# Patient Record
Sex: FEMALE | Race: WHITE | NOT HISPANIC OR LATINO | Employment: FULL TIME | URBAN - METROPOLITAN AREA
[De-identification: names, ages, dates, MRNs, and addresses within clinical notes are randomized per-mention and may not be internally consistent; named-entity substitution may affect disease eponyms.]

---

## 2017-02-10 RX ORDER — CITALOPRAM 20 MG/1
TABLET, FILM COATED ORAL
Qty: 30 TABLET | Refills: 10 | Status: SHIPPED | OUTPATIENT
Start: 2017-02-10 | End: 2018-03-06 | Stop reason: SDUPTHER

## 2018-02-14 ENCOUNTER — TELEPHONE (OUTPATIENT)
Dept: SMOKING CESSATION | Facility: CLINIC | Age: 38
End: 2018-02-14

## 2018-02-15 ENCOUNTER — TELEPHONE (OUTPATIENT)
Dept: SMOKING CESSATION | Facility: CLINIC | Age: 38
End: 2018-02-15

## 2018-02-16 ENCOUNTER — TELEPHONE (OUTPATIENT)
Dept: SMOKING CESSATION | Facility: CLINIC | Age: 38
End: 2018-02-16

## 2018-03-08 RX ORDER — CITALOPRAM 20 MG/1
TABLET, FILM COATED ORAL
Qty: 30 TABLET | Refills: 9 | Status: SHIPPED | OUTPATIENT
Start: 2018-03-08 | End: 2019-01-11 | Stop reason: SDUPTHER

## 2018-06-08 ENCOUNTER — OFFICE VISIT (OUTPATIENT)
Dept: OBSTETRICS AND GYNECOLOGY | Facility: CLINIC | Age: 38
End: 2018-06-08
Payer: COMMERCIAL

## 2018-06-08 ENCOUNTER — PROCEDURE VISIT (OUTPATIENT)
Dept: OBSTETRICS AND GYNECOLOGY | Facility: CLINIC | Age: 38
End: 2018-06-08
Payer: COMMERCIAL

## 2018-06-08 VITALS
HEIGHT: 65 IN | DIASTOLIC BLOOD PRESSURE: 84 MMHG | SYSTOLIC BLOOD PRESSURE: 140 MMHG | BODY MASS INDEX: 33.37 KG/M2 | WEIGHT: 200.31 LBS

## 2018-06-08 DIAGNOSIS — Z01.419 ENCOUNTER FOR GYNECOLOGICAL EXAMINATION: Primary | ICD-10-CM

## 2018-06-08 DIAGNOSIS — N91.2 ABSENT MENSES: ICD-10-CM

## 2018-06-08 DIAGNOSIS — Z11.51 SCREENING FOR HUMAN PAPILLOMAVIRUS: ICD-10-CM

## 2018-06-08 DIAGNOSIS — Z36.89 ENCOUNTER TO ESTABLISH GESTATIONAL AGE USING ULTRASOUND: ICD-10-CM

## 2018-06-08 DIAGNOSIS — Z12.4 ROUTINE CERVICAL SMEAR: ICD-10-CM

## 2018-06-08 DIAGNOSIS — Z34.92 SECOND TRIMESTER PREGNANCY: ICD-10-CM

## 2018-06-08 DIAGNOSIS — Z11.3 SCREENING EXAMINATION FOR VENEREAL DISEASE: ICD-10-CM

## 2018-06-08 PROCEDURE — 99999 PR PBB SHADOW E&M-EST. PATIENT-LVL III: CPT | Mod: PBBFAC,,, | Performed by: OBSTETRICS & GYNECOLOGY

## 2018-06-08 PROCEDURE — 87510 GARDNER VAG DNA DIR PROBE: CPT

## 2018-06-08 PROCEDURE — 87480 CANDIDA DNA DIR PROBE: CPT

## 2018-06-08 PROCEDURE — 99395 PREV VISIT EST AGE 18-39: CPT | Mod: S$GLB,,, | Performed by: OBSTETRICS & GYNECOLOGY

## 2018-06-08 PROCEDURE — 87491 CHLMYD TRACH DNA AMP PROBE: CPT

## 2018-06-08 PROCEDURE — 88175 CYTOPATH C/V AUTO FLUID REDO: CPT

## 2018-06-08 PROCEDURE — 87624 HPV HI-RISK TYP POOLED RSLT: CPT

## 2018-06-08 PROCEDURE — 76805 OB US >/= 14 WKS SNGL FETUS: CPT | Mod: S$GLB,,, | Performed by: OBSTETRICS & GYNECOLOGY

## 2018-06-08 NOTE — PROGRESS NOTES
"CC: Well woman exam    Missy Fernandez is a 37 y.o. female  presents for a well woman exam.  She is established but I haven't seen her since 2016.  Last pap was ASCUS neg HPV In 2016 and never followed up for repeat pap.  H/o irregular cycles.  Unsure LMP.  No c/o.  [place].     Past Medical History:   Diagnosis Date    Anxiety     Depression        Past Surgical History:   Procedure Laterality Date    abdominal laparoscopy  age 15 and 25       OB History    Para Term  AB Living   2 2 2     2   SAB TAB Ectopic Multiple Live Births           2      # Outcome Date GA Lbr Henry/2nd Weight Sex Delivery Anes PTL Lv   2 Term  40w0d  3.629 kg (8 lb) M Vag-Spont   AMILCAR      Birth Comments: pp preEclampsia admit for Magnesium sulfate   1 Term  40w0d  3.09 kg (6 lb 13 oz) F Vag-Spont   AMILCAR          Family History   Problem Relation Age of Onset    No Known Problems Mother     Heart disease Father     Hypertension Father     No Known Problems Sister     Cancer Maternal Grandmother     Kidney disease Maternal Grandfather     Depression Sister     Breast cancer Paternal Grandmother     Colon cancer Neg Hx     Ovarian cancer Neg Hx        Social History   Substance Use Topics    Smoking status: Current Every Day Smoker     Packs/day: 0.50     Types: Cigarettes    Smokeless tobacco: Never Used    Alcohol use Yes      Comment: rare       BP (!) 140/84   Ht 5' 5" (1.651 m)   Wt 90.9 kg (200 lb 4.6 oz)   BMI 33.33 kg/m²     ROS:  GENERAL: Denies weight gain or weight loss. Feeling well overall.   SKIN: Denies rash or lesions.   HEAD: Denies head injury or headache.   NODES: Denies enlarged lymph nodes.   CHEST: Denies chest pain or shortness of breath.   CARDIOVASCULAR: Denies palpitations or left sided chest pain.   ABDOMEN: No abdominal pain, constipation, diarrhea, nausea, vomiting or rectal bleeding.   URINARY: No frequency, dysuria, hematuria, or burning on urination.  REPRODUCTIVE: " See HPI.   BREASTS: The patient performs breast self-examination and denies pain, lumps, or nipple discharge.   HEMATOLOGIC: No easy bruisability or excessive bleeding.  MUSCULOSKELETAL: Denies joint pain or swelling.   NEUROLOGIC: Denies syncope or weakness.   PSYCHIATRIC: Denies depression, anxiety or mood swings.    Physical Exam:    APPEARANCE: Well nourished, well developed, in no acute distress.  AFFECT: WNL, alert and oriented x 3  SKIN: No acne or hirsutism  NECK: Neck symmetric without masses or thyromegaly  NODES: No inguinal, cervical, axillary, or femoral lymph node enlargement  CHEST: Good respiratory effect  ABDOMEN: Soft.  No tenderness or masses.  No hepatosplenomegaly.  No hernias.  BREASTS: Symmetrical, no skin changes or visible lesions.  No palpable masses, nipple discharge bilaterally.  PELVIC: Normal external genitalia without lesions.  Normal hair distribution.  Adequate perineal body, normal urethral meatus.  Vagina moist and well rugated without lesions or discharge.  Cervix pink, without lesions, discharge or tenderness.  No significant cystocele or rectocele.  Bimanual exam shows uterus to be 15 wk size, regular, mobile and nontender.  Adnexa without masses or tenderness.    EXTREMITIES: No edema.    ASSESSMENT AND PLAN  1. Encounter for gynecological examination     2. Absent menses  US OB/GYN Procedure ([facility]) - Extended List - Future   3. Routine cervical smear  Liquid-based pap smear, screening   4. Screening for human papillomavirus  HPV High Risk Genotypes, PCR   5. Screening examination for venereal disease  Vaginosis Screen by DNA Probe    C. trachomatis/N. gonorrhoeae by AMP DNA Cervicovaginal   6. Second trimester pregnancy  US MFM Procedure ([facility])-Future    HIV-1 and HIV-2 antibodies    RPR    Hepatitis B surface antigen    Type & Screen    Rubella antibody, IgG    CBC auto differential    POCT urine pregnancy    Glucose, random    TSH       Patient was counseled today  on A.C.S. Pap guidelines and recommendations for yearly pelvic exams, mammograms and monthly self breast exams; to see her PCP for other health maintenance.     +UPT and felt 15 weeks on exam.  U/s today confirms 16 wk IUP and EDC from today's scan.  Ob counseling.  Stop smoking ok to stay on celexa as she feel she cannot wean.  Wants materna 21.      Follow-up in about 4 weeks (around 7/6/2018).

## 2018-06-08 NOTE — PROCEDURES
Procedures     Obstetrical ultrasound completed today.  See report in imaging section of [facility].

## 2018-06-09 LAB
CANDIDA RRNA VAG QL PROBE: NEGATIVE
G VAGINALIS RRNA GENITAL QL PROBE: NEGATIVE
T VAGINALIS RRNA GENITAL QL PROBE: NEGATIVE

## 2018-06-10 LAB
C TRACH DNA SPEC QL NAA+PROBE: NOT DETECTED
N GONORRHOEA DNA SPEC QL NAA+PROBE: NOT DETECTED

## 2018-06-13 LAB
HPV HR 12 DNA CVX QL NAA+PROBE: NEGATIVE
HPV16 AG SPEC QL: NEGATIVE
HPV18 DNA SPEC QL NAA+PROBE: NEGATIVE

## 2018-06-15 ENCOUNTER — LAB VISIT (OUTPATIENT)
Dept: LAB | Facility: HOSPITAL | Age: 38
End: 2018-06-15
Attending: OBSTETRICS & GYNECOLOGY
Payer: COMMERCIAL

## 2018-06-15 DIAGNOSIS — Z34.92 SECOND TRIMESTER PREGNANCY: ICD-10-CM

## 2018-06-15 LAB
ABO + RH BLD: NORMAL
BASOPHILS # BLD AUTO: 0.01 K/UL
BASOPHILS NFR BLD: 0.1 %
BLD GP AB SCN CELLS X3 SERPL QL: NORMAL
DIFFERENTIAL METHOD: ABNORMAL
EOSINOPHIL # BLD AUTO: 0.1 K/UL
EOSINOPHIL NFR BLD: 0.9 %
ERYTHROCYTE [DISTWIDTH] IN BLOOD BY AUTOMATED COUNT: 13.5 %
GLUCOSE SERPL-MCNC: 91 MG/DL
HCT VFR BLD AUTO: 36.7 %
HGB BLD-MCNC: 12.4 G/DL
IMM GRANULOCYTES # BLD AUTO: 0.08 K/UL
IMM GRANULOCYTES NFR BLD AUTO: 0.9 %
LYMPHOCYTES # BLD AUTO: 2.2 K/UL
LYMPHOCYTES NFR BLD: 25.3 %
MCH RBC QN AUTO: 31.2 PG
MCHC RBC AUTO-ENTMCNC: 33.8 G/DL
MCV RBC AUTO: 92 FL
MONOCYTES # BLD AUTO: 0.7 K/UL
MONOCYTES NFR BLD: 7.7 %
NEUTROPHILS # BLD AUTO: 5.6 K/UL
NEUTROPHILS NFR BLD: 65.1 %
NRBC BLD-RTO: 0 /100 WBC
PLATELET # BLD AUTO: 153 K/UL
PMV BLD AUTO: 13.7 FL
RBC # BLD AUTO: 3.98 M/UL
RH BLD: NORMAL
TSH SERPL DL<=0.005 MIU/L-ACNC: 1.2 UIU/ML
WBC # BLD AUTO: 8.58 K/UL

## 2018-06-15 PROCEDURE — 86592 SYPHILIS TEST NON-TREP QUAL: CPT

## 2018-06-15 PROCEDURE — 86703 HIV-1/HIV-2 1 RESULT ANTBDY: CPT

## 2018-06-15 PROCEDURE — 84443 ASSAY THYROID STIM HORMONE: CPT

## 2018-06-15 PROCEDURE — 86762 RUBELLA ANTIBODY: CPT

## 2018-06-15 PROCEDURE — 86901 BLOOD TYPING SEROLOGIC RH(D): CPT

## 2018-06-15 PROCEDURE — 85025 COMPLETE CBC W/AUTO DIFF WBC: CPT

## 2018-06-15 PROCEDURE — 86850 RBC ANTIBODY SCREEN: CPT

## 2018-06-15 PROCEDURE — 87340 HEPATITIS B SURFACE AG IA: CPT

## 2018-06-15 PROCEDURE — 82947 ASSAY GLUCOSE BLOOD QUANT: CPT

## 2018-06-18 LAB
HBV SURFACE AG SERPL QL IA: NEGATIVE
HIV 1+2 AB+HIV1 P24 AG SERPL QL IA: NEGATIVE
RPR SER QL: NORMAL
RUBV IGG SER-ACNC: 34.9 IU/ML
RUBV IGG SER-IMP: REACTIVE

## 2018-06-21 PROBLEM — O26.899 RH NEGATIVE, ANTEPARTUM: Status: ACTIVE | Noted: 2018-06-21

## 2018-06-21 PROBLEM — Z67.91 RH NEGATIVE, ANTEPARTUM: Status: ACTIVE | Noted: 2018-06-21

## 2018-07-06 ENCOUNTER — INITIAL PRENATAL (OUTPATIENT)
Dept: OBSTETRICS AND GYNECOLOGY | Facility: CLINIC | Age: 38
End: 2018-07-06
Payer: COMMERCIAL

## 2018-07-06 ENCOUNTER — OFFICE VISIT (OUTPATIENT)
Dept: MATERNAL FETAL MEDICINE | Facility: CLINIC | Age: 38
End: 2018-07-06
Attending: OBSTETRICS & GYNECOLOGY
Payer: COMMERCIAL

## 2018-07-06 VITALS
WEIGHT: 200.19 LBS | BODY MASS INDEX: 33.31 KG/M2 | DIASTOLIC BLOOD PRESSURE: 70 MMHG | SYSTOLIC BLOOD PRESSURE: 124 MMHG

## 2018-07-06 DIAGNOSIS — Z34.80 SUPERVISION OF OTHER NORMAL PREGNANCY, ANTEPARTUM: Primary | ICD-10-CM

## 2018-07-06 DIAGNOSIS — Z36.89 ENCOUNTER FOR FETAL ANATOMIC SURVEY: ICD-10-CM

## 2018-07-06 DIAGNOSIS — Z34.92 SECOND TRIMESTER PREGNANCY: ICD-10-CM

## 2018-07-06 DIAGNOSIS — Z30.2 REQUEST FOR STERILIZATION: ICD-10-CM

## 2018-07-06 DIAGNOSIS — N80.9 ENDOMETRIOSIS: ICD-10-CM

## 2018-07-06 PROCEDURE — 0502F SUBSEQUENT PRENATAL CARE: CPT | Mod: S$GLB,,, | Performed by: OBSTETRICS & GYNECOLOGY

## 2018-07-06 PROCEDURE — 76811 OB US DETAILED SNGL FETUS: CPT | Mod: S$GLB,,, | Performed by: OBSTETRICS & GYNECOLOGY

## 2018-07-06 PROCEDURE — 99999 PR PBB SHADOW E&M-EST. PATIENT-LVL II: CPT | Mod: PBBFAC,,, | Performed by: OBSTETRICS & GYNECOLOGY

## 2018-07-06 PROCEDURE — 99499 UNLISTED E&M SERVICE: CPT | Mod: S$GLB,,, | Performed by: OBSTETRICS & GYNECOLOGY

## 2018-07-06 RX ORDER — ACETAMINOPHEN 325 MG/1
650 TABLET ORAL
Status: ON HOLD | COMMUNITY
End: 2018-11-02 | Stop reason: HOSPADM

## 2018-07-06 RX ORDER — NAPROXEN SODIUM 220 MG/1
81 TABLET, FILM COATED ORAL DAILY
Status: ON HOLD | COMMUNITY
End: 2018-11-02 | Stop reason: HOSPADM

## 2018-07-06 NOTE — PROGRESS NOTES
Still smoking 5 cig daily. Discussed risks of continued smoking and pregnancy. Had anatomy today.  Wants BTL, discussed due to high volume at [facility] will most likely cannot do it.  Has h/o endo and recommend hormonal mgmt.  Was on depoprovera.  Does not want IUD, recommmend pp depo.  If has section wants BTL

## 2018-07-06 NOTE — LETTER
July 6, 2018      Keith Ruby MD  [address]  [address]  [facility] [address]           [facility]  [address]  [facility] [address]  Phone: [phone]          Patient: Missy Fernandez   MR Number: [id number]   YOB: 1980   Date of Visit: 7/6/2018       Dear Dr. Keith Ruby:    Thank you for referring Missy Fernandez to me for evaluation. Attached you will find relevant portions of my assessment and plan of care.    If you have questions, please do not hesitate to call me. I look forward to following Missy Fernandez along with you.    Sincerely,    Rome Rodriguez MD    Enclosure  CC:  No Recipients    If you would like to receive this communication electronically, please contact [email or web address] or [phone] to request more information on [employer] access.    For providers and/or their staff who would like to refer a patient to [facility], please contact us through our one-stop-shop provider referral line, [facility], at [phone].    If you feel you have received this communication in error or would no longer like to receive these types of communications, please e-mail [email or web address]

## 2018-08-03 ENCOUNTER — ROUTINE PRENATAL (OUTPATIENT)
Dept: OBSTETRICS AND GYNECOLOGY | Facility: CLINIC | Age: 38
End: 2018-08-03
Attending: STUDENT IN AN ORGANIZED HEALTH CARE EDUCATION/TRAINING PROGRAM
Payer: COMMERCIAL

## 2018-08-03 VITALS
BODY MASS INDEX: 33.75 KG/M2 | SYSTOLIC BLOOD PRESSURE: 124 MMHG | DIASTOLIC BLOOD PRESSURE: 80 MMHG | WEIGHT: 202.81 LBS

## 2018-08-03 DIAGNOSIS — N89.8 VAGINAL DISCHARGE DURING PREGNANCY IN SECOND TRIMESTER: Primary | ICD-10-CM

## 2018-08-03 DIAGNOSIS — O26.892 VAGINAL DISCHARGE DURING PREGNANCY IN SECOND TRIMESTER: Primary | ICD-10-CM

## 2018-08-03 DIAGNOSIS — Z3A.24 24 WEEKS GESTATION OF PREGNANCY: ICD-10-CM

## 2018-08-03 PROCEDURE — 99999 PR PBB SHADOW E&M-EST. PATIENT-LVL III: CPT | Mod: PBBFAC,,, | Performed by: STUDENT IN AN ORGANIZED HEALTH CARE EDUCATION/TRAINING PROGRAM

## 2018-08-03 PROCEDURE — 87510 GARDNER VAG DNA DIR PROBE: CPT

## 2018-08-03 PROCEDURE — 0502F SUBSEQUENT PRENATAL CARE: CPT | Mod: S$GLB,,, | Performed by: STUDENT IN AN ORGANIZED HEALTH CARE EDUCATION/TRAINING PROGRAM

## 2018-08-03 PROCEDURE — 87480 CANDIDA DNA DIR PROBE: CPT

## 2018-08-03 NOTE — PROGRESS NOTES
Doing well.  Reports some left sided cramping that is intermittent.  Reports fetal movement.  Denies contractions, leakage of fluid, vaginal bleeding.  Vaginal swab and urine culture collected.  SVE C/T/H.  Needs glucose and CBC.  All questions answered.  RTC in 4 weeks or sooner if needed.

## 2018-08-08 ENCOUNTER — OFFICE VISIT (OUTPATIENT)
Dept: MATERNAL FETAL MEDICINE | Facility: CLINIC | Age: 38
End: 2018-08-08
Attending: OBSTETRICS & GYNECOLOGY
Payer: COMMERCIAL

## 2018-08-08 PROCEDURE — 76816 OB US FOLLOW-UP PER FETUS: CPT | Mod: S$GLB,,, | Performed by: OBSTETRICS & GYNECOLOGY

## 2018-08-08 PROCEDURE — 99499 UNLISTED E&M SERVICE: CPT | Mod: S$GLB,,, | Performed by: OBSTETRICS & GYNECOLOGY

## 2018-08-08 NOTE — LETTER
August 8, 2018      Keith Ruby MD  [address]  [address]  [facility] [address]           [facility]  [address]  [facility] [address]  Phone: [phone]          Patient: Missy Fernandez   MR Number: [id number]   YOB: 1980   Date of Visit: 8/8/2018       Dear Dr. Keith Ruby:    Thank you for referring Missy Fernandez to me for evaluation. Attached you will find relevant portions of my assessment and plan of care.    If you have questions, please do not hesitate to call me. I look forward to following Missy Fernandez along with you.    Sincerely,    Gela Banks MD    Enclosure  CC:  No Recipients    If you would like to receive this communication electronically, please contact [email or web address] or [phone] to request more information on [employer] Link access.    For providers and/or their staff who would like to refer a patient to [facility], please contact us through our one-stop-shop provider referral line, [facility], at [phone].    If you feel you have received this communication in error or would no longer like to receive these types of communications, please e-mail [email or web address]

## 2018-08-31 ENCOUNTER — ROUTINE PRENATAL (OUTPATIENT)
Dept: OBSTETRICS AND GYNECOLOGY | Facility: CLINIC | Age: 38
End: 2018-08-31
Payer: COMMERCIAL

## 2018-08-31 ENCOUNTER — LAB VISIT (OUTPATIENT)
Dept: LAB | Facility: HOSPITAL | Age: 38
End: 2018-08-31
Attending: OBSTETRICS & GYNECOLOGY
Payer: COMMERCIAL

## 2018-08-31 ENCOUNTER — CLINICAL SUPPORT (OUTPATIENT)
Dept: OBSTETRICS AND GYNECOLOGY | Facility: CLINIC | Age: 38
End: 2018-08-31
Payer: COMMERCIAL

## 2018-08-31 VITALS
WEIGHT: 204.81 LBS | BODY MASS INDEX: 34.08 KG/M2 | SYSTOLIC BLOOD PRESSURE: 128 MMHG | DIASTOLIC BLOOD PRESSURE: 84 MMHG

## 2018-08-31 DIAGNOSIS — Z34.80 SUPERVISION OF OTHER NORMAL PREGNANCY, ANTEPARTUM: Primary | ICD-10-CM

## 2018-08-31 DIAGNOSIS — Z3A.24 24 WEEKS GESTATION OF PREGNANCY: ICD-10-CM

## 2018-08-31 DIAGNOSIS — Z29.13 NEED FOR RHOGAM DUE TO RH NEGATIVE MOTHER: Primary | ICD-10-CM

## 2018-08-31 DIAGNOSIS — Z67.91 RH NEGATIVE STATE IN ANTEPARTUM PERIOD: ICD-10-CM

## 2018-08-31 DIAGNOSIS — O26.899 RH NEGATIVE STATE IN ANTEPARTUM PERIOD: ICD-10-CM

## 2018-08-31 LAB
ABO GROUP BLD: NORMAL
BASOPHILS # BLD AUTO: 0.03 K/UL
BASOPHILS NFR BLD: 0.3 %
BLD GP AB SCN CELLS X3 SERPL QL: NORMAL
DIFFERENTIAL METHOD: ABNORMAL
EOSINOPHIL # BLD AUTO: 0.1 K/UL
EOSINOPHIL NFR BLD: 0.6 %
ERYTHROCYTE [DISTWIDTH] IN BLOOD BY AUTOMATED COUNT: 13.5 %
GLUCOSE SERPL-MCNC: 134 MG/DL
HCT VFR BLD AUTO: 35.4 %
HGB BLD-MCNC: 11.6 G/DL
IMM GRANULOCYTES # BLD AUTO: 0.09 K/UL
IMM GRANULOCYTES NFR BLD AUTO: 0.9 %
LYMPHOCYTES # BLD AUTO: 2.1 K/UL
LYMPHOCYTES NFR BLD: 22.2 %
MCH RBC QN AUTO: 31.4 PG
MCHC RBC AUTO-ENTMCNC: 32.8 G/DL
MCV RBC AUTO: 96 FL
MONOCYTES # BLD AUTO: 0.6 K/UL
MONOCYTES NFR BLD: 6.2 %
NEUTROPHILS # BLD AUTO: 6.7 K/UL
NEUTROPHILS NFR BLD: 69.8 %
NRBC BLD-RTO: 0 /100 WBC
PLATELET # BLD AUTO: 187 K/UL
PMV BLD AUTO: 12.2 FL
RBC # BLD AUTO: 3.69 M/UL
RH BLD: NORMAL
WBC # BLD AUTO: 9.64 K/UL

## 2018-08-31 PROCEDURE — 85025 COMPLETE CBC W/AUTO DIFF WBC: CPT

## 2018-08-31 PROCEDURE — 0502F SUBSEQUENT PRENATAL CARE: CPT | Mod: S$GLB,,, | Performed by: OBSTETRICS & GYNECOLOGY

## 2018-08-31 PROCEDURE — 99999 PR PBB SHADOW E&M-EST. PATIENT-LVL II: CPT | Mod: PBBFAC,,, | Performed by: OBSTETRICS & GYNECOLOGY

## 2018-08-31 PROCEDURE — 86850 RBC ANTIBODY SCREEN: CPT

## 2018-08-31 PROCEDURE — 82950 GLUCOSE TEST: CPT

## 2018-08-31 PROCEDURE — 86900 BLOOD TYPING SEROLOGIC ABO: CPT

## 2018-08-31 PROCEDURE — 86901 BLOOD TYPING SEROLOGIC RH(D): CPT

## 2018-08-31 PROCEDURE — 99999 PR PBB SHADOW E&M-EST. PATIENT-LVL I: CPT | Mod: PBBFAC,,,

## 2018-08-31 NOTE — PROGRESS NOTES
Need for Rhogam reviewed and explained to pt. Pt verbalized understanding. Need for Rhogam due to Rh negative blood type. Blood type on record reviewed x2. Pt received scheduled Rhogam as ordered by Dr Ruby to Right ventrogluteal, pt toelrated well. Pt denies pain. Pt instructed and observed for 15 min post injection. No reaction noted.

## 2018-08-31 NOTE — PROGRESS NOTES
Dm screen today and rhogam injection after.  Still smoking, decreased to half PPD or sometimes 5-6 per day.  Recommend growth scan at 32 weeks for AMA and tobacco abuse.  Risks to smoking, placenta abruption, hemorhage, maternal and fetal death.  Wants sterilization if needs section

## 2018-09-25 ENCOUNTER — ROUTINE PRENATAL (OUTPATIENT)
Dept: OBSTETRICS AND GYNECOLOGY | Facility: CLINIC | Age: 38
End: 2018-09-25
Payer: COMMERCIAL

## 2018-09-25 ENCOUNTER — CLINICAL SUPPORT (OUTPATIENT)
Dept: OBSTETRICS AND GYNECOLOGY | Facility: CLINIC | Age: 38
End: 2018-09-25
Payer: COMMERCIAL

## 2018-09-25 VITALS — DIASTOLIC BLOOD PRESSURE: 80 MMHG | SYSTOLIC BLOOD PRESSURE: 126 MMHG | BODY MASS INDEX: 34.8 KG/M2 | WEIGHT: 209.13 LBS

## 2018-09-25 DIAGNOSIS — Z23 INFLUENZA VACCINE ADMINISTERED: ICD-10-CM

## 2018-09-25 DIAGNOSIS — Z34.80 SUPERVISION OF OTHER NORMAL PREGNANCY, ANTEPARTUM: Primary | ICD-10-CM

## 2018-09-25 DIAGNOSIS — Z23 NEED FOR TDAP VACCINATION: ICD-10-CM

## 2018-09-25 DIAGNOSIS — O09.523 ELDERLY MULTIGRAVIDA IN THIRD TRIMESTER: ICD-10-CM

## 2018-09-25 DIAGNOSIS — Z23 NEED FOR TDAP VACCINATION: Primary | ICD-10-CM

## 2018-09-25 PROCEDURE — 90715 TDAP VACCINE 7 YRS/> IM: CPT | Mod: S$GLB,,, | Performed by: OBSTETRICS & GYNECOLOGY

## 2018-09-25 PROCEDURE — 90472 IMMUNIZATION ADMIN EACH ADD: CPT | Mod: S$GLB,,, | Performed by: OBSTETRICS & GYNECOLOGY

## 2018-09-25 PROCEDURE — 0502F SUBSEQUENT PRENATAL CARE: CPT | Mod: S$GLB,,, | Performed by: OBSTETRICS & GYNECOLOGY

## 2018-09-25 PROCEDURE — 90471 IMMUNIZATION ADMIN: CPT | Mod: S$GLB,,, | Performed by: OBSTETRICS & GYNECOLOGY

## 2018-09-25 PROCEDURE — 99999 PR PBB SHADOW E&M-EST. PATIENT-LVL II: CPT | Mod: PBBFAC,,, | Performed by: OBSTETRICS & GYNECOLOGY

## 2018-09-25 PROCEDURE — 90686 IIV4 VACC NO PRSV 0.5 ML IM: CPT | Mod: S$GLB,,, | Performed by: OBSTETRICS & GYNECOLOGY

## 2018-09-25 NOTE — PROGRESS NOTES
Ordering Provider: Dr. Ruby    During visit today patient received an injection of Tdap to left deltoid.  Tolerated well.  Instructed pt to remain 15 minutes after injection to monitor for reactions.      Pre pain scale: none    Post pain scale: none

## 2018-09-25 NOTE — PROGRESS NOTES
Flu and tdap done.  Needs growth due to AMA.  5 cigarettes a day goal is to quit this week.  Good FM.

## 2018-10-12 ENCOUNTER — LAB VISIT (OUTPATIENT)
Dept: LAB | Facility: HOSPITAL | Age: 38
End: 2018-10-12
Attending: OBSTETRICS & GYNECOLOGY
Payer: COMMERCIAL

## 2018-10-12 ENCOUNTER — PROCEDURE VISIT (OUTPATIENT)
Dept: OBSTETRICS AND GYNECOLOGY | Facility: CLINIC | Age: 38
End: 2018-10-12
Payer: COMMERCIAL

## 2018-10-12 ENCOUNTER — ROUTINE PRENATAL (OUTPATIENT)
Dept: OBSTETRICS AND GYNECOLOGY | Facility: CLINIC | Age: 38
End: 2018-10-12
Payer: COMMERCIAL

## 2018-10-12 VITALS — DIASTOLIC BLOOD PRESSURE: 84 MMHG | BODY MASS INDEX: 35.37 KG/M2 | WEIGHT: 212.5 LBS | SYSTOLIC BLOOD PRESSURE: 144 MMHG

## 2018-10-12 DIAGNOSIS — Z34.80 SUPERVISION OF OTHER NORMAL PREGNANCY, ANTEPARTUM: Primary | ICD-10-CM

## 2018-10-12 DIAGNOSIS — O16.3 ELEVATED BLOOD PRESSURE AFFECTING PREGNANCY IN THIRD TRIMESTER, ANTEPARTUM: ICD-10-CM

## 2018-10-12 DIAGNOSIS — O09.523 ELDERLY MULTIGRAVIDA IN THIRD TRIMESTER: ICD-10-CM

## 2018-10-12 LAB
ALBUMIN SERPL BCP-MCNC: 2.7 G/DL
ALP SERPL-CCNC: 118 U/L
ALT SERPL W/O P-5'-P-CCNC: <5 U/L
ANION GAP SERPL CALC-SCNC: 6 MMOL/L
AST SERPL-CCNC: 9 U/L
BASOPHILS # BLD AUTO: 0.02 K/UL
BASOPHILS NFR BLD: 0.2 %
BILIRUB SERPL-MCNC: 0.2 MG/DL
BUN SERPL-MCNC: 4 MG/DL
CALCIUM SERPL-MCNC: 8.4 MG/DL
CHLORIDE SERPL-SCNC: 107 MMOL/L
CO2 SERPL-SCNC: 22 MMOL/L
CREAT SERPL-MCNC: 0.6 MG/DL
CREAT UR-MCNC: 110 MG/DL
DIFFERENTIAL METHOD: ABNORMAL
EOSINOPHIL # BLD AUTO: 0 K/UL
EOSINOPHIL NFR BLD: 0.4 %
ERYTHROCYTE [DISTWIDTH] IN BLOOD BY AUTOMATED COUNT: 13.6 %
EST. GFR  (AFRICAN AMERICAN): >60 ML/MIN/1.73 M^2
EST. GFR  (NON AFRICAN AMERICAN): >60 ML/MIN/1.73 M^2
GLUCOSE SERPL-MCNC: 94 MG/DL
HCT VFR BLD AUTO: 31.1 %
HGB BLD-MCNC: 10 G/DL
IMM GRANULOCYTES # BLD AUTO: 0.05 K/UL
IMM GRANULOCYTES NFR BLD AUTO: 0.5 %
LYMPHOCYTES # BLD AUTO: 2.5 K/UL
LYMPHOCYTES NFR BLD: 26.5 %
MCH RBC QN AUTO: 30.3 PG
MCHC RBC AUTO-ENTMCNC: 32.2 G/DL
MCV RBC AUTO: 94 FL
MONOCYTES # BLD AUTO: 0.8 K/UL
MONOCYTES NFR BLD: 9.1 %
NEUTROPHILS # BLD AUTO: 5.9 K/UL
NEUTROPHILS NFR BLD: 63.3 %
NRBC BLD-RTO: 0 /100 WBC
PLATELET # BLD AUTO: 200 K/UL
PMV BLD AUTO: 12.1 FL
POTASSIUM SERPL-SCNC: 3.7 MMOL/L
PROT SERPL-MCNC: 6 G/DL
PROT UR-MCNC: 24 MG/DL
PROT/CREAT UR: 0.22 MG/G{CREAT}
RBC # BLD AUTO: 3.3 M/UL
SODIUM SERPL-SCNC: 135 MMOL/L
WBC # BLD AUTO: 9.25 K/UL

## 2018-10-12 PROCEDURE — 0502F SUBSEQUENT PRENATAL CARE: CPT | Mod: S$GLB,,, | Performed by: OBSTETRICS & GYNECOLOGY

## 2018-10-12 PROCEDURE — 76816 OB US FOLLOW-UP PER FETUS: CPT | Mod: S$GLB,,, | Performed by: PEDIATRICS

## 2018-10-12 PROCEDURE — 82570 ASSAY OF URINE CREATININE: CPT

## 2018-10-12 PROCEDURE — 99999 PR PBB SHADOW E&M-EST. PATIENT-LVL II: CPT | Mod: PBBFAC,,, | Performed by: OBSTETRICS & GYNECOLOGY

## 2018-10-12 PROCEDURE — 80053 COMPREHEN METABOLIC PANEL: CPT

## 2018-10-12 PROCEDURE — 85025 COMPLETE CBC W/AUTO DIFF WBC: CPT

## 2018-10-12 NOTE — PROCEDURES
Indication  ========    Evaluation of fetal growth.    History  ======    Risk Factors  History risk factors: AMA    Method  ======    Transabdominal ultrasound examination, 2D Color Doppler, Susan iU22. View: Good view.    Pregnancy  =========    Moreno pregnancy. Number of fetuses: 1.    Dating  ======    Cycle: regular cycle  Ultrasound examination on: 10/12/2018  GA by U/S based upon: AC, BPD, Femur, HC  GA by U/S 35 w + 4 d  ALEXSANDRA by U/S: 11/12/2018  Assigned: Dating performed on 06/8/2018, based on ultrasound (AC, BPD, Femur, HC)  Assigned GA 34 w + 0 d  Assigned ALEXSANDRA: 11/23/2018    General Evaluation  ==============    Cardiac activity: present.  bpm.  Fetal movements: visualized.  Presentation: cephalic.  Placenta: Fundal.  Amniotic fluid: Amount of AF: normal amount. MVP 5.9 cm.    Fetal Biometry  ============    Fetal Biometry  BPD 87.5 mm 35w 2d Hadlock  .0 mm 38w 0d Hadlock  .0 mm 35w 2d Hadlock  Femur 65.1 mm 33w 4d Hadlock  EFW 2,589 g 44% Hao  Calculated by: Hadlock (BPD-HC-AC-FL)  EFW (lb) 5 lb  EFW (oz) 11 oz  HC / AC 1.06  FL / BPD 0.74  FL / AC 0.21  MVP 5.9 cm   bpm    Fetal Anatomy  ============    4-chamber view: normal  Stomach: normal  Kidneys: normal  Bladder: normal  Gender: male  Wants to know gender: yes  Other: A full anatomic survey has been previously performed.            Impression  =========    Fetal size is AGA with the EFW at the 44th percentile.    Normal repeat limited fetal anatomic survey.  AFV is normal.    Follow-up ultrasound as clinically indicated.          Recommendation  ==============    Repeat ultrasound study as clinically indicated.

## 2018-10-12 NOTE — PROGRESS NOTES
Feel and hit left abdomen in shower two nights ago at 10pm, never came in.  Feels bruised. No LOF no VB.  Still smoking.  Has headache took tylenol and improved.  No vision changes no RUQ pain.  Repeat bp 133/76. Has h/o pp preEclampsia.  EFW 44% and bpp today 8/8. Pre Eclampsia precautions

## 2018-10-18 ENCOUNTER — ROUTINE PRENATAL (OUTPATIENT)
Dept: OBSTETRICS AND GYNECOLOGY | Facility: CLINIC | Age: 38
End: 2018-10-18
Payer: COMMERCIAL

## 2018-10-18 VITALS
SYSTOLIC BLOOD PRESSURE: 130 MMHG | WEIGHT: 210.19 LBS | BODY MASS INDEX: 34.98 KG/M2 | DIASTOLIC BLOOD PRESSURE: 80 MMHG

## 2018-10-18 DIAGNOSIS — N89.8 VAGINAL LESION: ICD-10-CM

## 2018-10-18 DIAGNOSIS — Z3A.35 35 WEEKS GESTATION OF PREGNANCY: ICD-10-CM

## 2018-10-18 DIAGNOSIS — N89.8 VAGINAL DISCHARGE: Primary | ICD-10-CM

## 2018-10-18 DIAGNOSIS — N76.0 VAGINITIS AND VULVOVAGINITIS: ICD-10-CM

## 2018-10-18 LAB
CANDIDA RRNA VAG QL PROBE: NEGATIVE
G VAGINALIS RRNA GENITAL QL PROBE: POSITIVE
T VAGINALIS RRNA GENITAL QL PROBE: NEGATIVE

## 2018-10-18 PROCEDURE — 87529 HSV DNA AMP PROBE: CPT

## 2018-10-18 PROCEDURE — 87660 TRICHOMONAS VAGIN DIR PROBE: CPT

## 2018-10-18 PROCEDURE — 99999 PR PBB SHADOW E&M-EST. PATIENT-LVL III: CPT | Mod: PBBFAC,,, | Performed by: NURSE PRACTITIONER

## 2018-10-18 PROCEDURE — 0502F SUBSEQUENT PRENATAL CARE: CPT | Mod: S$GLB,,, | Performed by: NURSE PRACTITIONER

## 2018-10-18 RX ORDER — NYSTATIN AND TRIAMCINOLONE ACETONIDE 100000; 1 [USP'U]/G; MG/G
OINTMENT TOPICAL 2 TIMES DAILY
Qty: 30 G | Refills: 0 | Status: SHIPPED | OUTPATIENT
Start: 2018-10-18 | End: 2018-10-30

## 2018-10-21 ENCOUNTER — PATIENT MESSAGE (OUTPATIENT)
Dept: OBSTETRICS AND GYNECOLOGY | Facility: CLINIC | Age: 38
End: 2018-10-21

## 2018-10-21 LAB
HSV1 DNA SPEC QL NAA+PROBE: NEGATIVE
HSV2 DNA SPEC QL NAA+PROBE: NEGATIVE
SPECIMEN SOURCE: NORMAL

## 2018-10-21 RX ORDER — METRONIDAZOLE 7.5 MG/G
GEL VAGINAL
Qty: 70 G | Refills: 0 | Status: SHIPPED | OUTPATIENT
Start: 2018-10-21 | End: 2018-10-30

## 2018-10-21 NOTE — TELEPHONE ENCOUNTER
Please remind patient to go into her email and activate her [facility] account.  In case she can't find it, here it is:  [email or web address]  She can log on to the [email or web address] website and enter that.    Please make sure to tell her the HSV swab was negative, but the Affirm was (+) for BV, and that I sent in a prescription for Metrogel to her pharmacy on file.

## 2018-10-21 NOTE — PROGRESS NOTES
Please call pt and make sure she got my email that her HSV culture was NEGATIVE!!  However, the vaginal swab came back positive for Bacterial Vaginosis.    I've sent in a prescription for Metrogel to use for 5 nights at bedtime.

## 2018-10-22 ENCOUNTER — TELEPHONE (OUTPATIENT)
Dept: OBSTETRICS AND GYNECOLOGY | Facility: CLINIC | Age: 38
End: 2018-10-22

## 2018-10-22 NOTE — TELEPHONE ENCOUNTER
I called pt and gave her results per Amna hsv is neg but affirm is positive for bv  meds were called in to the pharmacy

## 2018-10-22 NOTE — TELEPHONE ENCOUNTER
----- Message from Amna Virgen NP sent at 10/21/2018  5:36 PM CDT -----  Please call pt and make sure she got my email that her HSV culture was NEGATIVE!!  However, the vaginal swab came back positive for Bacterial Vaginosis.    I've sent in a prescription for Metrogel to use for 5 nights at bedtime.

## 2018-10-22 NOTE — PROGRESS NOTES
Here today with c/o vulvovaginal irritation x 1 week.  Has had an occasional headache, but denies any in past several days.  Denies dizziness, blurred vision, or R upper epigastric pain.  Reports good FM; denies regular ctx's.  Exam:   Upon exam of external genitalia, there is an area on pt's upper left labia majora, that appears to be c/w small cluster of 3-4 ulcers (excoriation vs HSV lesion?).  Surrounding tissue to labia is erythematous and mildly excoriated.  Denies h/o genital or oral HSV.  Collected HSV culture to rule out HSV lesion, although I do think ulceration is more likely related to moderate excoriation.  Vagina mildly erythematous with moderate amount white homogenous d/c noted - affirm collected.  Cervix visualized:  closed, soft, high.    Labor/bleeding/decreased FM prec given to pt with good understanding.  Will call with Affirm results when finalized.

## 2018-10-26 ENCOUNTER — ROUTINE PRENATAL (OUTPATIENT)
Dept: OBSTETRICS AND GYNECOLOGY | Facility: CLINIC | Age: 38
End: 2018-10-26
Payer: COMMERCIAL

## 2018-10-26 ENCOUNTER — HOSPITAL ENCOUNTER (EMERGENCY)
Facility: OTHER | Age: 38
Discharge: HOME | End: 2018-10-26
Attending: OBSTETRICS & GYNECOLOGY
Payer: COMMERCIAL

## 2018-10-26 VITALS
DIASTOLIC BLOOD PRESSURE: 96 MMHG | BODY MASS INDEX: 34.49 KG/M2 | SYSTOLIC BLOOD PRESSURE: 160 MMHG | WEIGHT: 207.25 LBS

## 2018-10-26 VITALS
SYSTOLIC BLOOD PRESSURE: 139 MMHG | RESPIRATION RATE: 18 BRPM | OXYGEN SATURATION: 98 % | DIASTOLIC BLOOD PRESSURE: 79 MMHG | TEMPERATURE: 98 F | HEART RATE: 97 BPM

## 2018-10-26 DIAGNOSIS — Z3A.36 36 WEEKS GESTATION OF PREGNANCY: Primary | ICD-10-CM

## 2018-10-26 DIAGNOSIS — O13.3 GESTATIONAL HYPERTENSION W/O SIGNIFICANT PROTEINURIA IN 3RD TRIMESTER: Primary | ICD-10-CM

## 2018-10-26 DIAGNOSIS — Z3A.36 36 WEEKS GESTATION OF PREGNANCY: ICD-10-CM

## 2018-10-26 LAB
ALBUMIN SERPL BCP-MCNC: 2.9 G/DL
ALP SERPL-CCNC: 142 U/L
ALT SERPL W/O P-5'-P-CCNC: <5 U/L
ANION GAP SERPL CALC-SCNC: 13 MMOL/L
AST SERPL-CCNC: 12 U/L
BASOPHILS # BLD AUTO: 0.01 K/UL
BASOPHILS NFR BLD: 0.1 %
BILIRUB SERPL-MCNC: 0.3 MG/DL
BUN SERPL-MCNC: 3 MG/DL
CALCIUM SERPL-MCNC: 8.8 MG/DL
CHLORIDE SERPL-SCNC: 107 MMOL/L
CO2 SERPL-SCNC: 17 MMOL/L
CREAT SERPL-MCNC: 0.6 MG/DL
CREAT UR-MCNC: 146.3 MG/DL
DIFFERENTIAL METHOD: ABNORMAL
EOSINOPHIL # BLD AUTO: 0 K/UL
EOSINOPHIL NFR BLD: 0.4 %
ERYTHROCYTE [DISTWIDTH] IN BLOOD BY AUTOMATED COUNT: 13.6 %
EST. GFR  (AFRICAN AMERICAN): >60 ML/MIN/1.73 M^2
EST. GFR  (NON AFRICAN AMERICAN): >60 ML/MIN/1.73 M^2
GLUCOSE SERPL-MCNC: 89 MG/DL
HCT VFR BLD AUTO: 32.7 %
HGB BLD-MCNC: 10.7 G/DL
LYMPHOCYTES # BLD AUTO: 2.6 K/UL
LYMPHOCYTES NFR BLD: 28.7 %
MCH RBC QN AUTO: 30 PG
MCHC RBC AUTO-ENTMCNC: 32.7 G/DL
MCV RBC AUTO: 92 FL
MONOCYTES # BLD AUTO: 0.8 K/UL
MONOCYTES NFR BLD: 8.5 %
NEUTROPHILS # BLD AUTO: 5.7 K/UL
NEUTROPHILS NFR BLD: 61.8 %
PLATELET # BLD AUTO: 244 K/UL
PMV BLD AUTO: 11.8 FL
POTASSIUM SERPL-SCNC: 3.7 MMOL/L
PROT SERPL-MCNC: 6.7 G/DL
PROT UR-MCNC: 39 MG/DL
PROT/CREAT UR: 0.27 MG/G{CREAT}
RBC # BLD AUTO: 3.57 M/UL
SODIUM SERPL-SCNC: 137 MMOL/L
WBC # BLD AUTO: 9.2 K/UL

## 2018-10-26 PROCEDURE — 99284 EMERGENCY DEPT VISIT MOD MDM: CPT | Mod: 25

## 2018-10-26 PROCEDURE — 99284 EMERGENCY DEPT VISIT MOD MDM: CPT | Mod: 25,,, | Performed by: OBSTETRICS & GYNECOLOGY

## 2018-10-26 PROCEDURE — 85025 COMPLETE CBC W/AUTO DIFF WBC: CPT

## 2018-10-26 PROCEDURE — 96372 THER/PROPH/DIAG INJ SC/IM: CPT | Mod: 59

## 2018-10-26 PROCEDURE — 99999 PR PBB SHADOW E&M-EST. PATIENT-LVL III: CPT | Mod: PBBFAC,,, | Performed by: OBSTETRICS & GYNECOLOGY

## 2018-10-26 PROCEDURE — 87081 CULTURE SCREEN ONLY: CPT

## 2018-10-26 PROCEDURE — 63600175 PHARM REV CODE 636 W HCPCS: Performed by: OBSTETRICS & GYNECOLOGY

## 2018-10-26 PROCEDURE — 0502F SUBSEQUENT PRENATAL CARE: CPT | Mod: S$GLB,,, | Performed by: OBSTETRICS & GYNECOLOGY

## 2018-10-26 PROCEDURE — 82570 ASSAY OF URINE CREATININE: CPT

## 2018-10-26 PROCEDURE — 87147 CULTURE TYPE IMMUNOLOGIC: CPT

## 2018-10-26 PROCEDURE — 59025 FETAL NON-STRESS TEST: CPT

## 2018-10-26 PROCEDURE — 87184 SC STD DISK METHOD PER PLATE: CPT

## 2018-10-26 PROCEDURE — 59025 FETAL NON-STRESS TEST: CPT | Mod: 26,,, | Performed by: OBSTETRICS & GYNECOLOGY

## 2018-10-26 PROCEDURE — 80053 COMPREHEN METABOLIC PANEL: CPT

## 2018-10-26 RX ORDER — BETAMETHASONE SODIUM PHOSPHATE AND BETAMETHASONE ACETATE 3; 3 MG/ML; MG/ML
12 INJECTION, SUSPENSION INTRA-ARTICULAR; INTRALESIONAL; INTRAMUSCULAR; SOFT TISSUE ONCE
Status: COMPLETED | OUTPATIENT
Start: 2018-10-26 | End: 2018-10-26

## 2018-10-26 RX ADMIN — BETAMETHASONE SODIUM PHOSPHATE AND BETAMETHASONE ACETATE 12 MG: 3; 3 INJECTION, SUSPENSION INTRA-ARTICULAR; INTRALESIONAL; INTRAMUSCULAR at 04:10

## 2018-10-26 NOTE — PROGRESS NOTES
Taking home bp's this past week were 150/90s and yesterday had 160s/90s.  Did not notify us.  Has a headache and reports decreased FM yesterday.  GBBS done. To L&D for eval and possible delivery.

## 2018-10-26 NOTE — ED PROVIDER NOTES
Encounter Date: 10/26/2018       History     Chief Complaint   Patient presents with    Headache    Blurred Vision    Hypertension     Missy is a 39yo  at 36.0wga here for elevated BP readings yesterday at home.   She states that most of the day, her SBP was elevated in the 160's, but DBP never reached 90's.  It finally started going down last night after a warm bath, when SBP was in the 150's.   Today, she has had a HA, now 4/10.  She states that in the past, tylenol has not worked for her, so she took excedrin. She states this has helped her HA, but not resolved it.  She declines further medication at this time.   She has had no RUQ pain, no vision changes, no SOB.  Small amount of swelling in her extremities.   Normal FM, no gush of fluid or vaginal bleeding.  No contractions.             Review of patient's allergies indicates:  No Known Allergies  Past Medical History:   Diagnosis Date    Anxiety     Depression     Tobacco use      Past Surgical History:   Procedure Laterality Date    PELVIC LAPAROSCOPY      Age 15 and Age 25     Family History   Problem Relation Age of Onset    No Known Problems Mother     Heart disease Father     Hypertension Father     No Known Problems Sister     Cancer Maternal Grandmother     Breast cancer Maternal Grandmother     Kidney disease Maternal Grandfather     Depression Sister     Appendicitis Paternal Grandmother     Ovarian cancer Neg Hx     Colon cancer Neg Hx      Social History     Tobacco Use    Smoking status: Current Every Day Smoker     Packs/day: 0.50     Types: Cigarettes    Smokeless tobacco: Never Used   Substance Use Topics    Alcohol use: Yes     Comment: rare    Drug use: No     Review of Systems   Constitutional: Negative for activity change, appetite change, chills and fever.   Eyes: Negative for pain, redness and visual disturbance.   Respiratory: Negative for cough, shortness of breath and wheezing.    Cardiovascular: Positive for  leg swelling. Negative for palpitations.   Gastrointestinal: Negative for abdominal pain, constipation, diarrhea, nausea and vomiting.   Genitourinary: Negative for vaginal bleeding, vaginal discharge and vaginal pain.   Neurological: Positive for dizziness. Negative for seizures, speech difficulty and light-headedness.       Physical Exam     Initial Vitals [10/26/18 1600]   BP Pulse Resp Temp SpO2   134/84 101 18 98.4 °F (36.9 °C) 98 %      MAP       --         Physical Exam    Vitals reviewed.  Constitutional: She appears well-developed and well-nourished. She is not diaphoretic. No distress.   HENT:   Head: Normocephalic and atraumatic.   Eyes: Conjunctivae are normal. Right eye exhibits no discharge. Left eye exhibits no discharge.   Neck: Neck supple.   Cardiovascular: Normal rate and regular rhythm.   No murmur heard.  Pulmonary/Chest: Breath sounds normal. No respiratory distress. She has no wheezes.   Abdominal: Soft. There is no tenderness. There is no rebound and no guarding.   Gravid, fundus NT   Musculoskeletal: She exhibits edema (1+ bilateral).   Neurological: She is alert and oriented to person, place, and time.   Skin: Skin is warm and dry. No rash noted. No erythema.   Psychiatric: She has a normal mood and affect. Her behavior is normal. Thought content normal.         ED Course   Obtain Fetal nonstress test (NST)  Date/Time: 10/26/2018 4:54 PM  Performed by: Oumou Ledezma DO  Authorized by: Oumou Ledezma DO     Nonstress Test:     Variability:  6-25 BPM    Decelerations:  None    Accelerations:  15 bpm    Baseline:  135    Uterine Irritability: No      Contractions:  Regular  Biophysical Profile:     Nonstress Test Interpretation: reactive      Overall Impression:  Reassuring      Labs Reviewed   COMPREHENSIVE METABOLIC PANEL - Abnormal; Notable for the following components:       Result Value    CO2 17 (*)     BUN, Bld 3 (*)     Albumin 2.9 (*)     Alkaline Phosphatase 142 (*)     ALT <5  (*)     All other components within normal limits   CBC W/ AUTO DIFFERENTIAL - Abnormal; Notable for the following components:    RBC 3.57 (*)     Hemoglobin 10.7 (*)     Hematocrit 32.7 (*)     All other components within normal limits   PROTEIN / CREATININE RATIO, URINE - Abnormal; Notable for the following components:    Protein, Urine Random 39 (*)     Prot/Creat Ratio, Ur 0.27 (*)     All other components within normal limits          Imaging Results    None          Medical Decision Making:   ED Management:  BP's thus far normal to mild range  Pending pre-e labs  Will give 1st dose of BMZ                   ED Course as of Oct 26 1742   Fri Oct 26, 2018   1732 Serum labs negative, PC ratio 0.27.  Will get 24 hour urine for her to turn in Monday  She will come in tomorrow for BMZ and BP check. I gave her strict precautions for return.  Will d/c home in stable condition  [HU]      ED Course User Index  [HU] Oumou Ledezma DO     Clinical Impression:     1. Gestational hypertension w/o significant proteinuria in 3rd trimester    2. 36 weeks gestation of pregnancy           DO Oumou Varela DO  10/26/18 1742

## 2018-10-26 NOTE — DISCHARGE INSTRUCTIONS
Understanding Preeclampsia  Preeclampsia is pregnancy-related hypertension that develops after 20 weeks' gestation. It can lead to health risks for you and your baby. No one knows what causes preeclampsia. But it is known that the only cure is delivery.     Your blood pressure will be monitored regularly throughout your pregnancy to help check for preeclampsia.   Signs and symptoms  A common sign of preeclampsia is high blood pressure. Other signs and symptoms may include:  · Rapid weight gain  · Protein in your urine  · Headache  · Abdominal pain on your right side  · Vision problems (flashes or spots)  · Edema (swelling) in your face or hands (this also commonly happens near the end of normal pregnancies, even without preeclampsia)  Tests you may have  Your healthcare provider will want to check your blood pressure throughout your pregnancy. If your blood pressure is high, you may have the following tests:  · Urine tests to look for protein  · Blood tests to confirm preeclampsia  · Fetal monitoring to ensure that your baby is healthy  Treating preeclampsia  A daily low dose of aspirin may be prescribed to those at risk for preeclampsia. Preeclampsia almost always ends soon after you give birth. Until then, your healthcare provider can help manage your condition. If your symptoms are mild, you may need bed rest at home. If your symptoms are severe, you will be hospitalized. Hospital treatment includes:  · Complete bed rest to help control blood pressure  · Magnesium IV (intravenous) drip during labor to prevent seizures  · Induced labor or surgical delivery by  section  When to call your healthcare provider  Call your healthcare provider if swelling, weight gain, or other symptoms come on quickly or are severe. Some cases of preeclampsia are more severe than others. Your signs and symptoms also may change or worsen as you get closer to your due date.  Whos at risk?  Preeclampsia can happen in any  pregnant woman. Factors that increase the risk include:  · Previous pregnancies. Preeclampsia, intrauterine growth retardation (IUGR),  birth, placental abruption, or fetal death  · Medical history of mother. Diabetes, high blood pressure, obesity, kidney disease, autoimmune disease (for example lupus), or family history of preeclampsia  · Current pregnancy. First pregnancy, multiple fetuses, over the age of 40 years, or in vitro fertilization  Dangers of preeclampsia  If not treated, preeclampsia can cause problems for you and your baby. The placenta (organ that nourishes your baby) may tear away from the uterine wall. This can lead to fetal distress (the baby is at risk for health problems) and premature delivery. Preeclampsia can also cause these health problems:  · Kidney failure or other organ damage  · Seizures  · Stroke  Once you give birth  In most cases, preeclampsia goes away on its own soon after you give birth. Within days of delivery, your blood pressure, swelling, and other signs should decrease.  Date Last Reviewed: 2016  © 3952-5129 [employer]. [address] [place] [address], [place] [address]. All rights reserved. This information is not intended as a substitute for professional medical care. Always follow your healthcare professional's instructions.

## 2018-10-27 ENCOUNTER — NURSE TRIAGE (OUTPATIENT)
Dept: ADMINISTRATIVE | Facility: CLINIC | Age: 38
End: 2018-10-27

## 2018-10-27 NOTE — TELEPHONE ENCOUNTER
Reason for Disposition   Nursing judgment, per information in Reference    Protocols used: ST NO GUIDELINE AVAILABLE - ADVICE PER [id number]    Patient is transferred to [facility] on call doctor at [facility] [place].  Patient states she was awaken out of her sleep with numbness in both and warmth in thighs

## 2018-10-29 ENCOUNTER — TELEPHONE (OUTPATIENT)
Dept: OBSTETRICS AND GYNECOLOGY | Facility: CLINIC | Age: 38
End: 2018-10-29

## 2018-10-29 DIAGNOSIS — O12.13 GESTATIONAL PROTEINURIA IN THIRD TRIMESTER: Primary | ICD-10-CM

## 2018-10-29 NOTE — TELEPHONE ENCOUNTER
36 3/7 week OB went to the [facility] for high blood pressure.  She was discharged and instructed to do a 24 hour urine, return to [facility] if BP is 160/100 or higher, and to be seen prior to Friday.  She dropped off 24 hour urine this AM, /86, and scheduled with Dr. Ruby tomorrow (Tuesday).

## 2018-10-29 NOTE — TELEPHONE ENCOUNTER
Flori OB, 36 weeks and went to hospital over the weekend for high blood pressure and had to do 24 hour urine over the weekend due to protein in urine. Pt says she was told to call today to let us know what happened and to get in before Friday for an appt. BP is 148/86 this morning.

## 2018-10-30 ENCOUNTER — ANESTHESIA EVENT (OUTPATIENT)
Dept: OBSTETRICS AND GYNECOLOGY | Facility: OTHER | Age: 38
End: 2018-10-30
Payer: COMMERCIAL

## 2018-10-30 ENCOUNTER — ANESTHESIA (OUTPATIENT)
Dept: OBSTETRICS AND GYNECOLOGY | Facility: OTHER | Age: 38
End: 2018-10-30
Payer: COMMERCIAL

## 2018-10-30 ENCOUNTER — ROUTINE PRENATAL (OUTPATIENT)
Dept: OBSTETRICS AND GYNECOLOGY | Facility: CLINIC | Age: 38
End: 2018-10-30
Payer: COMMERCIAL

## 2018-10-30 ENCOUNTER — HOSPITAL ENCOUNTER (INPATIENT)
Facility: OTHER | Age: 38
LOS: 3 days | Discharge: HOME | End: 2018-11-02
Attending: OBSTETRICS & GYNECOLOGY | Admitting: OBSTETRICS & GYNECOLOGY
Payer: COMMERCIAL

## 2018-10-30 VITALS — WEIGHT: 205 LBS | SYSTOLIC BLOOD PRESSURE: 170 MMHG | DIASTOLIC BLOOD PRESSURE: 80 MMHG | BODY MASS INDEX: 34.12 KG/M2

## 2018-10-30 DIAGNOSIS — O14.10 SEVERE PRE-ECLAMPSIA: ICD-10-CM

## 2018-10-30 DIAGNOSIS — O14.13 SEVERE PRE-ECLAMPSIA IN THIRD TRIMESTER: ICD-10-CM

## 2018-10-30 PROBLEM — Z3A.36 36 WEEKS GESTATION OF PREGNANCY: Status: ACTIVE | Noted: 2018-10-30

## 2018-10-30 PROBLEM — O99.820 GBS (GROUP B STREPTOCOCCUS CARRIER), +RV CULTURE, CURRENTLY PREGNANT: Status: ACTIVE | Noted: 2018-10-30

## 2018-10-30 PROBLEM — E87.6 HYPOKALEMIA DUE TO LOSS OF POTASSIUM: Status: ACTIVE | Noted: 2018-10-30

## 2018-10-30 LAB
ABO + RH BLD: NORMAL
ALBUMIN SERPL BCP-MCNC: 2.8 G/DL
ALP SERPL-CCNC: 139 U/L
ALT SERPL W/O P-5'-P-CCNC: 6 U/L
ANION GAP SERPL CALC-SCNC: 13 MMOL/L
AST SERPL-CCNC: 13 U/L
BACTERIA SPEC AEROBE CULT: NORMAL
BACTERIA SPEC AEROBE CULT: NORMAL
BASOPHILS # BLD AUTO: 0.01 K/UL
BASOPHILS NFR BLD: 0.1 %
BILIRUB SERPL-MCNC: 0.3 MG/DL
BLD GP AB SCN CELLS X3 SERPL QL: NORMAL
BUN SERPL-MCNC: 5 MG/DL
CALCIUM SERPL-MCNC: 8.9 MG/DL
CHLORIDE SERPL-SCNC: 106 MMOL/L
CO2 SERPL-SCNC: 19 MMOL/L
CREAT SERPL-MCNC: 0.7 MG/DL
CREAT UR-MCNC: 204.3 MG/DL
DIFFERENTIAL METHOD: ABNORMAL
EOSINOPHIL # BLD AUTO: 0 K/UL
EOSINOPHIL NFR BLD: 0.3 %
ERYTHROCYTE [DISTWIDTH] IN BLOOD BY AUTOMATED COUNT: 13.5 %
EST. GFR  (AFRICAN AMERICAN): >60 ML/MIN/1.73 M^2
EST. GFR  (NON AFRICAN AMERICAN): >60 ML/MIN/1.73 M^2
GLUCOSE SERPL-MCNC: 100 MG/DL
HCT VFR BLD AUTO: 30.7 %
HGB BLD-MCNC: 10 G/DL
HIV1+2 IGG SERPL QL IA.RAPID: NEGATIVE
LYMPHOCYTES # BLD AUTO: 3.3 K/UL
LYMPHOCYTES NFR BLD: 32.1 %
MCH RBC QN AUTO: 29.8 PG
MCHC RBC AUTO-ENTMCNC: 32.6 G/DL
MCV RBC AUTO: 91 FL
MONOCYTES # BLD AUTO: 1.1 K/UL
MONOCYTES NFR BLD: 10.5 %
NEUTROPHILS # BLD AUTO: 5.8 K/UL
NEUTROPHILS NFR BLD: 56.2 %
PLATELET # BLD AUTO: 251 K/UL
PMV BLD AUTO: 11.5 FL
POTASSIUM SERPL-SCNC: 3.3 MMOL/L
PROT SERPL-MCNC: 6.4 G/DL
PROT UR-MCNC: 30 MG/DL
PROT/CREAT UR: 0.15 MG/G{CREAT}
RBC # BLD AUTO: 3.36 M/UL
SODIUM SERPL-SCNC: 138 MMOL/L
WBC # BLD AUTO: 10.35 K/UL

## 2018-10-30 PROCEDURE — 85025 COMPLETE CBC W/AUTO DIFF WBC: CPT

## 2018-10-30 PROCEDURE — 82570 ASSAY OF URINE CREATININE: CPT

## 2018-10-30 PROCEDURE — 36415 COLL VENOUS BLD VENIPUNCTURE: CPT

## 2018-10-30 PROCEDURE — 25000003 PHARM REV CODE 250: Performed by: OBSTETRICS & GYNECOLOGY

## 2018-10-30 PROCEDURE — 3E0P7VZ INTRODUCTION OF HORMONE INTO FEMALE REPRODUCTIVE, VIA NATURAL OR ARTIFICIAL OPENING: ICD-10-PCS | Performed by: OBSTETRICS & GYNECOLOGY

## 2018-10-30 PROCEDURE — 63600175 PHARM REV CODE 636 W HCPCS: Performed by: OBSTETRICS & GYNECOLOGY

## 2018-10-30 PROCEDURE — 99999 PR PBB SHADOW E&M-EST. PATIENT-LVL II: CPT | Mod: PBBFAC,,, | Performed by: OBSTETRICS & GYNECOLOGY

## 2018-10-30 PROCEDURE — 11000001 HC ACUTE MED/SURG PRIVATE ROOM

## 2018-10-30 PROCEDURE — 86592 SYPHILIS TEST NON-TREP QUAL: CPT

## 2018-10-30 PROCEDURE — 86703 HIV-1/HIV-2 1 RESULT ANTBDY: CPT

## 2018-10-30 PROCEDURE — 86901 BLOOD TYPING SEROLOGIC RH(D): CPT

## 2018-10-30 PROCEDURE — 80053 COMPREHEN METABOLIC PANEL: CPT

## 2018-10-30 PROCEDURE — 0502F SUBSEQUENT PRENATAL CARE: CPT | Mod: S$GLB,,, | Performed by: OBSTETRICS & GYNECOLOGY

## 2018-10-30 PROCEDURE — 86703 HIV-1/HIV-2 1 RESULT ANTBDY: CPT | Mod: 91

## 2018-10-30 RX ORDER — MISOPROSTOL 100 MCG
25 TABLET ORAL EVERY 4 HOURS
Status: DISCONTINUED | OUTPATIENT
Start: 2018-10-30 | End: 2018-10-30

## 2018-10-30 RX ORDER — OXYTOCIN/RINGER'S LACTATE 20/1000 ML
41.7 PLASTIC BAG, INJECTION (ML) INTRAVENOUS CONTINUOUS
Status: ACTIVE | OUTPATIENT
Start: 2018-10-30 | End: 2018-10-30

## 2018-10-30 RX ORDER — POTASSIUM CHLORIDE 20 MEQ/1
20 TABLET, EXTENDED RELEASE ORAL ONCE
Status: COMPLETED | OUTPATIENT
Start: 2018-10-30 | End: 2018-10-30

## 2018-10-30 RX ORDER — BUTALBITAL, ACETAMINOPHEN AND CAFFEINE 50; 325; 40 MG/1; MG/1; MG/1
2 TABLET ORAL ONCE
Status: COMPLETED | OUTPATIENT
Start: 2018-10-30 | End: 2018-10-30

## 2018-10-30 RX ORDER — SODIUM CHLORIDE, SODIUM LACTATE, POTASSIUM CHLORIDE, CALCIUM CHLORIDE 600; 310; 30; 20 MG/100ML; MG/100ML; MG/100ML; MG/100ML
INJECTION, SOLUTION INTRAVENOUS CONTINUOUS
Status: DISCONTINUED | OUTPATIENT
Start: 2018-10-30 | End: 2018-10-31

## 2018-10-30 RX ORDER — CALCIUM GLUCONATE 98 MG/ML
1 INJECTION, SOLUTION INTRAVENOUS
Status: DISCONTINUED | OUTPATIENT
Start: 2018-10-30 | End: 2018-10-31

## 2018-10-30 RX ORDER — SODIUM CHLORIDE 9 MG/ML
INJECTION, SOLUTION INTRAVENOUS
Status: DISCONTINUED | OUTPATIENT
Start: 2018-10-30 | End: 2018-10-31

## 2018-10-30 RX ORDER — TERBUTALINE SULFATE 1 MG/ML
0.25 INJECTION SUBCUTANEOUS
Status: DISCONTINUED | OUTPATIENT
Start: 2018-10-30 | End: 2018-10-31

## 2018-10-30 RX ORDER — MISOPROSTOL 100 MCG
25 TABLET ORAL ONCE
Status: COMPLETED | OUTPATIENT
Start: 2018-10-30 | End: 2018-10-30

## 2018-10-30 RX ORDER — MAGNESIUM SULFATE HEPTAHYDRATE 40 MG/ML
4 INJECTION, SOLUTION INTRAVENOUS ONCE
Status: COMPLETED | OUTPATIENT
Start: 2018-10-30 | End: 2018-10-30

## 2018-10-30 RX ORDER — SODIUM CHLORIDE, SODIUM LACTATE, POTASSIUM CHLORIDE, CALCIUM CHLORIDE 600; 310; 30; 20 MG/100ML; MG/100ML; MG/100ML; MG/100ML
1000 INJECTION, SOLUTION INTRAVENOUS CONTINUOUS
Status: DISCONTINUED | OUTPATIENT
Start: 2018-10-30 | End: 2018-10-31

## 2018-10-30 RX ORDER — CARBOPROST TROMETHAMINE 250 UG/ML
250 INJECTION, SOLUTION INTRAMUSCULAR ONCE
Status: DISCONTINUED | OUTPATIENT
Start: 2018-10-30 | End: 2018-10-31

## 2018-10-30 RX ORDER — CITALOPRAM 20 MG/1
20 TABLET, FILM COATED ORAL DAILY
Status: DISCONTINUED | OUTPATIENT
Start: 2018-10-30 | End: 2018-10-31

## 2018-10-30 RX ORDER — DIPHENHYDRAMINE HYDROCHLORIDE 50 MG/ML
12.5 INJECTION INTRAMUSCULAR; INTRAVENOUS ONCE
Status: COMPLETED | OUTPATIENT
Start: 2018-10-30 | End: 2018-10-30

## 2018-10-30 RX ORDER — MAGNESIUM SULFATE HEPTAHYDRATE 40 MG/ML
2 INJECTION, SOLUTION INTRAVENOUS CONTINUOUS
Status: DISPENSED | OUTPATIENT
Start: 2018-10-30 | End: 2018-11-01

## 2018-10-30 RX ORDER — ONDANSETRON 8 MG/1
8 TABLET, ORALLY DISINTEGRATING ORAL EVERY 8 HOURS PRN
Status: DISCONTINUED | OUTPATIENT
Start: 2018-10-30 | End: 2018-10-31

## 2018-10-30 RX ORDER — OXYTOCIN/RINGER'S LACTATE 20/1000 ML
333 PLASTIC BAG, INJECTION (ML) INTRAVENOUS CONTINUOUS
Status: ACTIVE | OUTPATIENT
Start: 2018-10-30 | End: 2018-10-30

## 2018-10-30 RX ORDER — METOCLOPRAMIDE HYDROCHLORIDE 5 MG/ML
10 INJECTION INTRAMUSCULAR; INTRAVENOUS ONCE
Status: COMPLETED | OUTPATIENT
Start: 2018-10-30 | End: 2018-10-30

## 2018-10-30 RX ORDER — MISOPROSTOL 200 UG/1
600 TABLET ORAL
Status: DISCONTINUED | OUTPATIENT
Start: 2018-10-30 | End: 2018-10-31

## 2018-10-30 RX ADMIN — DEXTROSE 5 MILLION UNITS: 50 INJECTION, SOLUTION INTRAVENOUS at 04:10

## 2018-10-30 RX ADMIN — MAGNESIUM SULFATE HEPTAHYDRATE 4 G: 40 INJECTION, SOLUTION INTRAVENOUS at 04:10

## 2018-10-30 RX ADMIN — Medication 25 MCG: at 04:10

## 2018-10-30 RX ADMIN — MAGNESIUM SULFATE HEPTAHYDRATE 2 G/HR: 40 INJECTION, SOLUTION INTRAVENOUS at 05:10

## 2018-10-30 RX ADMIN — METOCLOPRAMIDE 10 MG: 5 INJECTION, SOLUTION INTRAMUSCULAR; INTRAVENOUS at 09:10

## 2018-10-30 RX ADMIN — DEXTROSE 2.5 MILLION UNITS: 50 INJECTION, SOLUTION INTRAVENOUS at 09:10

## 2018-10-30 RX ADMIN — BUTALBITAL, ACETAMINOPHEN AND CAFFEINE 2 TABLET: 50; 325; 40 TABLET ORAL at 04:10

## 2018-10-30 RX ADMIN — CITALOPRAM HYDROBROMIDE 20 MG: 20 TABLET ORAL at 09:10

## 2018-10-30 RX ADMIN — SODIUM CHLORIDE, SODIUM LACTATE, POTASSIUM CHLORIDE, AND CALCIUM CHLORIDE: .6; .31; .03; .02 INJECTION, SOLUTION INTRAVENOUS at 04:10

## 2018-10-30 RX ADMIN — DIPHENHYDRAMINE HYDROCHLORIDE 12.5 MG: 50 INJECTION, SOLUTION INTRAMUSCULAR; INTRAVENOUS at 09:10

## 2018-10-30 RX ADMIN — Medication 25 MCG: at 09:10

## 2018-10-30 RX ADMIN — POTASSIUM CHLORIDE 20 MEQ: 1500 TABLET, EXTENDED RELEASE ORAL at 09:10

## 2018-10-30 NOTE — ASSESSMENT & PLAN NOTE
Will start induction of labor with cytotec  Magnesium for seizure ppx  fioricet for HA  Tubal ligation if CS  Consents for delivery and blood transfusion signed and questions answered

## 2018-10-30 NOTE — ANESTHESIA PREPROCEDURE EVALUATION
[facility]  Anesthesia Pre-Operative Evaluation       Patient Name: Missy Fernandez  YOB: 1980  MRN: [id number]    SUBJECTIVE:     Missy Fernandez is a 38 y.o. female  36w4d presenting for IOL 2/2  PreE with severe features.    Current pregnancy complicated by PreE with severe features, Hx of PreE, HTN.    Denies hx of seizures, strokes, HTN, heart failure, smoking, asthma, COPD, acid reflux, liver disease, kidney disease, bleeding disorders, taking blood thinners, back surgery.  Lab Results   Component Value Date     10/26/2018         For this pregnancy, patient would like epidural.    Denies previous issues with neuraxial anesthesia.  Denies previous issues with general anesthesia.  Denies family history of issues with anesthesia.  Past Surgical History:   Procedure Laterality Date    PELVIC LAPAROSCOPY      Age 15 and Age 25        OB History    Para Term  AB Living   3 2 2     2   SAB TAB Ectopic Multiple Live Births           2      # Outcome Date GA Lbr Henry/2nd Weight Sex Delivery Anes PTL Lv   3 Current            2 Term  40w0d  3.629 kg (8 lb) M Vag-Spont   AMILCAR      Birth Comments: pp preEclampsia admit for Magnesium sulfate   1 Term  40w0d  3.09 kg (6 lb 13 oz) F Vag-Spont   AMILCAR          Patient Active Problem List   Diagnosis    Generalized anxiety disorder    Tobacco use    Rh negative, antepartum    Request for sterilization    Endometriosis    Severe pre-eclampsia in third trimester    Severe pre-eclampsia       Review of patient's allergies indicates:  No Known Allergies    Social History     Socioeconomic History    Marital status:      Spouse name: Not on file    Number of children: Not on file    Years of education: Not on file    Highest education level: Not on file   Social Needs    Financial resource strain: Not on file    Food insecurity - worry: Not on file    Food insecurity - inability: Not on file     Transportation needs - medical: Not on file    Transportation needs - non-medical: Not on file   Occupational History    Not on file   Tobacco Use    Smoking status: Current Every Day Smoker     Packs/day: 0.50     Types: Cigarettes    Smokeless tobacco: Never Used   Substance and Sexual Activity    Alcohol use: Yes     Comment: rare    Drug use: No    Sexual activity: Yes     Partners: Male     Birth control/protection: None     Comment: :  Sonny   Other Topics Concern    Not on file   Social History Narrative    Not on file       OBJECTIVE:     Outpatient Medications:  No current facility-administered medications on file prior to encounter.      Current Outpatient Medications on File Prior to Encounter   Medication Sig Dispense Refill    acetaminophen (TYLENOL) 325 MG tablet Take 650 mg by mouth twice a week.      aspirin (ST CHACORTA ASPIRIN) 81 MG Chew Take 81 mg by mouth once daily.      citalopram (CELEXA) 20 MG tablet TAKE 1 TABLET BY MOUTH ONCE DAILY 30 tablet 9    prenatal vits62/FA/om3/dha/epa (PRENATAL GUMMY ORAL) Take by mouth.          Current Inpatient Medications:   butalbital-acetaminophen-caffeine -40 mg  2 tablet Oral Once    carboprost  250 mcg Intramuscular Once    magnesium sulfate in water  4 g Intravenous Once    miSOPROStol  25 mcg Vaginal Q4H    pencillin G potassium IVPB  2.5 Million Units Intravenous Q4H    pencillin G potassium IVPB  5 Million Units Intravenous Once       Wt Readings from Last 1 Encounters:   10/30/18 1033 93 kg (205 lb 0.4 oz)       BP Readings from Last 3 Encounters:   10/30/18 (!) 170/80   10/27/18 135/76   10/26/18 139/79         Chemistry        Component Value Date/Time     10/26/2018 1607    K 3.7 10/26/2018 1607     10/26/2018 1607    CO2 17 (L) 10/26/2018 1607    BUN 3 (L) 10/26/2018 1607    CREATININE 0.6 10/26/2018 1607    GLU 89 10/26/2018 1607        Component Value Date/Time    CALCIUM 8.8 10/26/2018 1607    ALKPHOS  142 (H) 10/26/2018 1607    AST 12 10/26/2018 1607    ALT <5 (L) 10/26/2018 1607    BILITOT 0.3 10/26/2018 1607    ESTGFRAFRICA >60 10/26/2018 1607    EGFRNONAA >60 10/26/2018 1607            Lab Results   Component Value Date    WBC 9.20 10/26/2018    HGB 10.7 (L) 10/26/2018    HCT 32.7 (L) 10/26/2018    MCV 92 10/26/2018     10/26/2018       No results for input(s): PT, INR, PROTIME, APTT in the last 72 hours.      Anesthesia Evaluation    I have reviewed the Patient Summary Reports.    I have reviewed the Nursing Notes.      Review of Systems  Anesthesia Hx:  History of prior surgery of interest to airway management or planning: Previous anesthesia: Spinal Denies Family Hx of Anesthesia complications.   Denies Personal Hx of Anesthesia complications.   Hematology/Oncology:  Hematology Normal   Oncology Normal     EENT/Dental:EENT/Dental Normal   Cardiovascular:   Hypertension    Pulmonary:  Pulmonary Normal    Renal/:  Renal/ Normal     Hepatic/GI:  Hepatic/GI Normal    Musculoskeletal:  Musculoskeletal Normal    Neurological:  Neurology Normal    Endocrine:  Endocrine Normal    Dermatological:  Skin Normal    Psych:  Psychiatric Normal           Physical Exam  General:  Well nourished, Obesity    Airway/Jaw/Neck:  Airway Findings: Mouth Opening: Normal Tongue: Normal  General Airway Assessment: Adult  Mallampati: II  TM Distance: Normal, at least 6 cm  Jaw/Neck Findings:  Neck ROM: Normal ROM  Neck Findings: Normal    Eyes/Ears/Nose:  EYES/EARS/NOSE FINDINGS: Normal   Dental:  Dental Findings: In tact   Chest/Lungs:  Chest/Lungs Findings: Normal Respiratory Rate     Heart/Vascular:  Heart Findings: Rate: Normal  Rhythm: Regular Rhythm        Mental Status:  Mental Status Findings: Normal        Anesthesia Plan  Type of Anesthesia, risks & benefits discussed:  Anesthesia Type:  general, CSE, epidural, spinal  Patient's Preference:   Intra-op Monitoring Plan: standard ASA monitors  Intra-op Monitoring  Plan Comments:   Post Op Pain Control Plan: multimodal analgesia, IV/PO Opioids PRN and per primary service following discharge from PACU  Post Op Pain Control Plan Comments:   Induction:   IV  Beta Blocker:  Patient is not currently on a Beta-Blocker (No further documentation required).       Informed Consent: Patient understands risks and agrees with Anesthesia plan.  Questions answered. Anesthesia consent signed with patient.  ASA Score: 2     Day of Surgery Review of History & Physical:    H&P update referred to the provider.         Ready For Surgery From Anesthesia Perspective.

## 2018-10-30 NOTE — H&P
[facility]  Obstetrics  History & Physical    Patient Name: Missy Fernandez  MRN: [id number]  Admission Date: 10/30/2018  Primary Care Provider: Primary Doctor No    Subjective:     Principal Problem : Pre-eclampsia with severe features    History of Present Illness:  Missy is a 39yo  at 36.4wga dx with pre-eclampsia with severe features due to HA not relieved with exedrine x 2 this AM and severe range BP in office.  She has also had dizziness, but no SOB/CP/RUQ ttp.   She has had normal FM, no contractions, gush of fluid or vaginal bleeding.   Prenatal care has been with Dr. Ruby and complicated by AMA.         Obstetric History       T2      L2     SAB0   TAB0   Ectopic0   Multiple0   Live Births2       # Outcome Date GA Lbr Henry/2nd Weight Sex Delivery Anes PTL Lv   3 Current            2 Term  40w0d  3.629 kg (8 lb) M Vag-Spont   AMILCAR      Name: Jason Mauro Term  40w0d  3.09 kg (6 lb 13 oz) F Vag-Spont   AMILCAR      Name: Elizabeth        Past Medical History:   Diagnosis Date    Anxiety     Depression     Severe pre-eclampsia in third trimester 10/30/2018    Tobacco use      Past Surgical History:   Procedure Laterality Date    PELVIC LAPAROSCOPY      Age 15 and Age 25       PTA Medications   Medication Sig    acetaminophen (TYLENOL) 325 MG tablet Take 650 mg by mouth twice a week.    aspirin (ST CHACORTA ASPIRIN) 81 MG Chew Take 81 mg by mouth once daily.    citalopram (CELEXA) 20 MG tablet TAKE 1 TABLET BY MOUTH ONCE DAILY    prenatal vits62/FA/om3/dha/epa (PRENATAL GUMMY ORAL) Take by mouth.       Review of patient's allergies indicates:  No Known Allergies     Family History     Problem Relation (Age of Onset)    Appendicitis Paternal Grandmother    Breast cancer Maternal Grandmother    Cancer Maternal Grandmother    Depression Sister    Heart disease Father    Hypertension Father    Kidney disease Maternal Grandfather    No Known Problems Mother, Sister        Tobacco  Use    Smoking status: Current Every Day Smoker     Packs/day: 0.50     Types: Cigarettes    Smokeless tobacco: Never Used   Substance and Sexual Activity    Alcohol use: Yes     Comment: rare    Drug use: No    Sexual activity: Yes     Partners: Male     Birth control/protection: None     Comment: :  Sonny     Review of Systems   Constitutional: Negative for activity change, appetite change, chills and fever.   Eyes: Negative for visual disturbance.   Respiratory: Negative for cough, shortness of breath and wheezing.    Cardiovascular: Negative for leg swelling.   Gastrointestinal: Negative for abdominal pain, constipation, diarrhea, nausea and vomiting.   Genitourinary: Negative for vaginal bleeding, vaginal discharge, vaginal pain and vaginal odor.   Neurological: Positive for headaches. Negative for seizures and numbness.   All other systems reviewed and are negative.     Objective:     Vital Signs (Most Recent):    Vital Signs (24h Range):  BP: (170)/(80) 170/80        There is no height or weight on file to calculate BMI.    FHT: 135 Cat 1 (reassuring)  TOCO:  none    Physical Exam:   Constitutional: She is oriented to person, place, and time. She appears well-developed and well-nourished. No distress.    HENT:   Head: Normocephalic and atraumatic.    Eyes: Conjunctivae are normal. Right eye exhibits no discharge. Left eye exhibits no discharge.    Neck: Normal range of motion.    Cardiovascular: Normal rate, regular rhythm and normal heart sounds.     Pulmonary/Chest: Effort normal and breath sounds normal. No respiratory distress.        Abdominal: Soft. There is no tenderness. There is no rebound and no guarding.   Gravid, fundus NT             Musculoskeletal: She exhibits no edema.       Neurological: She is alert and oriented to person, place, and time. She has normal reflexes.    Skin: Skin is warm and dry. No rash noted. She is not diaphoretic. No erythema.    Psychiatric: She has a normal  mood and affect. Her behavior is normal. Thought content normal.       Cervix:  Dilation:  1  Effacement:  50%  Station: -3  Presentation: Vertex     Significant Labs:  Lab Results   Component Value Date    GROUPTRH O NEG 06/15/2018    HEPBSAG Negative 06/15/2018    STREPBCULT  10/26/2018     Results called to and read back by:Nu Bruce  10/29/2018  09:56    STREPBCULT  10/26/2018     STREPTOCOCCUS AGALACTIAE (GROUP B)  Beta-hemolytic streptococci are routinely susceptible to   penicillins,cephalosporins and carbapenems.              Assessment/Plan:     38 y.o. female  at 36w4d for:    GBS (group B Streptococcus carrier), +RV culture, currently pregnant    Will start pcn     Severe pre-eclampsia    Will start induction of labor with cytotec  Magnesium for seizure ppx  fioricet for HA  Tubal ligation if CS  Consents for delivery and blood transfusion signed and questions answered     Rh negative state in antepartum period    Rhogam post-partum as needed         Oumou Ledezma, DO  Obstetrics  [facility]

## 2018-10-30 NOTE — PROGRESS NOTES
C/o headache this morning, took Excedrin with minimal relief.  Overall reports not feeling well and increased dizziness today.  Repeat bp 150/84.   PreEclampsia with severe features recommend delivery, to L&D for induction.

## 2018-10-30 NOTE — SUBJECTIVE & OBJECTIVE
Obstetric History       T2      L2     SAB0   TAB0   Ectopic0   Multiple0   Live Births2       # Outcome Date GA Lbr Henry/2nd Weight Sex Delivery Anes PTL Lv   3 Current            2 Term  40w0d  3.629 kg (8 lb) M Vag-Spont   AMILCAR      Name: Jason   1 Term  40w0d  3.09 kg (6 lb 13 oz) F Vag-Spont   AMILCAR      Name: Elizabeth        Past Medical History:   Diagnosis Date    Anxiety     Depression     Severe pre-eclampsia in third trimester 10/30/2018    Tobacco use      Past Surgical History:   Procedure Laterality Date    PELVIC LAPAROSCOPY      Age 15 and Age 25       PTA Medications   Medication Sig    acetaminophen (TYLENOL) 325 MG tablet Take 650 mg by mouth twice a week.    aspirin (ST CHACORTA ASPIRIN) 81 MG Chew Take 81 mg by mouth once daily.    citalopram (CELEXA) 20 MG tablet TAKE 1 TABLET BY MOUTH ONCE DAILY    prenatal vits62/FA/om3/dha/epa (PRENATAL GUMMY ORAL) Take by mouth.       Review of patient's allergies indicates:  No Known Allergies     Family History     Problem Relation (Age of Onset)    Appendicitis Paternal Grandmother    Breast cancer Maternal Grandmother    Cancer Maternal Grandmother    Depression Sister    Heart disease Father    Hypertension Father    Kidney disease Maternal Grandfather    No Known Problems Mother, Sister        Tobacco Use    Smoking status: Current Every Day Smoker     Packs/day: 0.50     Types: Cigarettes    Smokeless tobacco: Never Used   Substance and Sexual Activity    Alcohol use: Yes     Comment: rare    Drug use: No    Sexual activity: Yes     Partners: Male     Birth control/protection: None     Comment: :  Sonny     Review of Systems   Constitutional: Negative for activity change, appetite change, chills and fever.   Eyes: Negative for visual disturbance.   Respiratory: Negative for cough, shortness of breath and wheezing.    Cardiovascular: Negative for leg swelling.   Gastrointestinal: Negative for abdominal pain,  constipation, diarrhea, nausea and vomiting.   Genitourinary: Negative for vaginal bleeding, vaginal discharge, vaginal pain and vaginal odor.   Neurological: Positive for headaches. Negative for seizures and numbness.   All other systems reviewed and are negative.     Objective:     Vital Signs (Most Recent):    Vital Signs (24h Range):  BP: (170)/(80) 170/80        There is no height or weight on file to calculate BMI.    FHT: 135 Cat 1 (reassuring)  TOCO:  none    Physical Exam:   Constitutional: She is oriented to person, place, and time. She appears well-developed and well-nourished. No distress.    HENT:   Head: Normocephalic and atraumatic.    Eyes: Conjunctivae are normal. Right eye exhibits no discharge. Left eye exhibits no discharge.    Neck: Normal range of motion.    Cardiovascular: Normal rate, regular rhythm and normal heart sounds.     Pulmonary/Chest: Effort normal and breath sounds normal. No respiratory distress.        Abdominal: Soft. There is no tenderness. There is no rebound and no guarding.   Gravid, fundus NT             Musculoskeletal: She exhibits no edema.       Neurological: She is alert and oriented to person, place, and time. She has normal reflexes.    Skin: Skin is warm and dry. No rash noted. She is not diaphoretic. No erythema.    Psychiatric: She has a normal mood and affect. Her behavior is normal. Thought content normal.       Cervix:  Dilation:  1  Effacement:  50%  Station: -3  Presentation: Vertex     Significant Labs:  Lab Results   Component Value Date    GROUPTRH O NEG 06/15/2018    HEPBSAG Negative 06/15/2018    STREPBCULT  10/26/2018     Results called to and read back by:Nu Bruce  10/29/2018  09:56    STREPBCULT  10/26/2018     STREPTOCOCCUS AGALACTIAE (GROUP B)  Beta-hemolytic streptococci are routinely susceptible to   penicillins,cephalosporins and carbapenems.

## 2018-10-30 NOTE — HPI
Missy is a 39yo  at 36.4wga dx with pre-eclampsia with severe features due to HA not relieved with exedrine x 2 this AM and severe range BP in office.  She has also had dizziness, but no SOB/CP/RUQ ttp.   She has had normal FM, no contractions, gush of fluid or vaginal bleeding.   Prenatal care has been with Dr. Ruby and complicated by AMA.

## 2018-10-31 PROBLEM — O99.820 GBS (GROUP B STREPTOCOCCUS CARRIER), +RV CULTURE, CURRENTLY PREGNANT: Status: RESOLVED | Noted: 2018-10-30 | Resolved: 2018-10-31

## 2018-10-31 PROBLEM — Z3A.36 36 WEEKS GESTATION OF PREGNANCY: Status: RESOLVED | Noted: 2018-10-30 | Resolved: 2018-10-31

## 2018-10-31 LAB
HIV 1+2 AB+HIV1 P24 AG SERPL QL IA: NEGATIVE
RPR SER QL: NORMAL

## 2018-10-31 PROCEDURE — 59400 OBSTETRICAL CARE: CPT | Mod: ,,, | Performed by: OBSTETRICS & GYNECOLOGY

## 2018-10-31 PROCEDURE — 10907ZC DRAINAGE OF AMNIOTIC FLUID, THERAPEUTIC FROM PRODUCTS OF CONCEPTION, VIA NATURAL OR ARTIFICIAL OPENING: ICD-10-PCS | Performed by: OBSTETRICS & GYNECOLOGY

## 2018-10-31 PROCEDURE — 59400 OBSTETRICAL CARE: CPT | Mod: QY,,, | Performed by: ANESTHESIOLOGY

## 2018-10-31 PROCEDURE — 63600175 PHARM REV CODE 636 W HCPCS: Performed by: OBSTETRICS & GYNECOLOGY

## 2018-10-31 PROCEDURE — 25000003 PHARM REV CODE 250: Performed by: OBSTETRICS & GYNECOLOGY

## 2018-10-31 PROCEDURE — 27200710 HC EPIDURAL INFUSION PUMP SET: Performed by: ANESTHESIOLOGY

## 2018-10-31 PROCEDURE — 25000003 PHARM REV CODE 250: Performed by: ANESTHESIOLOGY

## 2018-10-31 PROCEDURE — 72100002 HC LABOR CARE, 1ST 8 HOURS

## 2018-10-31 PROCEDURE — 27800517 HC TRAY,EPIDURAL-CONTINUOUS: Performed by: ANESTHESIOLOGY

## 2018-10-31 PROCEDURE — 0502F SUBSEQUENT PRENATAL CARE: CPT | Mod: ICN,,, | Performed by: OBSTETRICS & GYNECOLOGY

## 2018-10-31 PROCEDURE — 72100003 HC LABOR CARE, EA. ADDL. 8 HRS

## 2018-10-31 PROCEDURE — 11000001 HC ACUTE MED/SURG PRIVATE ROOM

## 2018-10-31 PROCEDURE — 62326 NJX INTERLAMINAR LMBR/SAC: CPT | Performed by: ANESTHESIOLOGY

## 2018-10-31 PROCEDURE — 51702 INSERT TEMP BLADDER CATH: CPT

## 2018-10-31 PROCEDURE — 0HQ9XZZ REPAIR PERINEUM SKIN, EXTERNAL APPROACH: ICD-10-PCS | Performed by: OBSTETRICS & GYNECOLOGY

## 2018-10-31 RX ORDER — BUPIVACAINE HYDROCHLORIDE 2.5 MG/ML
INJECTION, SOLUTION EPIDURAL; INFILTRATION; INTRACAUDAL
Status: DISPENSED
Start: 2018-10-31 | End: 2018-10-31

## 2018-10-31 RX ORDER — CITALOPRAM 10 MG/1
20 TABLET ORAL DAILY
Status: DISCONTINUED | OUTPATIENT
Start: 2018-11-01 | End: 2018-10-31

## 2018-10-31 RX ORDER — OXYTOCIN/RINGER'S LACTATE 20/1000 ML
41.7 PLASTIC BAG, INJECTION (ML) INTRAVENOUS CONTINUOUS
Status: DISCONTINUED | OUTPATIENT
Start: 2018-10-31 | End: 2018-10-31

## 2018-10-31 RX ORDER — LIDOCAINE HYDROCHLORIDE AND EPINEPHRINE 15; 5 MG/ML; UG/ML
INJECTION, SOLUTION EPIDURAL
Status: DISCONTINUED | OUTPATIENT
Start: 2018-10-31 | End: 2018-10-31

## 2018-10-31 RX ORDER — OXYCODONE AND ACETAMINOPHEN 10; 325 MG/1; MG/1
1 TABLET ORAL EVERY 4 HOURS PRN
Status: DISCONTINUED | OUTPATIENT
Start: 2018-10-31 | End: 2018-11-02 | Stop reason: HOSPADM

## 2018-10-31 RX ORDER — HYDROCORTISONE 25 MG/G
CREAM TOPICAL 3 TIMES DAILY PRN
Status: DISCONTINUED | OUTPATIENT
Start: 2018-10-31 | End: 2018-11-02 | Stop reason: HOSPADM

## 2018-10-31 RX ORDER — LIDOCAINE HYDROCHLORIDE AND EPINEPHRINE 15; 5 MG/ML; UG/ML
INJECTION, SOLUTION EPIDURAL
Status: COMPLETED | OUTPATIENT
Start: 2018-10-31 | End: 2018-10-31

## 2018-10-31 RX ORDER — OXYTOCIN/RINGER'S LACTATE 20/1000 ML
10 PLASTIC BAG, INJECTION (ML) INTRAVENOUS ONCE
Status: DISCONTINUED | OUTPATIENT
Start: 2018-10-31 | End: 2018-10-31

## 2018-10-31 RX ORDER — ACETAMINOPHEN 325 MG/1
650 TABLET ORAL EVERY 6 HOURS PRN
Status: DISCONTINUED | OUTPATIENT
Start: 2018-10-31 | End: 2018-11-02 | Stop reason: HOSPADM

## 2018-10-31 RX ORDER — CITALOPRAM 10 MG/1
20 TABLET ORAL DAILY
Status: DISCONTINUED | OUTPATIENT
Start: 2018-10-31 | End: 2018-11-02 | Stop reason: HOSPADM

## 2018-10-31 RX ORDER — FENTANYL/BUPIVACAINE/NS/PF 2MCG/ML-.1
PLASTIC BAG, INJECTION (ML) INJECTION CONTINUOUS PRN
Status: DISCONTINUED | OUTPATIENT
Start: 2018-10-31 | End: 2018-10-31

## 2018-10-31 RX ORDER — BUTALBITAL, ACETAMINOPHEN AND CAFFEINE 50; 325; 40 MG/1; MG/1; MG/1
2 TABLET ORAL EVERY 6 HOURS PRN
Status: DISCONTINUED | OUTPATIENT
Start: 2018-10-31 | End: 2018-10-31

## 2018-10-31 RX ORDER — SODIUM CHLORIDE, SODIUM LACTATE, POTASSIUM CHLORIDE, CALCIUM CHLORIDE 600; 310; 30; 20 MG/100ML; MG/100ML; MG/100ML; MG/100ML
INJECTION, SOLUTION INTRAVENOUS CONTINUOUS
Status: DISCONTINUED | OUTPATIENT
Start: 2018-10-31 | End: 2018-10-31

## 2018-10-31 RX ORDER — DOCUSATE SODIUM 100 MG/1
200 CAPSULE, LIQUID FILLED ORAL 2 TIMES DAILY PRN
Status: DISCONTINUED | OUTPATIENT
Start: 2018-10-31 | End: 2018-11-02 | Stop reason: HOSPADM

## 2018-10-31 RX ORDER — SODIUM CITRATE AND CITRIC ACID MONOHYDRATE 334; 500 MG/5ML; MG/5ML
30 SOLUTION ORAL ONCE
Status: DISCONTINUED | OUTPATIENT
Start: 2018-10-31 | End: 2018-10-31

## 2018-10-31 RX ORDER — ONDANSETRON 8 MG/1
8 TABLET, ORALLY DISINTEGRATING ORAL EVERY 8 HOURS PRN
Status: DISCONTINUED | OUTPATIENT
Start: 2018-10-31 | End: 2018-11-02 | Stop reason: HOSPADM

## 2018-10-31 RX ORDER — OXYTOCIN/RINGER'S LACTATE 20/1000 ML
41.65 PLASTIC BAG, INJECTION (ML) INTRAVENOUS CONTINUOUS
Status: ACTIVE | OUTPATIENT
Start: 2018-10-31 | End: 2018-10-31

## 2018-10-31 RX ORDER — OXYCODONE AND ACETAMINOPHEN 5; 325 MG/1; MG/1
1 TABLET ORAL EVERY 4 HOURS PRN
Status: DISCONTINUED | OUTPATIENT
Start: 2018-10-31 | End: 2018-11-02 | Stop reason: HOSPADM

## 2018-10-31 RX ORDER — DIPHENHYDRAMINE HYDROCHLORIDE 50 MG/ML
25 INJECTION INTRAMUSCULAR; INTRAVENOUS EVERY 4 HOURS PRN
Status: DISCONTINUED | OUTPATIENT
Start: 2018-10-31 | End: 2018-11-02 | Stop reason: HOSPADM

## 2018-10-31 RX ORDER — OXYTOCIN/RINGER'S LACTATE 20/1000 ML
2 PLASTIC BAG, INJECTION (ML) INTRAVENOUS CONTINUOUS
Status: DISCONTINUED | OUTPATIENT
Start: 2018-10-31 | End: 2018-10-31

## 2018-10-31 RX ORDER — DIPHENHYDRAMINE HCL 25 MG
25 CAPSULE ORAL EVERY 4 HOURS PRN
Status: DISCONTINUED | OUTPATIENT
Start: 2018-10-31 | End: 2018-11-02 | Stop reason: HOSPADM

## 2018-10-31 RX ORDER — OXYTOCIN/RINGER'S LACTATE 20/1000 ML
333 PLASTIC BAG, INJECTION (ML) INTRAVENOUS CONTINUOUS
Status: DISCONTINUED | OUTPATIENT
Start: 2018-10-31 | End: 2018-10-31

## 2018-10-31 RX ORDER — FENTANYL/BUPIVACAINE/NS/PF 2MCG/ML-.1
PLASTIC BAG, INJECTION (ML) INJECTION CONTINUOUS
Status: DISCONTINUED | OUTPATIENT
Start: 2018-10-31 | End: 2018-10-31

## 2018-10-31 RX ORDER — IBUPROFEN 600 MG/1
600 TABLET ORAL EVERY 6 HOURS PRN
Status: DISCONTINUED | OUTPATIENT
Start: 2018-10-31 | End: 2018-11-02 | Stop reason: HOSPADM

## 2018-10-31 RX ORDER — FAMOTIDINE 10 MG/ML
20 INJECTION INTRAVENOUS ONCE
Status: DISCONTINUED | OUTPATIENT
Start: 2018-10-31 | End: 2018-10-31

## 2018-10-31 RX ORDER — BUTALBITAL, ACETAMINOPHEN AND CAFFEINE 50; 325; 40 MG/1; MG/1; MG/1
2 TABLET ORAL ONCE
Status: COMPLETED | OUTPATIENT
Start: 2018-10-31 | End: 2018-10-31

## 2018-10-31 RX ORDER — FENTANYL/BUPIVACAINE/NS/PF 2MCG/ML-.1
PLASTIC BAG, INJECTION (ML) INJECTION
Status: DISPENSED
Start: 2018-10-31 | End: 2018-10-31

## 2018-10-31 RX ADMIN — DOCUSATE SODIUM 200 MG: 100 CAPSULE, LIQUID FILLED ORAL at 08:10

## 2018-10-31 RX ADMIN — IBUPROFEN 600 MG: 600 TABLET ORAL at 08:10

## 2018-10-31 RX ADMIN — DEXTROSE 2.5 MILLION UNITS: 50 INJECTION, SOLUTION INTRAVENOUS at 09:10

## 2018-10-31 RX ADMIN — DEXTROSE 2.5 MILLION UNITS: 50 INJECTION, SOLUTION INTRAVENOUS at 02:10

## 2018-10-31 RX ADMIN — LIDOCAINE HYDROCHLORIDE,EPINEPHRINE BITARTRATE 3 ML: 15; .005 INJECTION, SOLUTION EPIDURAL; INFILTRATION; INTRACAUDAL; PERINEURAL at 02:10

## 2018-10-31 RX ADMIN — Medication 41.65 MILLI-UNITS/MIN: at 03:10

## 2018-10-31 RX ADMIN — BUTALBITAL, ACETAMINOPHEN AND CAFFEINE 2 TABLET: 50; 325; 40 TABLET ORAL at 02:10

## 2018-10-31 RX ADMIN — DEXTROSE 2.5 MILLION UNITS: 50 INJECTION, SOLUTION INTRAVENOUS at 01:10

## 2018-10-31 RX ADMIN — Medication 10 ML/HR: at 02:10

## 2018-10-31 RX ADMIN — BUTALBITAL, ACETAMINOPHEN AND CAFFEINE 2 TABLET: 50; 325; 40 TABLET ORAL at 11:10

## 2018-10-31 RX ADMIN — SODIUM CHLORIDE, SODIUM LACTATE, POTASSIUM CHLORIDE, AND CALCIUM CHLORIDE 1000 ML: .6; .31; .03; .02 INJECTION, SOLUTION INTRAVENOUS at 02:10

## 2018-10-31 RX ADMIN — OXYCODONE HYDROCHLORIDE AND ACETAMINOPHEN 1 TABLET: 10; 325 TABLET ORAL at 05:10

## 2018-10-31 RX ADMIN — CITALOPRAM HYDROBROMIDE 20 MG: 20 TABLET ORAL at 11:10

## 2018-10-31 RX ADMIN — DEXTROSE 2.5 MILLION UNITS: 50 INJECTION, SOLUTION INTRAVENOUS at 05:10

## 2018-10-31 RX ADMIN — MAGNESIUM SULFATE HEPTAHYDRATE 2 G/HR: 40 INJECTION, SOLUTION INTRAVENOUS at 02:10

## 2018-10-31 RX ADMIN — SODIUM CHLORIDE, SODIUM LACTATE, POTASSIUM CHLORIDE, AND CALCIUM CHLORIDE: .6; .31; .03; .02 INJECTION, SOLUTION INTRAVENOUS at 11:10

## 2018-10-31 RX ADMIN — Medication 2 MILLI-UNITS/MIN: at 02:10

## 2018-10-31 RX ADMIN — OXYCODONE HYDROCHLORIDE AND ACETAMINOPHEN 1 TABLET: 10; 325 TABLET ORAL at 09:10

## 2018-10-31 NOTE — PROGRESS NOTES
Patient delivered at 1417. Pitocin 500 mL bolus given post placenta delivery. Pitocin 125 mL/hr for 3.5 hours given. Fundus firm without massage. Lochia amount: light without odor. Patient urinated 100 mL of urine post vaginal delivery. Vital signs stable. Patient remained afebrile.Patient transported to Mother Baby unit. Continuous magnesium infusing. Pt has no complaints of: blurry vision, headache, dizziness, nausea or vomiting. Pt has adequate urine output, deep tendon reflexes are normal, blood pressures are within normal limits. TEDS are on bilateral lower extremities.No apparent distress noted.   Report given to Mother Baby nurse.

## 2018-10-31 NOTE — PROGRESS NOTES
LABOR PROGRESS NOTE    S:  MD to bedside for labor check. Complaints: Yes - feeling contractions.  Has mild headache returning    O: Temp:  [96.6 °F (35.9 °C)-98 °F (36.7 °C)] 96.6 °F (35.9 °C)  Pulse:  [] 78  Resp:  [16-18] 18  SpO2:  [92 %-98 %] 95 %  BP: (108-170)/(56-85) 129/60      FHT: 135 BPM/+moderate beat to beat variability/+accels/ no decels Cat 1 (reassuring)  CTX: q 2-4 minutes,s/p cytotec  SVE: 2.5/60/-3, soft  UOP: good, clear  MAU hose on        ASSESSMENT:   38 y.o.  at 36w5d, for induction due to severe preeclampsia    FHT reassuring    Active Hospital Problems    Diagnosis  POA    *Severe pre-eclampsia [O14.10]  Yes    GBS (group B Streptococcus carrier), +RV culture, currently pregnant [O99.820]  Not Applicable    36 weeks gestation of pregnancy [Z3A.36]  Not Applicable    Severe pre-eclampsia in third trimester [O14.13]  Yes    Hypokalemia due to loss of potassium [E87.6]  Yes    Request for sterilization [Z30.2]  Not Applicable    Rh negative state in antepartum period [O09.899, Z67.91]  Not Applicable      Resolved Hospital Problems   No resolved problems to display.         PLAN:    Labor management  Continue Close Maternal/Fetal Monitoring.   Pitocin Augmentation per protocol,   Recheck 2-4 hours or PRN      Karen Cuevas MD

## 2018-10-31 NOTE — L&D DELIVERY NOTE
[facility]  Vaginal Delivery   Operative Note    SUMMARY     Normal spontaneous vaginal delivery of live infant, was placed on mothers abdomen for skin to skin and bulb suctioning performed.  Infant delivered position ANTHONY over intact perineum.  Nuchal cord: No.    Spontaneous delivery of placenta and IV pitocin given noting good uterine tone.  1st degree laceration noted and repaired with 3-0 vicryl.  Patient tolerated delivery well. Sponge needle and lap counted correctly x2.    Indications: Severe pre-eclampsia  Pregnancy complicated by:   Patient Active Problem List   Diagnosis    Generalized anxiety disorder    Tobacco use    Rh negative state in antepartum period    Request for sterilization    Endometriosis    Severe pre-eclampsia in third trimester    Severe pre-eclampsia    GBS (group B Streptococcus carrier), +RV culture, currently pregnant    36 weeks gestation of pregnancy    Hypokalemia due to loss of potassium     Admitting GA: 36w5d    This patient has no babies on file.

## 2018-10-31 NOTE — PROGRESS NOTES
"Pt states that SCD's are causing her legs to "cramp." She requests to have them taken off. MAU hose remain in place.   "

## 2018-10-31 NOTE — PROGRESS NOTES
Labor Progress Note        Subjective:      Patient currently complaining of contraction pain and pelvic pressure.     Objective:      Temp:  [96.4 °F (35.8 °C)-98 °F (36.7 °C)] 96.4 °F (35.8 °C)  Pulse:  [] 75  Resp:  [16-18] 17  SpO2:  [92 %-100 %] 95 %  BP: (108-158)/(56-88) 132/68  Body mass index is 34.11 kg/m².     General: no acute distress  Electronic Fetal Monitoring:  FHT: 120 bpm, moderate variability, accelerations present, decelerations absent   Category: 1                 TOCO: Contractions: regular, every 2-3 minutes   Cervix:6/100/-2   Assessment:     1. IUP at  here for induction of labor     Plan:     1. Continue active management of labor. Pitocin at 20 mU.  2. Reassuring FHT  3. Epidural yes.   4. Membranes ruptured yes.   5. Recheck in 2 hours or prn.

## 2018-10-31 NOTE — PROGRESS NOTES
LABOR PROGRESS NOTE    S:  MD to bedside for labor/mag check. Complaints: Yes - still has headache -frontal and to left.  Fioricet x 2 did help; but now worse.    O: Temp:  [97.5 °F (36.4 °C)-98 °F (36.7 °C)] 97.5 °F (36.4 °C)  Pulse:  [] 88  Resp:  [16-18] 18  SpO2:  [93 %-98 %] 95 %  BP: (127-170)/(58-80) 127/58      FHT: Cat 1  CTX: occasional  SVE: /-3 soft, posterior  2+ DTR  Respiratory: unlabored        ASSESSMENT:   38 y.o.  at 36w4d, for induction of labor due to severe preeclampsia    FHT reassuring    Active Hospital Problems    Diagnosis  POA    *Severe pre-eclampsia [O14.10]  Yes    GBS (group B Streptococcus carrier), +RV culture, currently pregnant [O99.820]  Not Applicable    36 weeks gestation of pregnancy [Z3A.36]  Not Applicable    Severe pre-eclampsia in third trimester [O14.13]  Yes    Hypokalemia due to loss of potassium [E87.6]  Yes    Request for sterilization [Z30.2]  Not Applicable    Rh negative state in antepartum period [O09.899, Z67.91]  Not Applicable      Resolved Hospital Problems   No resolved problems to display.         PLAN:    Labor management  Continue Close Maternal/Fetal Monitoring.   Cytotec #2 to be given orally  Recheck 4 hours or PRN  Discussed Headache with patient, will give Reglan & Benadryl  Oral K+ replacement, due to renal losses in preEclampsia    Karen Cuevas MD

## 2018-10-31 NOTE — PROGRESS NOTES
Labor Progress Note        Subjective:      Patient currently doing well without complaints with epidural.     Objective:      Temp:  [96.6 °F (35.9 °C)-98 °F (36.7 °C)] 97 °F (36.1 °C)  Pulse:  [] 76  Resp:  [16-18] 16  SpO2:  [92 %-100 %] 98 %  BP: (108-170)/(56-88) 139/83  Body mass index is 34.11 kg/m².     General: no acute distress  Electronic Fetal Monitoring:  FHT: 125 bpm, moderate variability, accelerations present, decelerations absent   Category: 1                 TOCO: Contractions: regular, every 3 minutes   Cervix:4/80/-2 AROM clear   Assessment:     1. IUP at  here for induction of labor     Plan:     1. Continue active management of labor. Pitocin at 13 mU.  2. Reassuring FHT  3. Epidural yes.   4. Membranes ruptured yes.   5. Recheck in 4 hours or prn.

## 2018-10-31 NOTE — PROGRESS NOTES
LABOR PROGRESS NOTE    S:  MD to bedside for labor check. Complaints: No.  No headache; feeling tired.    O: Temp:  [96.6 °F (35.9 °C)-98 °F (36.7 °C)] 97.2 °F (36.2 °C)  Pulse:  [] 79  Resp:  [16-18] 16  SpO2:  [92 %-100 %] 95 %  BP: (108-170)/(56-87) 125/67      FHT: 130 BPM/+ moderate beat to beat variability/+accels/no decels Cat 1 (reassuring)  CTX: q 2-4 minutes, pitocin 12  SVE: 4.5/60/-3 cephalic; mid  Mcgee in place - good UOP  2+ DTR  Patient repositioned with peanut ball      ASSESSMENT:   38 y.o.  at 36w5d, for induction due to severe preE    FHT reassuring    Active Hospital Problems    Diagnosis  POA    *Severe pre-eclampsia [O14.10]  Yes    GBS (group B Streptococcus carrier), +RV culture, currently pregnant [O99.820]  Not Applicable    36 weeks gestation of pregnancy [Z3A.36]  Not Applicable    Severe pre-eclampsia in third trimester [O14.13]  Yes    Hypokalemia due to loss of potassium [E87.6]  Yes    Request for sterilization [Z30.2]  Not Applicable    Rh negative state in antepartum period [O09.899, Z67.91]  Not Applicable      Resolved Hospital Problems   No resolved problems to display.         PLAN:    Labor management  Continue Close Maternal/Fetal Monitoring.   Pitocin Augmentation per protocol,   Recheck 2 hours or PRN  Monitor BPs - normal to mild range currently    Karen Cuevas MD

## 2018-10-31 NOTE — PROGRESS NOTES
Labor Progress Note        Subjective:      Patient currently complaining of headache.     Objective:      Temp:  [96.6 °F (35.9 °C)-98 °F (36.7 °C)] 97 °F (36.1 °C)  Pulse:  [] 81  Resp:  [16-18] 16  SpO2:  [92 %-100 %] 96 %  BP: (108-158)/(56-88) 145/77  Body mass index is 34.11 kg/m².     General: no acute distress  Electronic Fetal Monitoring:  FHT: 130 bpm, moderate variability, accelerations present, decelerations absent   Category: 1                 TOCO: Contractions: regular, every 3-4 minutes   Cervix:5/80/-2   Assessment:     1. IUP at  here for induction of labor     Plan:     1. Continue active management of labor. Pitocin at 18 mU.  2. Reassuring FHT  3. Epidural yes.   4. Membranes ruptured yes.   5. Recheck in 4 hours or prn.

## 2018-10-31 NOTE — ANESTHESIA PROCEDURE NOTES
Epidural    Patient location during procedure: OB   Reason for block: primary anesthetic   Diagnosis: iup   Start time: 10/31/2018 2:15 AM  Timeout: 10/31/2018 2:12 AM  End time: 10/31/2018 2:23 AM  Staffing  Anesthesiologist: Florina Hamilton MD  Resident/CRNA: Yolanda Velez MD  Performed: resident/CRNA   Preanesthetic Checklist  Completed: patient identified, site marked, surgical consent, pre-op evaluation, timeout performed, IV checked, risks and benefits discussed, monitors and equipment checked, anesthesia consent given, hand hygiene performed and patient being monitored  Preparation  Patient position: sitting  Prep: ChloraPrep  Patient monitoring: Pulse Ox and Blood Pressure  Epidural  Skin Anesthetic: lidocaine 1%  Skin Wheal: 3 mL  Administration type: continuous  Approach: midline  Interspace: L4-5  Injection technique: CHACE air  Needle and Epidural Catheter  Needle type: Tuohy   Needle gauge: 17  Needle length: 3.5 inches  Needle insertion depth: 5.5 cm  Catheter type: springwound and multi-orifice  Catheter size: 18 G  Catheter at skin depth: 10.5 cm  Test dose: 3 mL of lidocaine 1.5% with Epi 1-to-200,000  Additional Documentation: incremental injection, no paresthesia on injection, no signs/symptoms of IV or SA injection, no significant complaints from patient and no significant pain on injection  Needle localization: anatomical landmarks  Medications:  Volume per aspiration: 5 mL  Time between aspirations: 3 minutes  Assessment  Ease of block: easy  Patient's tolerance of the procedure: comfortable throughout block and no complaints

## 2018-11-01 PROBLEM — O14.13 SEVERE PRE-ECLAMPSIA IN THIRD TRIMESTER: Status: RESOLVED | Noted: 2018-10-30 | Resolved: 2018-11-01

## 2018-11-01 PROBLEM — Z30.2 REQUEST FOR STERILIZATION: Status: RESOLVED | Noted: 2018-07-06 | Resolved: 2018-11-01

## 2018-11-01 LAB
ABO + RH BLD: NORMAL
BASOPHILS # BLD AUTO: 0.01 K/UL
BASOPHILS NFR BLD: 0.1 %
BLD GP AB SCN CELLS X3 SERPL QL: NORMAL
DIFFERENTIAL METHOD: ABNORMAL
EOSINOPHIL # BLD AUTO: 0.1 K/UL
EOSINOPHIL NFR BLD: 0.7 %
ERYTHROCYTE [DISTWIDTH] IN BLOOD BY AUTOMATED COUNT: 13.7 %
FETAL CELL SCN BLD QL ROSETTE: NORMAL
HCT VFR BLD AUTO: 30.1 %
HGB BLD-MCNC: 9.7 G/DL
LYMPHOCYTES # BLD AUTO: 3 K/UL
LYMPHOCYTES NFR BLD: 23.2 %
MCH RBC QN AUTO: 29.3 PG
MCHC RBC AUTO-ENTMCNC: 32.2 G/DL
MCV RBC AUTO: 91 FL
MONOCYTES # BLD AUTO: 1.3 K/UL
MONOCYTES NFR BLD: 10 %
NEUTROPHILS # BLD AUTO: 8.5 K/UL
NEUTROPHILS NFR BLD: 65.7 %
PLATELET # BLD AUTO: 223 K/UL
PMV BLD AUTO: 11.3 FL
RBC # BLD AUTO: 3.31 M/UL
WBC # BLD AUTO: 12.96 K/UL

## 2018-11-01 PROCEDURE — 36415 COLL VENOUS BLD VENIPUNCTURE: CPT

## 2018-11-01 PROCEDURE — 99024 POSTOP FOLLOW-UP VISIT: CPT | Mod: ,,, | Performed by: OBSTETRICS & GYNECOLOGY

## 2018-11-01 PROCEDURE — 85461 HEMOGLOBIN FETAL: CPT

## 2018-11-01 PROCEDURE — 11000001 HC ACUTE MED/SURG PRIVATE ROOM

## 2018-11-01 PROCEDURE — 72100003 HC LABOR CARE, EA. ADDL. 8 HRS

## 2018-11-01 PROCEDURE — 25000003 PHARM REV CODE 250: Performed by: OBSTETRICS & GYNECOLOGY

## 2018-11-01 PROCEDURE — 72100002 HC LABOR CARE, 1ST 8 HOURS

## 2018-11-01 PROCEDURE — 85461 HEMOGLOBIN FETAL: CPT | Mod: 91

## 2018-11-01 PROCEDURE — 86901 BLOOD TYPING SEROLOGIC RH(D): CPT

## 2018-11-01 PROCEDURE — 85025 COMPLETE CBC W/AUTO DIFF WBC: CPT

## 2018-11-01 PROCEDURE — 72200005 HC VAGINAL DELIVERY LEVEL II

## 2018-11-01 RX ORDER — SIMETHICONE 80 MG
1 TABLET,CHEWABLE ORAL 3 TIMES DAILY PRN
Status: DISCONTINUED | OUTPATIENT
Start: 2018-11-01 | End: 2018-11-01

## 2018-11-01 RX ORDER — SIMETHICONE 80 MG
1 TABLET,CHEWABLE ORAL 3 TIMES DAILY PRN
Status: DISCONTINUED | OUTPATIENT
Start: 2018-11-01 | End: 2018-11-02 | Stop reason: HOSPADM

## 2018-11-01 RX ADMIN — CITALOPRAM HYDROBROMIDE 20 MG: 20 TABLET ORAL at 10:11

## 2018-11-01 RX ADMIN — IBUPROFEN 600 MG: 600 TABLET ORAL at 02:11

## 2018-11-01 RX ADMIN — OXYCODONE HYDROCHLORIDE AND ACETAMINOPHEN 1 TABLET: 10; 325 TABLET ORAL at 08:11

## 2018-11-01 RX ADMIN — OXYCODONE HYDROCHLORIDE AND ACETAMINOPHEN 1 TABLET: 10; 325 TABLET ORAL at 02:11

## 2018-11-01 RX ADMIN — OXYCODONE HYDROCHLORIDE AND ACETAMINOPHEN 1 TABLET: 10; 325 TABLET ORAL at 06:11

## 2018-11-01 RX ADMIN — IBUPROFEN 600 MG: 600 TABLET ORAL at 08:11

## 2018-11-01 RX ADMIN — IBUPROFEN 600 MG: 600 TABLET ORAL at 06:11

## 2018-11-01 RX ADMIN — OXYCODONE HYDROCHLORIDE AND ACETAMINOPHEN 1 TABLET: 5; 325 TABLET ORAL at 10:11

## 2018-11-01 RX ADMIN — OXYCODONE HYDROCHLORIDE AND ACETAMINOPHEN 1 TABLET: 10; 325 TABLET ORAL at 03:11

## 2018-11-01 NOTE — HOSPITAL COURSE
Missy had  without complications, and was continued on magnesium for seizure prophylaxis  PPD1: Missy is doing well without major complaints.  No s/sx of pre-e.  She does desire circumcision, but baby is being watched for blood sugars and temperature at this time  PPD2: Patient is without complaint, no s/s pre-eclampsia. Baby was deferred for circumcision due to torsion. Mood is stable on Celexa, will continue postpartum.

## 2018-11-01 NOTE — PROGRESS NOTES
Missy is doing well without major complaints.      Vitals:    11/01/18 0700 11/01/18 0800 11/01/18 0900 11/01/18 1000   BP: 117/77 (!) 147/89 137/73 138/77   Pulse: 85 72 92 83   Resp:       Temp:  98.3 °F (36.8 °C)     TempSrc:       SpO2: 95% 97% 98% 97%   Weight:       Height:         NAD  Respirations non-labored    Will d/c mag at 2pm  Continue routine post-op care    Oumou Ledezma DO

## 2018-11-01 NOTE — DISCHARGE INSTRUCTIONS
Breastfeeding Discharge Instructions       Feed the baby at the earliest sign of hunger or comfort  o Hands to mouth, sucking motions  o Rooting or searching for something to suck on  o Dont wait for crying - it is a sign of distress     The feedings may be 8-12 times per 24hrs and will not follow a schedule   Avoid pacifiers and bottles for the first 4 weeks   Alternate the breast you start the feeding with, or start with the breast that feels the fullest   Switch breasts when the baby takes himself off the breast or falls asleep   Keep offering breasts until the baby looks full, no longer gives hunger signs, and stays asleep when placed on his back in the crib   If the baby is sleepy and wont wake for a feeding, put the baby skin-to-skin dressed in a diaper against the mothers bare chest   Sleep near your baby   The baby should be positioned and latched on to the breast correctly  o Chest-to-chest, chin in the breast  o Babys lips are flipped outward  o Babys mouth is stretched open wide like a shout  o Babys sucking should feel like tugging to the mother  - The baby should be drinking at the breast:  o You should hear swallowing or gulping throughout the feeding  o You should see milk on the babys lips when he comes off the breast  o Your breasts should be softer when the baby is finished feeding  o The baby should look relaxed at the end of feedings  o After the 4th day and your milk is in:  o The babys poop should turn bright yellow and be loose, watery, and seedy  o The baby should have at least 3-4 poops the size of the palm of your hand per day  o The baby should have at least 5-6 wet diapers per day  o The urine should be light yellow in color  You should drink when you are thirsty and eat a healthy diet when you are    hungry.     Take naps to get the rest you need.   Take medications and/or drink alcohol only with permission of your obstetrician    or the babys pediatrician.  You can  also call the [facility],   ([phone]), Monday-Friday, 8am-5pm Central time, to get the most   up-to-date evidence-based information on the use of medications during   pregnancy and breastfeeding.      The baby should be examined by a pediatrician at 3-5 days of age.  Once your   milk comes in, the baby should be gaining at least ½ - 1oz each day and should be back to birthweight no later than 10-14 days of age.          Community Resources    [facility] Breastfeeding Warmline: [phone]   Local [facility] clinics: provide incentives and breastpumps to eligible mothers  [employer] ([employer]LI):  mother-to-mother support group website        www.[employer]  Local La Leche League mother-to-mother support groups:        www.Vigno        La Leche League [facility]   Dr. Raghu Flanagan website for latch videos and general information:        www.breastfeedinginc.ca  [facility] is a call center that provides information about the safety of taking medications while breastfeeding.  Call 1-894.350.9005, M-F, 8am-5pm, [place].  International Lactation Consultant Association provides resources for assistance:        www.ilca.org  [place] Breastfeeding Coalition provides informationand resources for parents  and the community    www.LaBreastfeedingSupport.org     Arina Herrera is a mom-to-mom support group:                             www.[employer].com//breastfeedng-support/  [employer] for Healthy Babies:  4-465-018-BABY(8450)  [employer]: a breastfeeding support group for women of color, [phone]

## 2018-11-01 NOTE — ANESTHESIA POSTPROCEDURE EVALUATION
"Anesthesia Post Evaluation    Patient: Missy Fernandez    Procedure(s) Performed: * No procedures listed *    Final Anesthesia Type: epidural  Patient location during evaluation: labor & delivery  Patient participation: Yes- Able to Participate  Level of consciousness: awake and alert  Post-procedure vital signs: reviewed and stable  Pain management: adequate  Airway patency: patent  PONV status at discharge: No PONV  Anesthetic complications: no      Cardiovascular status: blood pressure returned to baseline, hemodynamically stable and stable  Respiratory status: unassisted  Hydration status: euvolemic  Follow-up not needed.        Visit Vitals  /79   Pulse 77   Temp 37.1 °C (98.8 °F)   Resp 18   Ht 5' 5" (1.651 m)   Wt 93 kg (205 lb)   SpO2 96%   Breastfeeding? Yes   BMI 34.11 kg/m²       Pain/Concetta Score: Pain Rating Prior to Med Admin: 6 (11/1/2018  2:40 PM)  Pain Rating Post Med Admin: 5 (10/31/2018 10:07 PM)        "

## 2018-11-01 NOTE — SUBJECTIVE & OBJECTIVE
Hospital course: Missy had  without complications, and was continued on magnesium for seizure prophylaxis  PPD1: Missy is doing well without major complaints.  No s/sx of pre-e.  She does desire circumcision, but baby is being watched for blood sugars and temperature at this time    She is doing well this morning. She is tolerating a regular diet without nausea or vomiting. She is voiding spontaneously. She is ambulating. She has passed flatus, and has a BM. Vaginal bleeding is mild. She denies fever or chills. Abdominal pain is mild and controlled with oral medications. She is breastfeeding. She desires circumcision for her male baby: yes.    Objective:     Vital Signs (Most Recent):  Temp: 98.3 °F (36.8 °C) (18 0304)  Pulse: 72 (18 0800)  Resp: 17 (10/31/18 1705)  BP: (!) 147/89 (18 0800)  SpO2: 97 % (18 0800) Vital Signs (24h Range):  Temp:  [96.4 °F (35.8 °C)-98.9 °F (37.2 °C)] 98.3 °F (36.8 °C)  Pulse:  [] 72  Resp:  [16-17] 17  SpO2:  [93 %-98 %] 97 %  BP: (110-158)/(56-89) 147/89     Weight: 93 kg (205 lb)  Body mass index is 34.11 kg/m².      Intake/Output Summary (Last 24 hours) at 2018 0911  Last data filed at 2018 0700  Gross per 24 hour   Intake 3637.53 ml   Output 2323.2 ml   Net 1314.33 ml       Significant Labs:  Lab Results   Component Value Date    GROUPTRH O NEG 2018    HEPBSAG Negative 06/15/2018    STREPBCULT  10/26/2018     Results called to and read back by:Nu Bruce  10/29/2018  09:56    STREPBCULT  10/26/2018     STREPTOCOCCUS AGALACTIAE (GROUP B)  Beta-hemolytic streptococci are routinely susceptible to   penicillins,cephalosporins and carbapenems.       Recent Labs   Lab 18  0429   HGB 9.7*   HCT 30.1*       I have personallly reviewed all pertinent lab results from the last 24 hours.  Recent Lab Results       18  0429        Baso # 0.01     Basophil% 0.1     Differential Method Automated     Eos # 0.1     Eosinophil% 0.7      Gran # (ANC) 8.5     Gran% 65.7     Group & Rh O NEG     Hematocrit 30.1     Hemoglobin 9.7     INDIRECT SETH NEG     Lymph # 3.0     Lymph% 23.2     MCH 29.3     MCHC 32.2     MCV 91     Mono # 1.3     Mono% 10.0     MPV 11.3     Platelets 223     RBC 3.31     RDW 13.7     WBC 12.96           Physical Exam:   Constitutional: She is oriented to person, place, and time. She appears well-developed and well-nourished. No distress.    HENT:   Head: Normocephalic and atraumatic.       Pulmonary/Chest: Effort normal. No respiratory distress.        Abdominal: Soft. She exhibits no distension. There is no tenderness. There is no rebound and no guarding.   Fundus firm, NT, below umbilicus                   Neurological: She is alert and oriented to person, place, and time.    Skin: Skin is warm and dry. She is not diaphoretic.    Psychiatric: She has a normal mood and affect.

## 2018-11-01 NOTE — PROGRESS NOTES
[facility]  Obstetrics  Postpartum Progress Note    Patient Name: Missy Fernandez  MRN: [id number]  Admission Date: 10/30/2018  Hospital Length of Stay: 2 days  Attending Physician: [place] Ruby MD  Primary Care Provider: Primary Doctor No    Subjective:     Principal Problem:Vaginal delivery    Hospital course: Missy had  without complications, and was continued on magnesium for seizure prophylaxis  PPD1: Missy is doing well without major complaints.  No s/sx of pre-e.  She does desire circumcision, but baby is being watched for blood sugars and temperature at this time    She is doing well this morning. She is tolerating a regular diet without nausea or vomiting. She is voiding spontaneously. She is ambulating. She has passed flatus, and has a BM. Vaginal bleeding is mild. She denies fever or chills. Abdominal pain is mild and controlled with oral medications. She is breastfeeding. She desires circumcision for her male baby: yes.    Objective:     Vital Signs (Most Recent):  Temp: 98.3 °F (36.8 °C) (18 0304)  Pulse: 72 (18 0800)  Resp: 17 (10/31/18 1705)  BP: (!) 147/89 (18 0800)  SpO2: 97 % (18 0800) Vital Signs (24h Range):  Temp:  [96.4 °F (35.8 °C)-98.9 °F (37.2 °C)] 98.3 °F (36.8 °C)  Pulse:  [] 72  Resp:  [16-17] 17  SpO2:  [93 %-98 %] 97 %  BP: (110-158)/(56-89) 147/89     Weight: 93 kg (205 lb)  Body mass index is 34.11 kg/m².      Intake/Output Summary (Last 24 hours) at 2018 0911  Last data filed at 2018 0700  Gross per 24 hour   Intake 3637.53 ml   Output 2323.2 ml   Net 1314.33 ml       Significant Labs:  Lab Results   Component Value Date    GROUPTRH O NEG 2018    HEPBSAG Negative 06/15/2018    STREPBCULT  10/26/2018     Results called to and read back by:Nu Bruce  10/29/2018  09:56    STREPBCULT  10/26/2018     STREPTOCOCCUS AGALACTIAE (GROUP B)  Beta-hemolytic streptococci are routinely susceptible to   penicillins,cephalosporins  and carbapenems.       Recent Labs   Lab 18   HGB 9.7*   HCT 30.1*       I have personallly reviewed all pertinent lab results from the last 24 hours.  Recent Lab Results       18        Baso # 0.01     Basophil% 0.1     Differential Method Automated     Eos # 0.1     Eosinophil% 0.7     Gran # (ANC) 8.5     Gran% 65.7     Group & Rh O NEG     Hematocrit 30.1     Hemoglobin 9.7     INDIRECT SETH NEG     Lymph # 3.0     Lymph% 23.2     MCH 29.3     MCHC 32.2     MCV 91     Mono # 1.3     Mono% 10.0     MPV 11.3     Platelets 223     RBC 3.31     RDW 13.7     WBC 12.96           Physical Exam:   Constitutional: She is oriented to person, place, and time. She appears well-developed and well-nourished. No distress.    HENT:   Head: Normocephalic and atraumatic.       Pulmonary/Chest: Effort normal. No respiratory distress.        Abdominal: Soft. She exhibits no distension. There is no tenderness. There is no rebound and no guarding.   Fundus firm, NT, below umbilicus                   Neurological: She is alert and oriented to person, place, and time.    Skin: Skin is warm and dry. She is not diaphoretic.    Psychiatric: She has a normal mood and affect.       Assessment/Plan:     38 y.o. female  for:    * Vaginal delivery    Good post-partum condition, continue routine post-partum care     Severe pre-eclampsia    Continue Magnesium for seizure ppx for 24h post-partum  Watch BP's closely     Rh negative state in antepartum period    Baby O+, will need rhogam         Disposition: As patient meets milestones, will plan to discharge PPD2.    Oumou Ledezma DO  Obstetrics  [facility]

## 2018-11-02 VITALS
OXYGEN SATURATION: 98 % | TEMPERATURE: 99 F | HEART RATE: 76 BPM | HEIGHT: 65 IN | SYSTOLIC BLOOD PRESSURE: 131 MMHG | BODY MASS INDEX: 34.16 KG/M2 | DIASTOLIC BLOOD PRESSURE: 77 MMHG | WEIGHT: 205 LBS | RESPIRATION RATE: 18 BRPM

## 2018-11-02 LAB
FETAL CELL SCN BLD QL ROSETTE: NORMAL
INJECT RH IG VOL PATIENT: NORMAL ML

## 2018-11-02 PROCEDURE — 63600519 RHOGAM PHARM REV CODE 636 ALT 250 W HCPCS: Performed by: OBSTETRICS & GYNECOLOGY

## 2018-11-02 PROCEDURE — 99024 POSTOP FOLLOW-UP VISIT: CPT | Mod: ,,, | Performed by: OBSTETRICS & GYNECOLOGY

## 2018-11-02 PROCEDURE — 25000003 PHARM REV CODE 250: Performed by: OBSTETRICS & GYNECOLOGY

## 2018-11-02 RX ORDER — DOCUSATE SODIUM 100 MG/1
200 CAPSULE, LIQUID FILLED ORAL 2 TIMES DAILY PRN
Refills: 0 | COMMUNITY
Start: 2018-11-02 | End: 2018-11-09

## 2018-11-02 RX ORDER — IBUPROFEN 600 MG/1
600 TABLET ORAL EVERY 6 HOURS PRN
Qty: 45 TABLET | Refills: 1 | Status: SHIPPED | OUTPATIENT
Start: 2018-11-02 | End: 2018-11-09

## 2018-11-02 RX ORDER — OXYCODONE AND ACETAMINOPHEN 5; 325 MG/1; MG/1
1 TABLET ORAL EVERY 4 HOURS PRN
Qty: 15 TABLET | Refills: 0 | Status: SHIPPED | OUTPATIENT
Start: 2018-11-02 | End: 2018-11-09

## 2018-11-02 RX ADMIN — DOCUSATE SODIUM 200 MG: 100 CAPSULE, LIQUID FILLED ORAL at 07:11

## 2018-11-02 RX ADMIN — HUMAN RHO(D) IMMUNE GLOBULIN 300 MCG: 300 INJECTION, SOLUTION INTRAMUSCULAR at 10:11

## 2018-11-02 RX ADMIN — OXYCODONE HYDROCHLORIDE AND ACETAMINOPHEN 1 TABLET: 5; 325 TABLET ORAL at 07:11

## 2018-11-02 RX ADMIN — IBUPROFEN 600 MG: 600 TABLET ORAL at 07:11

## 2018-11-02 NOTE — DISCHARGE SUMMARY
[facility]  Obstetrics  Discharge Summary      Patient Name: Missy Fernandez  MRN: [id number]  Admission Date: 10/30/2018  Hospital Length of Stay: 3 days  Discharge Date and Time:  2018 9:02 AM  Attending Physician: [place] Ruby MD   Discharging Provider: Roxanne Valderrama MD  Primary Care Provider: Primary Doctor No    HPI: Missy is a 39yo  at 36.4wga dx with pre-eclampsia with severe features due to HA not relieved with exedrine x 2 this AM and severe range BP in office.  She has also had dizziness, but no SOB/CP/RUQ ttp.   She has had normal FM, no contractions, gush of fluid or vaginal bleeding.   Prenatal care has been with Dr. Ruby and complicated by AMA.     * No surgery found *     Hospital Course:   Missy had  without complications, and was continued on magnesium for seizure prophylaxis  PPD1: Missy is doing well without major complaints.  No s/sx of pre-e.  She does desire circumcision, but baby is being watched for blood sugars and temperature at this time  PPD2: Patient is without complaint, no s/s pre-eclampsia. Baby was deferred for circumcision due to torsion. Mood is stable on Celexa, will continue postpartum.        Final Active Diagnoses:    Diagnosis Date Noted POA    PRINCIPAL PROBLEM:  Vaginal delivery [O80] 10/31/2018 Not Applicable    Severe pre-eclampsia [O14.10] 10/30/2018 Yes    Hypokalemia due to loss of potassium [E87.6] 10/30/2018 Yes    Rh negative state in antepartum period [O09.899, Z67.91] 2018 Not Applicable    Generalized anxiety disorder [F41.1] 10/25/2013 Yes      Problems Resolved During this Admission:    Diagnosis Date Noted Date Resolved POA    GBS (group B Streptococcus carrier), +RV culture, currently pregnant [O99.820] 10/30/2018 10/31/2018 Not Applicable    36 weeks gestation of pregnancy [Z3A.36] 10/30/2018 10/31/2018 Not Applicable    Severe pre-eclampsia in third trimester [O14.13] 10/30/2018 2018 Yes    Request for  sterilization [Z30.2] 07/06/2018 11/01/2018 Not Applicable            Feeding Method: breast    Immunizations     Date Immunization Status Dose Route/Site Given by    10/31/18 1834 MMR Incomplete 0.5 mL Subcutaneous/Left deltoid     10/31/18 1834 Tdap Incomplete 0.5 mL Intramuscular/Left deltoid     10/31/18 1834 Rho (D) Immune Globulin - IM Incomplete 300 mcg Intramuscular/     10/31/18 1835 Rho (D) Immune Globulin - IM Incomplete 300 mcg Intramuscular/           Delivery:    Episiotomy: None   Lacerations: 1st   Repair suture:     Repair # of packets: 1   Blood loss (ml): 115     Birth information:  YOB: 2018   Time of birth: 2:17 PM   Sex: male   Delivery type: Vaginal, Spontaneous   Gestational Age: 36w5d    Delivery Clinician:      Other providers:       Additional  information:  Forceps:    Vacuum:    Breech:    Observed anomalies      Living?:           APGARS  One minute Five minutes Ten minutes   Skin color:         Heart rate:         Grimace:         Muscle tone:         Breathing:         Totals: 7  8        Placenta: Delivered:       appearance    Pending Diagnostic Studies:     None          Discharged Condition: good    Disposition: Home or Self Care    Follow Up:  Follow-up Information     Keith Ruby MD In 1 week.    Specialties:  Obstetrics and Gynecology, Obstetrics, Gynecology  Why:  Blood pressure check  Contact information:  [address]  [address]  [place] [id number] [phone]             Keith Ruby MD In 6 weeks.    Specialties:  Obstetrics and Gynecology, Obstetrics, Gynecology  Why:  For post-partum check  Contact information:  [address]  [address]  [facility] [address] [phone]                 Patient Instructions:      Call MD for:  temperature >100.4     Call MD for:  persistent nausea and vomiting or diarrhea     Call MD for:  severe uncontrolled pain     Call MD for:  redness, tenderness, or signs of infection (pain, swelling, redness, odor or  green/yellow discharge around incision site)     No dressing needed     Medications:  Current Discharge Medication List      START taking these medications    Details   docusate sodium (COLACE) 100 MG capsule Take 2 capsules (200 mg total) by mouth 2 (two) times daily as needed for Constipation.  Refills: 0      ibuprofen (ADVIL,MOTRIN) 600 MG tablet Take 1 tablet (600 mg total) by mouth every 6 (six) hours as needed (cramping).  Qty: 45 tablet, Refills: 1      oxyCODONE-acetaminophen (PERCOCET) 5-325 mg per tablet Take 1 tablet by mouth every 4 (four) hours as needed.  Qty: 15 tablet, Refills: 0         CONTINUE these medications which have NOT CHANGED    Details   citalopram (CELEXA) 20 MG tablet TAKE 1 TABLET BY MOUTH ONCE DAILY  Qty: 30 tablet, Refills: 9      prenatal vits62/FA/om3/dha/epa (PRENATAL GUMMY ORAL) Take by mouth.         STOP taking these medications       acetaminophen (TYLENOL) 325 MG tablet Comments:   Reason for Stopping:         aspirin (ST CHACORTA ASPIRIN) 81 MG Chew Comments:   Reason for Stopping:               Roxanne Valderrama MD  Obstetrics  [facility]

## 2018-11-02 NOTE — ASSESSMENT & PLAN NOTE
Continue Magnesium for seizure ppx for 24h post-partum  Watch BP's closely, follow up appointment in 1 week.

## 2018-11-02 NOTE — SUBJECTIVE & OBJECTIVE
Hospital course: Missy had  without complications, and was continued on magnesium for seizure prophylaxis  PPD1: Missy is doing well without major complaints.  No s/sx of pre-e.  She does desire circumcision, but baby is being watched for blood sugars and temperature at this time  PPD2: Patient is without complaint, no s/s pre-eclampsia. Baby was deferred for circumcision due to torsion.    Interval History:     She is doing well this morning. She is tolerating a regular diet without nausea or vomiting. She is voiding spontaneously. She is ambulating. She has passed flatus, and has not a BM. Vaginal bleeding is mild. She denies fever or chills. Abdominal pain is mild and controlled with oral medications. She is breastfeeding.     Objective:     Vital Signs (Most Recent):  Temp: 99.1 °F (37.3 °C) (18 0800)  Pulse: 76 (18 08)  Resp: 18 (18 0800)  BP: 131/77 (18 0800)  SpO2: 98 % (18 08) Vital Signs (24h Range):  Temp:  [98.5 °F (36.9 °C)-99.1 °F (37.3 °C)] 99.1 °F (37.3 °C)  Pulse:  [73-95] 76  Resp:  [18] 18  SpO2:  [95 %-98 %] 98 %  BP: (114-151)/(61-87) 131/77     Weight: 93 kg (205 lb)  Body mass index is 34.11 kg/m².      Intake/Output Summary (Last 24 hours) at 2018 0855  Last data filed at 2018 1400  Gross per 24 hour   Intake 0 ml   Output 1900 ml   Net -1900 ml       Significant Labs:  Lab Results   Component Value Date    GROUPTRH O NEG 2018    HEPBSAG Negative 06/15/2018    STREPBCULT  10/26/2018     Results called to and read back by:Nu Bruce  10/29/2018  09:56    STREPBCULT  10/26/2018     STREPTOCOCCUS AGALACTIAE (GROUP B)  Beta-hemolytic streptococci are routinely susceptible to   penicillins,cephalosporins and carbapenems.       Recent Labs   Lab 18  0429   HGB 9.7*   HCT 30.1*       I have personallly reviewed all pertinent lab results from the last 24 hours.    Physical Exam:   Constitutional: She is oriented to person, place, and time.  She appears well-developed and well-nourished.       Cardiovascular: Normal rate.     Pulmonary/Chest: Effort normal.        Abdominal: Soft. She exhibits no abdominal incision. There is no tenderness (No fundal tenderness).             Musculoskeletal: She exhibits edema (1+ edema bilaterally). She exhibits no tenderness.       Neurological: She is alert and oriented to person, place, and time.     Psychiatric: She has a normal mood and affect.

## 2018-11-02 NOTE — ASSESSMENT & PLAN NOTE
Stable on Celexa, continue postpartum. Patient will follow up at 1 week for BP and mood evaluation.

## 2018-11-02 NOTE — PROGRESS NOTES
[facility]  Obstetrics  Postpartum Progress Note    Patient Name: Missy Fernandez  MRN: [id number]  Admission Date: 10/30/2018  Hospital Length of Stay: 3 days  Attending Physician: Keith Ruby MD  Primary Care Provider: Primary Doctor No    Subjective:     Principal Problem:Vaginal delivery    Hospital course: Missy had  without complications, and was continued on magnesium for seizure prophylaxis  PPD1: Missy is doing well without major complaints.  No s/sx of pre-e.  She does desire circumcision, but baby is being watched for blood sugars and temperature at this time  PPD2: Patient is without complaint, no s/s pre-eclampsia. Baby was deferred for circumcision due to torsion.    Interval History:     She is doing well this morning. She is tolerating a regular diet without nausea or vomiting. She is voiding spontaneously. She is ambulating. She has passed flatus, and has not a BM. Vaginal bleeding is mild. She denies fever or chills. Abdominal pain is mild and controlled with oral medications. She is breastfeeding.     Objective:     Vital Signs (Most Recent):  Temp: 99.1 °F (37.3 °C) (18 0800)  Pulse: 76 (18 0800)  Resp: 18 (18 0800)  BP: 131/77 (18 0800)  SpO2: 98 % (18 0800) Vital Signs (24h Range):  Temp:  [98.5 °F (36.9 °C)-99.1 °F (37.3 °C)] 99.1 °F (37.3 °C)  Pulse:  [73-95] 76  Resp:  [18] 18  SpO2:  [95 %-98 %] 98 %  BP: (114-151)/(61-87) 131/77     Weight: 93 kg (205 lb)  Body mass index is 34.11 kg/m².      Intake/Output Summary (Last 24 hours) at 2018 0855  Last data filed at 2018 1400  Gross per 24 hour   Intake 0 ml   Output 1900 ml   Net -1900 ml       Significant Labs:  Lab Results   Component Value Date    GROUPTRH O NEG 2018    HEPBSAG Negative 06/15/2018    STREPBCULT  10/26/2018     Results called to and read back by:Nu Bruce  10/29/2018  09:56    STREPBCULT  10/26/2018     STREPTOCOCCUS AGALACTIAE (GROUP B)  Beta-hemolytic  streptococci are routinely susceptible to   penicillins,cephalosporins and carbapenems.       Recent Labs   Lab 18  0429   HGB 9.7*   HCT 30.1*       I have personallly reviewed all pertinent lab results from the last 24 hours.    Physical Exam:   Constitutional: She is oriented to person, place, and time. She appears well-developed and well-nourished.       Cardiovascular: Normal rate.     Pulmonary/Chest: Effort normal.        Abdominal: Soft. She exhibits no abdominal incision. There is no tenderness (No fundal tenderness).             Musculoskeletal: She exhibits edema (1+ edema bilaterally). She exhibits no tenderness.       Neurological: She is alert and oriented to person, place, and time.     Psychiatric: She has a normal mood and affect.       Assessment/Plan:     38 y.o. female  for:    * Vaginal delivery    Good post-partum condition, continue routine post-partum care. Discharge instructions reviewed.     Severe pre-eclampsia    Continue Magnesium for seizure ppx for 24h post-partum  Watch BP's closely, follow up appointment in 1 week.     Rh negative state in antepartum period    Baby O+, will need rhogam before discharge.         Disposition: As patient meets milestones, will plan to discharge today.    Roxanne Valderrama MD  Obstetrics  [facility]

## 2018-11-02 NOTE — LACTATION NOTE
ASHLEY did discharge lactation teaching and reviewed the Mother's Breastfeeding Guide. LC answered all questions. Mother has  phone number  for questions after DC.   Mother may refer to the After Visit Summary for lactation instructions. Call for latch on check at next feeding.  Gave mother a new SNS to start using this PM at 1700. She has a pump at home for extra stimulation if baby does not nurse well 8 or more times a day. ASHLEY left phone number on mother's white board for mother to call for asst as needed.Told mother what time LC leaves the floor. Mother also told that ASHLEY can see when she calls [employer] phone and if LC does not answer, she is busy but will come as soon as possible.

## 2018-11-02 NOTE — LACTATION NOTE
LC called to the room to check the latch with SNS attached. Baby nursing well  In football on right. Mother states she is aware of how to clean SNS.

## 2018-11-02 NOTE — LACTATION NOTE
"   11/01/18 1310   Maternal Infant Assessment   Breast Shape Bilateral:;round   Breast Density Bilateral:;soft   Areola Bilateral:;elastic   Nipple(s) Bilateral:;everted   Infant Assessment   Sucking Reflex present   Rooting Reflex present   Swallow Reflex present   LATCH Score   Latch 1-->repeated attempts, holds nipple in mouth, stimulate to suck   Audible Swallowing 1-->a few with stimulation   Type Of Nipple 2-->everted (after stimulation)   Comfort (Breast/Nipple) 2-->soft/nontender   Hold (Positioning) 1-->minimal assist, teach one side: mother does other, staff holds   Score (less than 7 for 2/more consecutive times, consult Lactation Consultant) 7   Maternal Infant Feeding   Maternal Emotional State assist needed   Infant Positioning clutch/"football"   Signs of Milk Transfer audible swallow   Time Spent (min) 30-60 min   Latch Assistance yes   Breastfeeding History   Currently Breastfeeding yes   Feeding Infant   Audible Swallow yes   Suck/Swallow Coordination present  (sns used)   Skin-to-Skin Contact During Feeding yes   Equipment Type/Education   Pump Type Symphony   Breast Pump Type double electric, hospital grade   Breast Pump Flange Type hard   Breast Pump Flange Size 24 mm   Pumping Frequency (times) (after feedings)   Lactation Referrals   Lactation Consult Breastfeeding assessment   Lactation Interventions   Breastfeeding Assistance assisted with positioning;both breasts offered each feeding;electric breast pump used;feeding cue recognition promoted;feeding session observed;infant latch-on verified;infant stimulated to wakeful state;infant suck/swallow verified;milk expression/pumping;support offered   Maternal Breastfeeding Support lactation counseling provided   Latch Promotion positioning assisted;infant moved to breast;suck stimulated with colostrum drop  (sns used)   assisted pt with latch and use of sns at breast. After several latches baby was able to latch to breast and rhythmically suck. " Plan: pt to breastfeed with use of sns. Pt to pump after for breast stimulation. If baby does not latch, pt is to feed bottle of ebm or formula.

## 2018-11-05 ENCOUNTER — TELEPHONE (OUTPATIENT)
Dept: OBSTETRICS AND GYNECOLOGY | Facility: CLINIC | Age: 38
End: 2018-11-05

## 2018-11-05 NOTE — TELEPHONE ENCOUNTER
Dr Ruby pt calling, pt needs to know what her babies blood type is she needs for the pediatrician, Pillo Fernandez.Pt # [phone]

## 2018-11-05 NOTE — TELEPHONE ENCOUNTER
Called and spoke to pt regarding Pillo's blood type, I made sure I had permission to look in babies chart. I let her know what the blood type was, pt voiced understanding.

## 2018-11-09 ENCOUNTER — POSTPARTUM VISIT (OUTPATIENT)
Dept: OBSTETRICS AND GYNECOLOGY | Facility: CLINIC | Age: 38
End: 2018-11-09
Payer: COMMERCIAL

## 2018-11-09 VITALS
DIASTOLIC BLOOD PRESSURE: 86 MMHG | SYSTOLIC BLOOD PRESSURE: 142 MMHG | WEIGHT: 196.19 LBS | HEIGHT: 65 IN | BODY MASS INDEX: 32.69 KG/M2

## 2018-11-09 DIAGNOSIS — I10 HYPERTENSION, UNSPECIFIED TYPE: Primary | ICD-10-CM

## 2018-11-09 DIAGNOSIS — Z01.30 BLOOD PRESSURE CHECK: ICD-10-CM

## 2018-11-09 PROCEDURE — 99213 OFFICE O/P EST LOW 20 MIN: CPT | Mod: S$GLB,,, | Performed by: OBSTETRICS & GYNECOLOGY

## 2018-11-09 PROCEDURE — 99999 PR PBB SHADOW E&M-EST. PATIENT-LVL III: CPT | Mod: PBBFAC,,, | Performed by: OBSTETRICS & GYNECOLOGY

## 2018-11-09 RX ORDER — AMLODIPINE BESYLATE 5 MG/1
5 TABLET ORAL DAILY
Qty: 30 TABLET | Refills: 11 | Status: SHIPPED | OUTPATIENT
Start: 2018-11-09 | End: 2019-11-13 | Stop reason: SDUPTHER

## 2018-11-13 NOTE — PROGRESS NOTES
"CC: bp check    Missy Fernandez is a 38 y.o. female  is one week s/p  induction for severe preEclampsia at 36 wks, not currently on medication.  Not taking home meds.  No c/o, swelling improved, no HA no vision changes.     Past Medical History:   Diagnosis Date    Anxiety     Depression     Severe pre-eclampsia in third trimester 10/30/2018    Tobacco use     Vaginal delivery 10/31/2018     after induction at 36 weeks due to pre-eclampsia with severe features.       Past Surgical History:   Procedure Laterality Date    PELVIC LAPAROSCOPY      Age 15 and Age 25    VAGINAL DELIVERY  , ,        OB History    Para Term  AB Living   3 3 2 1   3   SAB TAB Ectopic Multiple Live Births           3      # Outcome Date GA Lbr Henry/2nd Weight Sex Delivery Anes PTL Lv   3  10/31/18 36w5d / 00:20 2.82 kg (6 lb 3.5 oz) M Vag-Spont EPI Y AMILCAR   2 Term  40w0d  3.629 kg (8 lb) M Vag-Spont   AMILCAR      Birth Comments: pp preEclampsia admit for Magnesium sulfate   1 Term  40w0d  3.09 kg (6 lb 13 oz) F Vag-Spont   AMILCAR      Obstetric Comments   Menarche 14       Family History   Problem Relation Age of Onset    No Known Problems Mother     Heart disease Father     Hypertension Father     No Known Problems Sister     Breast cancer Maternal Grandmother     Kidney disease Maternal Grandfather     Depression Sister     Appendicitis Paternal Grandmother     Ovarian cancer Neg Hx     Colon cancer Neg Hx        Social History     Tobacco Use    Smoking status: Former Smoker     Packs/day: 0.50     Types: Cigarettes     Last attempt to quit: 10/31/2018     Years since quittin.0    Smokeless tobacco: Never Used   Substance Use Topics    Alcohol use: Yes     Comment: rare    Drug use: No       BP (!) 142/86   Ht 5' 5" (1.651 m)   Wt 89 kg (196 lb 3.4 oz)   LMP  (LMP Unknown) Comment: Unsure of Lmp  Breastfeeding? Unknown Comment: Pumping and Bottle   BMI 32.65 kg/m² "     ROS:  GENERAL: Denies weight gain or weight loss. Feeling well overall.   SKIN: Denies rash or lesions.   HEAD: Denies head injury or headache.   NODES: Denies enlarged lymph nodes.   CHEST: Denies chest pain or shortness of breath.   CARDIOVASCULAR: Denies palpitations or left sided chest pain.   ABDOMEN: No abdominal pain, constipation, diarrhea, nausea, vomiting or rectal bleeding.   URINARY: No frequency, dysuria, hematuria, or burning on urination.  REPRODUCTIVE: See HPI.   BREASTS: denies pain, lumps, or nipple discharge.   HEMATOLOGIC: No easy bruisability or excessive bleeding.  MUSCULOSKELETAL: Denies joint pain or swelling.   NEUROLOGIC: Denies syncope or weakness.   PSYCHIATRIC: Denies depression, anxiety or mood swings.    Physical Exam:    APPEARANCE: Well nourished, well developed, in no acute distress.  AFFECT: WNL, alert and oriented x 3  SKIN: No acne or hirsutism  NECK: Neck symmetric without masses or thyromegaly  NODES: No inguinal, cervical, axillary, or femoral lymph node enlargement  CHEST: Good respiratory effect  ABDOMEN: Soft.  No tenderness or masses.  No hepatosplenomegaly.  No hernias.   EXTREMITIES: No edema.    ASSESSMENT AND PLAN    Missy was seen today for postpartum care.    Diagnoses and all orders for this visit:    Hypertension, unspecified type    Blood pressure check    Other orders  -     amLODIPine (NORVASC) 5 MG tablet; Take 1 tablet (5 mg total) by mouth once daily.    start meds, take home bp's if persist call me to increase dose    Follow-up in about 5 weeks (around 12/14/2018).

## 2018-12-14 ENCOUNTER — POSTPARTUM VISIT (OUTPATIENT)
Dept: OBSTETRICS AND GYNECOLOGY | Facility: CLINIC | Age: 38
End: 2018-12-14
Payer: COMMERCIAL

## 2018-12-14 VITALS
WEIGHT: 194 LBS | BODY MASS INDEX: 32.32 KG/M2 | DIASTOLIC BLOOD PRESSURE: 84 MMHG | HEIGHT: 65 IN | SYSTOLIC BLOOD PRESSURE: 138 MMHG

## 2018-12-14 DIAGNOSIS — Z97.5 IUD CONTRACEPTION: ICD-10-CM

## 2018-12-14 DIAGNOSIS — K64.9 HEMORRHOIDS, UNSPECIFIED HEMORRHOID TYPE: ICD-10-CM

## 2018-12-14 PROCEDURE — 99999 PR PBB SHADOW E&M-EST. PATIENT-LVL III: CPT | Mod: PBBFAC,,, | Performed by: OBSTETRICS & GYNECOLOGY

## 2018-12-14 PROCEDURE — 0503F POSTPARTUM CARE VISIT: CPT | Mod: S$GLB,,, | Performed by: OBSTETRICS & GYNECOLOGY

## 2018-12-14 RX ORDER — IBUPROFEN 600 MG/1
600 TABLET ORAL 3 TIMES DAILY
COMMUNITY

## 2018-12-14 NOTE — PROGRESS NOTES
"CC: pp check    Missy Fernandez is a 38 y.o. female  is 6 wk pp, back to smoking.  Had intercourse no problems used condoms.  Worried about recurrence of endo.  Is finished child bearing.  Wants to know which is best.      Past Medical History:   Diagnosis Date    Anxiety     Depression     Severe pre-eclampsia in third trimester 10/30/2018    Tobacco use     Vaginal delivery 10/31/2018     after induction at 36 weeks due to pre-eclampsia with severe features.       Past Surgical History:   Procedure Laterality Date    PELVIC LAPAROSCOPY      Age 15 and Age 25    VAGINAL DELIVERY  , ,        OB History    Para Term  AB Living   3 3 2 1   3   SAB TAB Ectopic Multiple Live Births           3      # Outcome Date GA Lbr Henry/2nd Weight Sex Delivery Anes PTL Lv   3  10/31/18 36w5d / 00:20 2.82 kg (6 lb 3.5 oz) M Vag-Spont EPI Y AMILCAR   2 Term  40w0d  3.629 kg (8 lb) M Vag-Spont   AMILCAR      Birth Comments: pp preEclampsia admit for Magnesium sulfate   1 Term  40w0d  3.09 kg (6 lb 13 oz) F Vag-Spont   AMILCAR      Obstetric Comments   Menarche 14       Family History   Problem Relation Age of Onset    No Known Problems Mother     Heart disease Father     Hypertension Father     No Known Problems Sister     Breast cancer Maternal Grandmother     Kidney disease Maternal Grandfather     Depression Sister     Appendicitis Paternal Grandmother     Ovarian cancer Neg Hx     Colon cancer Neg Hx        Social History     Tobacco Use    Smoking status: Former Smoker     Packs/day: 0.50     Types: Cigarettes     Last attempt to quit: 10/31/2018     Years since quittin.1    Smokeless tobacco: Never Used   Substance Use Topics    Alcohol use: Yes     Comment: rare    Drug use: No       /84   Ht 5' 5" (1.651 m)   Wt 88 kg (194 lb 0.1 oz)   Breastfeeding? No   BMI 32.28 kg/m²     ROS:  GENERAL: Denies weight gain or weight loss. Feeling well overall.   SKIN: " Denies rash or lesions.   HEAD: Denies head injury or headache.   NODES: Denies enlarged lymph nodes.   CHEST: Denies chest pain or shortness of breath.   CARDIOVASCULAR: Denies palpitations or left sided chest pain.   ABDOMEN: No abdominal pain, constipation, diarrhea, nausea, vomiting or rectal bleeding.   URINARY: No frequency, dysuria, hematuria, or burning on urination.  REPRODUCTIVE: See HPI.   BREASTS: denies pain, lumps, or nipple discharge.   HEMATOLOGIC: No easy bruisability or excessive bleeding.  MUSCULOSKELETAL: Denies joint pain or swelling.   NEUROLOGIC: Denies syncope or weakness.   PSYCHIATRIC: Denies depression, anxiety or mood swings.    Physical Exam:    APPEARANCE: Well nourished, well developed, in no acute distress.  AFFECT: WNL, alert and oriented x 3  SKIN: No acne or hirsutism  NECK: Neck symmetric without masses or thyromegaly  NODES: No inguinal, cervical, axillary, or femoral lymph node enlargement  CHEST: Good respiratory effect  ABDOMEN: Soft.  No tenderness or masses.  No hepatosplenomegaly.  No hernias.  PELVIC: Normal external genitalia without lesions.  Normal hair distribution.  Adequate perineal body, normal urethral meatus.  Vagina moist and well rugated without lesions or discharge.  Cervix pink, without lesions, discharge or tenderness.  No significant cystocele or rectocele.  Bimanual exam shows uterus to be normal size, regular, mobile and nontender.  Adnexa without masses or tenderness.    EXTREMITIES: No edema.    ASSESSMENT AND PLAN    Missy was seen today for postpartum care.    Diagnoses and all orders for this visit:    Encounter for postpartum visit    Hemorrhoids, unspecified hemorrhoid type  -     hydrocortisone-pramoxine (PROCTOFOAM-HS) rectal foam; Place 1 applicator rectally 2 (two) times daily.  -     hydrocortisone-pramoxine (PRAMOSONE) 2.5-1 % Lotn lotion; Apply topically 3 (three) times daily.    IUD contraception  -     Device Authorization Order    Other  orders  -     levonorgestrel (MIRENA) 20 mcg/24 hr (5 years) IUD; 1 Intra Uterine Device by Intrauterine route once. for 1 dose        Recommend IUD over sterilization for her.  If IUD fails consider hyst

## 2018-12-18 DIAGNOSIS — K64.9 HEMORRHOIDS, UNSPECIFIED HEMORRHOID TYPE: ICD-10-CM

## 2018-12-19 ENCOUNTER — TELEPHONE (OUTPATIENT)
Dept: PHARMACY | Facility: CLINIC | Age: 38
End: 2018-12-19

## 2018-12-19 NOTE — TELEPHONE ENCOUNTER
LVM to notify pt MIRENA is covered with $0.00 copay on pharmacy benefit. Need to offer pharmacist consultation and obtain consent to deliver to [facility]. Will follow up if no return call is received.

## 2018-12-19 NOTE — TELEPHONE ENCOUNTER
Patient returned call regarding Mirena. Informed of coverage with $0.00 copay on pharmacy benefit. Patient declined pharmacist consultation and provided consent to deliver to [facility]. Patient will call to schedule appointment. Will contact [facility] to coordinate delivery of medication. Patient voiced understanding. CEB

## 2019-01-11 ENCOUNTER — TELEPHONE (OUTPATIENT)
Dept: OBSTETRICS AND GYNECOLOGY | Facility: CLINIC | Age: 39
End: 2019-01-11

## 2019-01-11 DIAGNOSIS — K64.9 HEMORRHOIDS, UNSPECIFIED HEMORRHOID TYPE: ICD-10-CM

## 2019-01-11 RX ORDER — CITALOPRAM 20 MG/1
20 TABLET, FILM COATED ORAL DAILY
Qty: 30 TABLET | Refills: 11 | Status: SHIPPED | OUTPATIENT
Start: 2019-01-11 | End: 2020-01-20 | Stop reason: SDUPTHER

## 2019-01-11 NOTE — TELEPHONE ENCOUNTER
Pt is calling back she really needs Citalopram been off meds for a couple of days and feeling it. Also the rectal cream isn't covered and pharmacy is send over alternative or P.[facility]. yong

## 2019-01-11 NOTE — TELEPHONE ENCOUNTER
Dr Ruby pt calling, and is ready to sched her IUD insertion. Pt hasn't received a call and know that the IUD is ready cause she got a call and waiting. Please call pt to sched insertion. Pt # [phone] Pt needs refill for Citalopram pt is out and rectal cream.

## 2019-01-15 ENCOUNTER — PROCEDURE VISIT (OUTPATIENT)
Dept: OBSTETRICS AND GYNECOLOGY | Facility: CLINIC | Age: 39
End: 2019-01-15
Payer: COMMERCIAL

## 2019-01-15 VITALS
WEIGHT: 196.63 LBS | HEIGHT: 65 IN | DIASTOLIC BLOOD PRESSURE: 84 MMHG | BODY MASS INDEX: 32.76 KG/M2 | SYSTOLIC BLOOD PRESSURE: 130 MMHG

## 2019-01-15 DIAGNOSIS — Z11.3 SCREENING EXAMINATION FOR VENEREAL DISEASE: ICD-10-CM

## 2019-01-15 DIAGNOSIS — K64.9 HEMORRHOIDS, UNSPECIFIED HEMORRHOID TYPE: ICD-10-CM

## 2019-01-15 DIAGNOSIS — Z30.430 ENCOUNTER FOR INSERTION OF MIRENA IUD: Primary | ICD-10-CM

## 2019-01-15 PROCEDURE — 58300 INSERT INTRAUTERINE DEVICE: CPT | Mod: S$GLB,,, | Performed by: OBSTETRICS & GYNECOLOGY

## 2019-01-15 PROCEDURE — 87491 CHLMYD TRACH DNA AMP PROBE: CPT

## 2019-01-15 PROCEDURE — 58300 INSERTION OF IUD-TODAY: ICD-10-PCS | Mod: S$GLB,,, | Performed by: OBSTETRICS & GYNECOLOGY

## 2019-01-15 NOTE — PROCEDURES
Insertion of IUD-Today  Date/Time: 1/15/2019 1:47 PM  Performed by: Keith Ruby MD  Authorized by: Keith Ruby MD     Consent:     Consent obtained:  Written and verbal    Consent given by:  Patient    Procedure risks and benefits discussed: yes      Patient questions answered: yes      Patient agrees, verbalizes understanding, and wants to proceed: yes      Educational handouts given: yes      Instructions and paperwork completed: yes    Procedure:     Pelvic exam performed: yes      Negative GC/chlamydia test: yes      Negative urine pregnancy test: yes      Negative serum pregnancy test: yes      Cervix cleaned and prepped: yes      Speculum placed in vagina: yes      Tenaculum applied to cervix: yes      Uterus sounded: yes      IUD inserted with no complications: yes      IUD type:  Mirena    Strings trimmed: yes    Post-procedure:     Patient tolerated procedure well: yes      Patient will follow up after next period: yes        IUD INSERT    REASON FOR VISIT    Contraceptive management.        CURRENT MEDICATION     Intra-uterine device      TESTS   Laboratory-based Chemistry:  Urine Tests:    An HCG pregnancy test was negative.      ASSESSMENT     Pregnancy test was negative     Insertion of intrauterine device (IUD)       PLAN     Follow-up for re-examination in 6 weeks to assess Mirena strings       THERAPY     Brief operative note free text: Bimanual exam confirmed uterine position.  Vaginal speculum inserted.  Cervix prepped with betadine.  Cervix grasped with tenaculum at 12 o'clock.  Mirena device fitted to sounded depth and inserted without difficulty.   IUD strings cut 3-4 cm from cervical os.  Tenaculum removed.  Tenaculum sites cauterized with nitrous stick.      COUNSELING/EDUCATION     Pre-procedure checklist: informed consent was not obtained Patient was counseled of risks to procedure including but not limited to pain, bleeding, infection, IUD perforation requiring abdominal surgery  for removal, explusion, migration, need for hysteroscopic removal, pregnancy and risk of ectopic. Patient consents and signed Mirena form.    Counseling lasted approximately 15 minutes and all her questions were answered.

## 2019-01-15 NOTE — LETTER
January 15, 2019      [facility]  [address]  [place] [address]  Phone: [phone]  Fax: [phone]       Patient: Missy Fernandez   YOB: 1980  Date of Visit: 01/15/2019    To Whom It May Concern:    Missy Fernandez was at [facility] on 01/15/2019. She may return to work with no restrictions. If you have any questions or concerns, or if I can be of further assistance, please do not hesitate to contact me.    Sincerely,    Keith Ruby MD

## 2019-01-16 LAB
C TRACH DNA SPEC QL NAA+PROBE: NOT DETECTED
N GONORRHOEA DNA SPEC QL NAA+PROBE: NOT DETECTED

## 2019-01-16 RX ORDER — HYDROCORTISONE 25 MG/G
CREAM TOPICAL 2 TIMES DAILY
Qty: 30 G | Refills: 3 | Status: SHIPPED | OUTPATIENT
Start: 2019-01-16 | End: 2020-08-18

## 2019-02-21 ENCOUNTER — OFFICE VISIT (OUTPATIENT)
Dept: OBSTETRICS AND GYNECOLOGY | Facility: CLINIC | Age: 39
End: 2019-02-21
Payer: COMMERCIAL

## 2019-02-21 VITALS — HEIGHT: 65 IN | WEIGHT: 201.06 LBS | BODY MASS INDEX: 33.5 KG/M2

## 2019-02-21 DIAGNOSIS — Z30.431 ENCOUNTER FOR ROUTINE CHECKING OF INTRAUTERINE CONTRACEPTIVE DEVICE (IUD): Primary | ICD-10-CM

## 2019-02-21 DIAGNOSIS — K64.9 HEMORRHOIDS, UNSPECIFIED HEMORRHOID TYPE: ICD-10-CM

## 2019-02-21 PROCEDURE — 99999 PR PBB SHADOW E&M-EST. PATIENT-LVL III: CPT | Mod: PBBFAC,,, | Performed by: NURSE PRACTITIONER

## 2019-02-21 PROCEDURE — 99213 OFFICE O/P EST LOW 20 MIN: CPT | Mod: S$GLB,,, | Performed by: NURSE PRACTITIONER

## 2019-02-21 PROCEDURE — 3008F PR BODY MASS INDEX (BMI) DOCUMENTED: ICD-10-PCS | Mod: CPTII,S$GLB,, | Performed by: NURSE PRACTITIONER

## 2019-02-21 PROCEDURE — 99213 PR OFFICE/OUTPT VISIT, EST, LEVL III, 20-29 MIN: ICD-10-PCS | Mod: S$GLB,,, | Performed by: NURSE PRACTITIONER

## 2019-02-21 PROCEDURE — 3008F BODY MASS INDEX DOCD: CPT | Mod: CPTII,S$GLB,, | Performed by: NURSE PRACTITIONER

## 2019-02-21 PROCEDURE — 99999 PR PBB SHADOW E&M-EST. PATIENT-LVL III: ICD-10-PCS | Mod: PBBFAC,,, | Performed by: NURSE PRACTITIONER

## 2019-03-04 NOTE — PROGRESS NOTES
"Chief Complaint:  IUD check     (Dr. Ruby patient)    Last Pap:  2018       Missy Fernandez is a 38 y.o. female  presents for Mirena IUD check which was inserted on 01/15/2019 per .  Has not been sexually active with her partner since she has been bleeding.  She is still having some heavy bleeding and cramping and is a little frustrated that she is still bleeding; denies fever or discharge. She has not tried to feel the strings.   Also complains that hemorrhoids still uncomfortable despite using Anusol-HC 2.5% BID since mid-January.    ROS:     GENERAL: Denies unintentional weight gain or weight loss. Feeling well overall.   GYN: Denies change in discharge; See HPI.    Physical Exam:     Ht 5' 5" (1.651 m)   Wt 91.2 kg (201 lb 1 oz)   Breastfeeding? No   BMI 33.46 kg/m²   Body mass index is 33.46 kg/m².    APPEARANCE: Well nourished, well developed, in no acute distress.  ABDOMEN: Soft.  No tenderness or masses.    PELVIC: Normal external genitalia without lesions.  Normal hair distribution.  Adequate perineal body, normal urethral meatus.  Vagina moist and well rugated without lesions; + mod amt bright red bloody discharge c/w menses noted.  Cervix pink, without lesions or tenderness; (+) bloody discharge from menses.   IUD strings visible at the cervical os and palpable. No significant cystocele or rectocele.  Bimanual exam shows uterus to be normal size, regular, mobile and nontender.  Adnexa without masses or tenderness.    EXTREMITIES: No edema.     Assessment/Plan:     Encounter for routine checking of intrauterine contraceptive device (IUD)   - If vaginal bleeding is still going on (without break since insertion), and/or is still moderately heavy by month 3, advised pt to call office and inform us.    Hemorrhoids, unspecified hemorrhoid type   - Recommended pt increase water intake, and include stool softeners to her daily regimen, which will help prevent any straining and/or passage of hard " stool.  Reassured that hemorrhoid NOT thrombosed, and will gradually get smaller over time.  Can refer to Gastro if patient desires at some point.    Counseling:     Follow-up if symptoms worsen or fail to improve, for Annual, any other concerns, as needed.    Reviewed the length of IUD efficacy, in this case 5 years.   Reviewed barrier contraception to decrease risk of STD's.   Patient counseled on IUD danger signs and reviewed how to check the strings.

## 2019-11-14 RX ORDER — AMLODIPINE BESYLATE 5 MG/1
5 TABLET ORAL DAILY
Qty: 30 TABLET | Refills: 0 | Status: SHIPPED | OUTPATIENT
Start: 2019-11-14 | End: 2019-12-15 | Stop reason: SDUPTHER

## 2019-12-18 RX ORDER — AMLODIPINE BESYLATE 5 MG/1
TABLET ORAL
Qty: 30 TABLET | Refills: 1 | Status: SHIPPED | OUTPATIENT
Start: 2019-12-18 | End: 2020-08-18

## 2019-12-18 NOTE — TELEPHONE ENCOUNTER
Spoke with pt and informed her that her BP medication has been refilled for 2 months. Pt also told that she needs to establish care with a pcp to monitor her blood pressure. TOMASZ

## 2019-12-18 NOTE — TELEPHONE ENCOUNTER
Please call pt and tell her I refilled her BP medicine (2 months supply), but that I recommend she establish care with a PCP to manage and maintain her BP.  Please offer to schedule an appt or put a referral in with someone in the [facility] system.  Thank you.

## 2020-01-21 RX ORDER — CITALOPRAM 20 MG/1
20 TABLET, FILM COATED ORAL DAILY
Qty: 30 TABLET | Refills: 2 | Status: SHIPPED | OUTPATIENT
Start: 2020-01-21 | End: 2020-04-03

## 2020-04-03 RX ORDER — CITALOPRAM 20 MG/1
TABLET, FILM COATED ORAL
Qty: 30 TABLET | Refills: 2 | Status: SHIPPED | OUTPATIENT
Start: 2020-04-03 | End: 2020-07-08

## 2020-08-18 ENCOUNTER — OFFICE VISIT (OUTPATIENT)
Dept: OBSTETRICS AND GYNECOLOGY | Facility: CLINIC | Age: 40
End: 2020-08-18
Attending: OBSTETRICS & GYNECOLOGY
Payer: COMMERCIAL

## 2020-08-18 VITALS
DIASTOLIC BLOOD PRESSURE: 80 MMHG | WEIGHT: 202.63 LBS | SYSTOLIC BLOOD PRESSURE: 138 MMHG | HEIGHT: 65 IN | BODY MASS INDEX: 33.76 KG/M2

## 2020-08-18 DIAGNOSIS — Z01.419 ENCOUNTER FOR GYNECOLOGICAL EXAMINATION: Primary | ICD-10-CM

## 2020-08-18 DIAGNOSIS — Z32.02 NEGATIVE PREGNANCY TEST: ICD-10-CM

## 2020-08-18 DIAGNOSIS — N92.6 IRREGULAR BLEEDING: ICD-10-CM

## 2020-08-18 LAB
B-HCG UR QL: NEGATIVE
CTP QC/QA: YES

## 2020-08-18 PROCEDURE — 99395 PR PREVENTIVE VISIT,EST,18-39: ICD-10-PCS | Mod: S$GLB,,, | Performed by: OBSTETRICS & GYNECOLOGY

## 2020-08-18 PROCEDURE — 99395 PREV VISIT EST AGE 18-39: CPT | Mod: S$GLB,,, | Performed by: OBSTETRICS & GYNECOLOGY

## 2020-08-18 PROCEDURE — 99999 PR PBB SHADOW E&M-EST. PATIENT-LVL III: ICD-10-PCS | Mod: PBBFAC,,, | Performed by: OBSTETRICS & GYNECOLOGY

## 2020-08-18 PROCEDURE — 99999 PR PBB SHADOW E&M-EST. PATIENT-LVL III: CPT | Mod: PBBFAC,,, | Performed by: OBSTETRICS & GYNECOLOGY

## 2020-08-18 RX ORDER — NYSTATIN AND TRIAMCINOLONE ACETONIDE 100000; 1 [USP'U]/G; MG/G
OINTMENT TOPICAL 2 TIMES DAILY
Qty: 30 G | Refills: 3 | Status: SHIPPED | OUTPATIENT
Start: 2020-08-18 | End: 2021-09-21

## 2020-08-18 NOTE — PROGRESS NOTES
"CC: Well woman exam    Missy Fernandez is a 39 y.o. female  presents for a well woman exam.     Irregular bleeding with IUD and wants strings trimmed because  can feel it.  IUD was inserted a year ago.      Past Medical History:   Diagnosis Date    Anxiety     Depression     Severe pre-eclampsia in third trimester 10/30/2018    Tobacco use     Vaginal delivery 10/31/2018     after induction at 36 weeks due to pre-eclampsia with severe features.       Past Surgical History:   Procedure Laterality Date    PELVIC LAPAROSCOPY      Age 15 and Age 25    VAGINAL DELIVERY  , ,        OB History    Para Term  AB Living   3 3 2 1   3   SAB TAB Ectopic Multiple Live Births           3      # Outcome Date GA Lbr Henry/2nd Weight Sex Delivery Anes PTL Lv   3  10/31/18 36w5d / 00:20 2.82 kg (6 lb 3.5 oz) M Vag-Spont EPI Y AMILCAR   2 Term  40w0d  3.629 kg (8 lb) M Vag-Spont   AMILCAR      Birth Comments: pp preEclampsia admit for Magnesium sulfate   1 Term  40w0d  3.09 kg (6 lb 13 oz) F Vag-Spont   AMILCAR      Obstetric Comments   Menarche 14       Family History   Problem Relation Age of Onset    No Known Problems Mother     Heart disease Father     Hypertension Father     No Known Problems Sister     Breast cancer Maternal Grandmother     Kidney disease Maternal Grandfather     Depression Sister     Appendicitis Paternal Grandmother     Ovarian cancer Neg Hx     Colon cancer Neg Hx        Social History     Tobacco Use    Smoking status: Former Smoker     Packs/day: 0.50     Types: Cigarettes     Quit date: 10/31/2018     Years since quittin.8    Smokeless tobacco: Never Used   Substance Use Topics    Alcohol use: Yes     Comment: rare    Drug use: No       /80   Ht 5' 5" (1.651 m)   Wt 91.9 kg (202 lb 9.6 oz)   Breastfeeding No   BMI 33.71 kg/m²     ROS:  GENERAL: Denies weight gain or weight loss. Feeling well overall.   SKIN: Denies rash or lesions. "   HEAD: Denies head injury or headache.   NODES: Denies enlarged lymph nodes.   CHEST: Denies chest pain or shortness of breath.   CARDIOVASCULAR: Denies palpitations or left sided chest pain.   ABDOMEN: No abdominal pain, constipation, diarrhea, nausea, vomiting or rectal bleeding.   URINARY: No frequency, dysuria, hematuria, or burning on urination.  REPRODUCTIVE: See HPI.   BREASTS: The patient performs breast self-examination and denies pain, lumps, or nipple discharge.   HEMATOLOGIC: No easy bruisability or excessive bleeding.  MUSCULOSKELETAL: Denies joint pain or swelling.   NEUROLOGIC: Denies syncope or weakness.   PSYCHIATRIC: Denies depression, anxiety or mood swings.    Physical Exam:    APPEARANCE: Well nourished, well developed, in no acute distress.  AFFECT: WNL, alert and oriented x 3  SKIN: No acne or hirsutism  NECK: Neck symmetric without masses or thyromegaly  NODES: No inguinal, cervical, axillary, or femoral lymph node enlargement  CHEST: Good respiratory effect  ABDOMEN: Soft.  No tenderness or masses.  No hepatosplenomegaly.  No hernias.  BREASTS: Symmetrical, no skin changes or visible lesions.  No palpable masses, nipple discharge bilaterally.  PELVIC: Normal external genitalia without lesions.  Normal hair distribution.  Adequate perineal body, normal urethral meatus.  Vagina moist and well rugated without lesions or discharge.  Cervix pink, without lesions, discharge or tenderness.  No significant cystocele or rectocele.  Bimanual exam shows uterus to be normal size, regular, mobile and nontender.  Adnexa without masses or tenderness.    EXTREMITIES: No edema.    ASSESSMENT AND PLAN  1. Irregular bleeding  US Pelvis Comp with Transvag NON-OB (xpd       Patient was counseled today on A.C.S. Pap guidelines and recommendations for yearly pelvic exams, mammograms and monthly self breast exams; to see her PCP for other health maintenance.     IUD strings trimmed so not visible.     Follow up in  about 1 year (around 8/18/2021).

## 2020-08-21 ENCOUNTER — TELEPHONE (OUTPATIENT)
Dept: OBSTETRICS AND GYNECOLOGY | Facility: CLINIC | Age: 40
End: 2020-08-21

## 2020-08-21 DIAGNOSIS — Z97.5 CONTRACEPTION, DEVICE INTRAUTERINE: Primary | ICD-10-CM

## 2020-08-25 ENCOUNTER — TELEPHONE (OUTPATIENT)
Dept: OBSTETRICS AND GYNECOLOGY | Facility: CLINIC | Age: 40
End: 2020-08-25

## 2020-08-25 ENCOUNTER — PROCEDURE VISIT (OUTPATIENT)
Dept: OBSTETRICS AND GYNECOLOGY | Facility: CLINIC | Age: 40
End: 2020-08-25
Attending: OBSTETRICS & GYNECOLOGY
Payer: COMMERCIAL

## 2020-08-25 VITALS
DIASTOLIC BLOOD PRESSURE: 70 MMHG | SYSTOLIC BLOOD PRESSURE: 136 MMHG | BODY MASS INDEX: 34.27 KG/M2 | WEIGHT: 205.69 LBS | HEIGHT: 65 IN

## 2020-08-25 DIAGNOSIS — Z30.433 ENCOUNTER FOR IUD REMOVAL AND REINSERTION: Primary | ICD-10-CM

## 2020-08-25 PROCEDURE — 58300 INSERT INTRAUTERINE DEVICE: CPT | Mod: S$GLB,,, | Performed by: OBSTETRICS & GYNECOLOGY

## 2020-08-25 PROCEDURE — 58300 PR INSERT INTRAUTERINE DEVICE: ICD-10-PCS | Mod: S$GLB,,, | Performed by: OBSTETRICS & GYNECOLOGY

## 2020-08-25 PROCEDURE — 58301 PR REMOVE, INTRAUTERINE DEVICE: ICD-10-PCS | Mod: S$GLB,,, | Performed by: OBSTETRICS & GYNECOLOGY

## 2020-08-25 PROCEDURE — 58301 REMOVE INTRAUTERINE DEVICE: CPT | Mod: S$GLB,,, | Performed by: OBSTETRICS & GYNECOLOGY

## 2020-08-25 NOTE — PROCEDURES
Procedures     IUD REMOVAL AND RE-INSERT    REASON FOR VISIT    Contraceptive management.  Previous IUD in lower uterine segment.         CURRENT MEDICATION     Intra-uterine device      TESTS   Laboratory-based Chemistry:  Urine Tests:    An HCG pregnancy test was negative.      ASSESSMENT     Pregnancy test was negative     Removal and Re-Insertion of intrauterine device (IUD)       PLAN     Follow-up for re-examination in 6 weeks to assess Mirena strings       THERAPY     Brief operative note free text: Bimanual exam confirmed uterine position.  Vaginal speculum inserted.  Previous IUD removed.  Cervix prepped with betadine.  Cervix grasped with tenaculum at 12 o'clock.  Mirena device fitted to sounded depth and inserted without difficulty.   IUD strings cut 3-4 cm from cervical os.  Tenaculum removed.  Tenaculum sites cauterized with nitrous stick. All done under ultrasound guidance and IUD was at fundus.       COUNSELING/EDUCATION     Pre-procedure checklist: informed consent was not obtained Patient was counseled of risks to procedure including but not limited to pain, bleeding, infection, IUD perforation requiring abdominal surgery for removal, explusion, migration, need for hysteroscopic removal, pregnancy and risk of ectopic. Patient consents and signed Mirena form.    Counseling lasted approximately 15 minutes and all her questions were answered.

## 2020-08-25 NOTE — TELEPHONE ENCOUNTER
Appt. Today at 345 pm for mirena insertion / Room Zero u/s.  Mirena covered at 100 percent through [employer] and bill.

## 2020-10-06 ENCOUNTER — OFFICE VISIT (OUTPATIENT)
Dept: OBSTETRICS AND GYNECOLOGY | Facility: CLINIC | Age: 40
End: 2020-10-06
Attending: OBSTETRICS & GYNECOLOGY
Payer: COMMERCIAL

## 2020-10-06 VITALS
SYSTOLIC BLOOD PRESSURE: 130 MMHG | DIASTOLIC BLOOD PRESSURE: 86 MMHG | WEIGHT: 206.44 LBS | HEIGHT: 65 IN | BODY MASS INDEX: 34.39 KG/M2

## 2020-10-06 DIAGNOSIS — Z30.431 ENCOUNTER FOR ROUTINE CHECKING OF INTRAUTERINE CONTRACEPTIVE DEVICE (IUD): Primary | ICD-10-CM

## 2020-10-06 PROBLEM — O26.899 RH NEGATIVE STATE IN ANTEPARTUM PERIOD: Status: RESOLVED | Noted: 2018-06-21 | Resolved: 2020-10-06

## 2020-10-06 PROBLEM — O14.10 SEVERE PRE-ECLAMPSIA: Status: RESOLVED | Noted: 2018-10-30 | Resolved: 2020-10-06

## 2020-10-06 PROBLEM — Z67.91 RH NEGATIVE STATE IN ANTEPARTUM PERIOD: Status: RESOLVED | Noted: 2018-06-21 | Resolved: 2020-10-06

## 2020-10-06 PROBLEM — E87.6 HYPOKALEMIA DUE TO LOSS OF POTASSIUM: Status: RESOLVED | Noted: 2018-10-30 | Resolved: 2020-10-06

## 2020-10-06 PROCEDURE — 3008F BODY MASS INDEX DOCD: CPT | Mod: CPTII,S$GLB,, | Performed by: OBSTETRICS & GYNECOLOGY

## 2020-10-06 PROCEDURE — 99213 PR OFFICE/OUTPT VISIT, EST, LEVL III, 20-29 MIN: ICD-10-PCS | Mod: S$GLB,,, | Performed by: OBSTETRICS & GYNECOLOGY

## 2020-10-06 PROCEDURE — 99999 PR PBB SHADOW E&M-EST. PATIENT-LVL III: CPT | Mod: PBBFAC,,, | Performed by: OBSTETRICS & GYNECOLOGY

## 2020-10-06 PROCEDURE — 3008F PR BODY MASS INDEX (BMI) DOCUMENTED: ICD-10-PCS | Mod: CPTII,S$GLB,, | Performed by: OBSTETRICS & GYNECOLOGY

## 2020-10-06 PROCEDURE — 99999 PR PBB SHADOW E&M-EST. PATIENT-LVL III: ICD-10-PCS | Mod: PBBFAC,,, | Performed by: OBSTETRICS & GYNECOLOGY

## 2020-10-06 PROCEDURE — 99213 OFFICE O/P EST LOW 20 MIN: CPT | Mod: S$GLB,,, | Performed by: OBSTETRICS & GYNECOLOGY

## 2020-10-06 RX ORDER — CITALOPRAM 40 MG/1
40 TABLET, FILM COATED ORAL DAILY
Qty: 90 TABLET | Refills: 3 | Status: SHIPPED | OUTPATIENT
Start: 2020-10-06 | End: 2021-09-21 | Stop reason: SDUPTHER

## 2020-10-06 NOTE — PROGRESS NOTES
"CC: IUD check    Missy Fernandez is a 39 y.o. female  is 6 wk post IUD insert.  Had bleeding for two weeks then stopped. No pain.  Much better since last IUD that had to be removed bc was in lower uterine segment. Pt requested IUD strings trimmed so her  could not feel it.      Also wants to increase her celexa to 40mg from 20mg no SI/HI.  Just more stress.      Past Medical History:   Diagnosis Date    Anxiety     Depression     Severe pre-eclampsia in third trimester 10/30/2018    Tobacco use     Vaginal delivery 10/31/2018     after induction at 36 weeks due to pre-eclampsia with severe features.       Past Surgical History:   Procedure Laterality Date    PELVIC LAPAROSCOPY      Age 15 and Age 25    VAGINAL DELIVERY  , ,        OB History    Para Term  AB Living   3 3 2 1   3   SAB TAB Ectopic Multiple Live Births           3      # Outcome Date GA Lbr Henry/2nd Weight Sex Delivery Anes PTL Lv   3  10/31/18 36w5d / 00:20 2.82 kg (6 lb 3.5 oz) M Vag-Spont EPI Y AMILCAR   2 Term  40w0d  3.629 kg (8 lb) M Vag-Spont   AMILCAR      Birth Comments: pp preEclampsia admit for Magnesium sulfate   1 Term  40w0d  3.09 kg (6 lb 13 oz) F Vag-Spont   AMILCAR      Obstetric Comments   Menarche 14       Family History   Problem Relation Age of Onset    No Known Problems Mother     Heart disease Father     Hypertension Father     No Known Problems Sister     Breast cancer Maternal Grandmother     Kidney disease Maternal Grandfather     Depression Sister     Appendicitis Paternal Grandmother     Ovarian cancer Neg Hx     Colon cancer Neg Hx        Social History     Tobacco Use    Smoking status: Former Smoker     Packs/day: 0.50     Types: Cigarettes     Quit date: 10/31/2018     Years since quittin.9    Smokeless tobacco: Never Used   Substance Use Topics    Alcohol use: Yes     Comment: rare    Drug use: No       /86   Ht 5' 5" (1.651 m)   Wt 93.6 kg (206 " lb 7.4 oz)   LMP  (LMP Unknown)   Breastfeeding No   BMI 34.36 kg/m²     ROS:  GENERAL: Denies weight gain or weight loss. Feeling well overall.   SKIN: Denies rash or lesions.   HEAD: Denies head injury or headache.   NODES: Denies enlarged lymph nodes.   CHEST: Denies chest pain or shortness of breath.   CARDIOVASCULAR: Denies palpitations or left sided chest pain.   ABDOMEN: No abdominal pain, constipation, diarrhea, nausea, vomiting or rectal bleeding.   URINARY: No frequency, dysuria, hematuria, or burning on urination.  REPRODUCTIVE: See HPI.   BREASTS: denies pain, lumps, or nipple discharge.   HEMATOLOGIC: No easy bruisability or excessive bleeding.  MUSCULOSKELETAL: Denies joint pain or swelling.   NEUROLOGIC: Denies syncope or weakness.   PSYCHIATRIC: Denies depression, anxiety or mood swings.    Physical Exam:    APPEARANCE: Well nourished, well developed, in no acute distress.  AFFECT: WNL, alert and oriented x 3  SKIN: No acne or hirsutism  NECK: Neck symmetric without masses or thyromegaly  NODES: No inguinal, cervical, axillary, or femoral lymph node enlargement  CHEST: Good respiratory effect  ABDOMEN: Soft.  No tenderness or masses.  No hepatosplenomegaly.  No hernias.  PELVIC: Normal external genitalia without lesions.  Normal hair distribution.  Adequate perineal body, normal urethral meatus.  Vagina moist and well rugated without lesions or discharge.  Cervix pink, without lesions, discharge or tenderness.  No significant cystocele or rectocele.  Bimanual exam shows uterus to be normal size, regular, mobile and nontender.  Adnexa without masses or tenderness.    EXTREMITIES: No edema.    ASSESSMENT AND PLAN    Missy was seen today for iud check.    Diagnoses and all orders for this visit:    Encounter for routine checking of intrauterine contraceptive device (IUD)  -     US Transvaginal Non OB; Future    Other orders  -     citalopram (CELEXA) 40 MG tablet; Take 1 tablet (40 mg total) by mouth  once daily.        Follow up if symptoms worsen or fail to improve.

## 2020-10-20 ENCOUNTER — OFFICE VISIT (OUTPATIENT)
Dept: OBSTETRICS AND GYNECOLOGY | Facility: CLINIC | Age: 40
End: 2020-10-20
Attending: OBSTETRICS & GYNECOLOGY
Payer: COMMERCIAL

## 2020-10-20 ENCOUNTER — TELEPHONE (OUTPATIENT)
Dept: OBSTETRICS AND GYNECOLOGY | Facility: CLINIC | Age: 40
End: 2020-10-20

## 2020-10-20 ENCOUNTER — CLINICAL SUPPORT (OUTPATIENT)
Dept: OBSTETRICS AND GYNECOLOGY | Facility: CLINIC | Age: 40
End: 2020-10-20
Payer: COMMERCIAL

## 2020-10-20 DIAGNOSIS — Z30.9 ENCOUNTER FOR CONTRACEPTIVE MANAGEMENT, UNSPECIFIED TYPE: Primary | ICD-10-CM

## 2020-10-20 DIAGNOSIS — Z30.432 ENCOUNTER FOR IUD REMOVAL: ICD-10-CM

## 2020-10-20 LAB
B-HCG UR QL: NEGATIVE
CTP QC/QA: YES

## 2020-10-20 PROCEDURE — 99213 OFFICE O/P EST LOW 20 MIN: CPT | Mod: 25,S$GLB,, | Performed by: OBSTETRICS & GYNECOLOGY

## 2020-10-20 PROCEDURE — 96372 THER/PROPH/DIAG INJ SC/IM: CPT | Mod: S$GLB,,, | Performed by: OBSTETRICS & GYNECOLOGY

## 2020-10-20 PROCEDURE — 99999 PR PBB SHADOW E&M-EST. PATIENT-LVL II: CPT | Mod: PBBFAC,,, | Performed by: OBSTETRICS & GYNECOLOGY

## 2020-10-20 PROCEDURE — 58301 PR REMOVE, INTRAUTERINE DEVICE: ICD-10-PCS | Mod: S$GLB,,, | Performed by: OBSTETRICS & GYNECOLOGY

## 2020-10-20 PROCEDURE — 99213 PR OFFICE/OUTPT VISIT, EST, LEVL III, 20-29 MIN: ICD-10-PCS | Mod: 25,S$GLB,, | Performed by: OBSTETRICS & GYNECOLOGY

## 2020-10-20 PROCEDURE — 96372 PR INJECTION,THERAP/PROPH/DIAG2ST, IM OR SUBCUT: ICD-10-PCS | Mod: S$GLB,,, | Performed by: OBSTETRICS & GYNECOLOGY

## 2020-10-20 PROCEDURE — 58301 REMOVE INTRAUTERINE DEVICE: CPT | Mod: S$GLB,,, | Performed by: OBSTETRICS & GYNECOLOGY

## 2020-10-20 PROCEDURE — 99999 PR PBB SHADOW E&M-EST. PATIENT-LVL II: ICD-10-PCS | Mod: PBBFAC,,, | Performed by: OBSTETRICS & GYNECOLOGY

## 2020-10-20 RX ORDER — MEDROXYPROGESTERONE ACETATE 150 MG/ML
150 INJECTION, SUSPENSION INTRAMUSCULAR
Qty: 1 ML | Refills: 4 | Status: SHIPPED | OUTPATIENT
Start: 2020-10-20 | End: 2021-09-21 | Stop reason: SDUPTHER

## 2020-10-20 RX ORDER — MEDROXYPROGESTERONE ACETATE 150 MG/ML
150 INJECTION, SUSPENSION INTRAMUSCULAR
Status: COMPLETED | OUTPATIENT
Start: 2020-10-20 | End: 2020-10-20

## 2020-10-20 RX ADMIN — MEDROXYPROGESTERONE ACETATE 150 MG: 150 INJECTION, SUSPENSION INTRAMUSCULAR at 10:10

## 2020-10-20 NOTE — PROGRESS NOTES
Ordering Provider:      In office upt negative      Patient here to receive depo provera to the  LEFT ventrogluteal. Tolerated well, no reaction noted. Instructed to wait 15 minutes after administration for monitoring.      Next due 01/05/21-01/19/21     Pre pain scale: none     Post pain scale: none

## 2020-10-20 NOTE — PROGRESS NOTES
CC: IUD removal     Missy Fernandez is a 40 y.o. female  had u/s done, IUD in lower uterine segment despite being at fundus at time of insert.  2nd time this has happened.     Past Medical History:   Diagnosis Date    Anxiety     Depression     Severe pre-eclampsia in third trimester 10/30/2018    Tobacco use     Vaginal delivery 10/31/2018     after induction at 36 weeks due to pre-eclampsia with severe features.       Past Surgical History:   Procedure Laterality Date    PELVIC LAPAROSCOPY      Age 15 and Age 25    VAGINAL DELIVERY  , ,        OB History    Para Term  AB Living   3 3 2 1   3   SAB TAB Ectopic Multiple Live Births           3      # Outcome Date GA Lbr Henry/2nd Weight Sex Delivery Anes PTL Lv   3  10/31/18 36w5d / 00:20 2.82 kg (6 lb 3.5 oz) M Vag-Spont EPI Y AMILCAR   2 Term  40w0d  3.629 kg (8 lb) M Vag-Spont   AMILCAR      Birth Comments: pp preEclampsia admit for Magnesium sulfate   1 Term  40w0d  3.09 kg (6 lb 13 oz) F Vag-Spont   AMILCAR      Obstetric Comments   Menarche 14       Family History   Problem Relation Age of Onset    No Known Problems Mother     Heart disease Father     Hypertension Father     No Known Problems Sister     Breast cancer Maternal Grandmother     Kidney disease Maternal Grandfather     Depression Sister     Appendicitis Paternal Grandmother     Ovarian cancer Neg Hx     Colon cancer Neg Hx        Social History     Tobacco Use    Smoking status: Former Smoker     Packs/day: 0.50     Types: Cigarettes     Quit date: 10/31/2018     Years since quittin.9    Smokeless tobacco: Never Used   Substance Use Topics    Alcohol use: Yes     Comment: rare    Drug use: No       LMP  (LMP Unknown)     ROS:  GENERAL: Denies weight gain or weight loss. Feeling well overall.   SKIN: Denies rash or lesions.   HEAD: Denies head injury or headache.   NODES: Denies enlarged lymph nodes.   CHEST: Denies chest pain or shortness  of breath.   CARDIOVASCULAR: Denies palpitations or left sided chest pain.   ABDOMEN: No abdominal pain, constipation, diarrhea, nausea, vomiting or rectal bleeding.   URINARY: No frequency, dysuria, hematuria, or burning on urination.  REPRODUCTIVE: See HPI.   BREASTS: denies pain, lumps, or nipple discharge.   HEMATOLOGIC: No easy bruisability or excessive bleeding.  MUSCULOSKELETAL: Denies joint pain or swelling.   NEUROLOGIC: Denies syncope or weakness.   PSYCHIATRIC: Denies depression, anxiety or mood swings.    Physical Exam:    APPEARANCE: Well nourished, well developed, in no acute distress.  AFFECT: WNL, alert and oriented x 3  SKIN: No acne or hirsutism  NECK: Neck symmetric without masses or thyromegaly  NODES: No inguinal, cervical, axillary, or femoral lymph node enlargement  CHEST: Good respiratory effect  ABDOMEN: Soft.  No tenderness or masses.  No hepatosplenomegaly.  No hernias.  PELVIC: Normal external genitalia without lesions.  Normal hair distribution.  Adequate perineal body, normal urethral meatus.  Vagina moist and well rugated without lesions or discharge.  Cervix pink, without lesions, discharge or tenderness.  No significant cystocele or rectocele.  Bimanual exam shows uterus to be normal size, regular, mobile and nontender.  Adnexa without masses or tenderness.    EXTREMITIES: No edema.    ASSESSMENT AND PLAN    Diagnoses and all orders for this visit:    Encounter for contraceptive management, unspecified type  -     POCT urine pregnancy    Encounter for IUD removal    IUD removed, intact and discarded.  upt negative.  Recommend depo.  Pt smokes.     Follow up if symptoms worsen or fail to improve.

## 2020-12-28 ENCOUNTER — PATIENT MESSAGE (OUTPATIENT)
Dept: OBSTETRICS AND GYNECOLOGY | Facility: CLINIC | Age: 40
End: 2020-12-28

## 2020-12-28 NOTE — TELEPHONE ENCOUNTER
Pt c/o of a cyst that appeared over the weekend.  States it is the size of a half dollar, egg shape located on the left side towards the back where leg and vagina meets.  Painful to sit or lay down.  Spouse was able to pop it slightly.  Thinks it may be causing numbness in the leg.    Advised Dr. Ruby is booked out until January.  Scheduled pt 1/21 at 1:15 and added pt to wait list.  Advised to take sitz bath and use epsom salt, and to try taking Motrin for the pain.  Pt verbalized understanding.

## 2020-12-31 ENCOUNTER — OFFICE VISIT (OUTPATIENT)
Dept: OBSTETRICS AND GYNECOLOGY | Facility: CLINIC | Age: 40
End: 2020-12-31
Attending: OBSTETRICS & GYNECOLOGY
Payer: COMMERCIAL

## 2020-12-31 VITALS
SYSTOLIC BLOOD PRESSURE: 128 MMHG | WEIGHT: 205 LBS | HEIGHT: 65 IN | DIASTOLIC BLOOD PRESSURE: 80 MMHG | BODY MASS INDEX: 34.16 KG/M2

## 2020-12-31 DIAGNOSIS — L02.214 ABSCESS OF GROIN, LEFT: Primary | ICD-10-CM

## 2020-12-31 PROCEDURE — 3079F PR MOST RECENT DIASTOLIC BLOOD PRESSURE 80-89 MM HG: ICD-10-PCS | Mod: CPTII,S$GLB,, | Performed by: NURSE PRACTITIONER

## 2020-12-31 PROCEDURE — 3074F PR MOST RECENT SYSTOLIC BLOOD PRESSURE < 130 MM HG: ICD-10-PCS | Mod: CPTII,S$GLB,, | Performed by: NURSE PRACTITIONER

## 2020-12-31 PROCEDURE — 99999 PR PBB SHADOW E&M-EST. PATIENT-LVL III: CPT | Mod: PBBFAC,,, | Performed by: NURSE PRACTITIONER

## 2020-12-31 PROCEDURE — 99999 PR PBB SHADOW E&M-EST. PATIENT-LVL III: ICD-10-PCS | Mod: PBBFAC,,, | Performed by: NURSE PRACTITIONER

## 2020-12-31 PROCEDURE — 99213 PR OFFICE/OUTPT VISIT, EST, LEVL III, 20-29 MIN: ICD-10-PCS | Mod: S$GLB,,, | Performed by: NURSE PRACTITIONER

## 2020-12-31 PROCEDURE — 1125F AMNT PAIN NOTED PAIN PRSNT: CPT | Mod: S$GLB,,, | Performed by: NURSE PRACTITIONER

## 2020-12-31 PROCEDURE — 3079F DIAST BP 80-89 MM HG: CPT | Mod: CPTII,S$GLB,, | Performed by: NURSE PRACTITIONER

## 2020-12-31 PROCEDURE — 3008F PR BODY MASS INDEX (BMI) DOCUMENTED: ICD-10-PCS | Mod: CPTII,S$GLB,, | Performed by: NURSE PRACTITIONER

## 2020-12-31 PROCEDURE — 1125F PR PAIN SEVERITY QUANTIFIED, PAIN PRESENT: ICD-10-PCS | Mod: S$GLB,,, | Performed by: NURSE PRACTITIONER

## 2020-12-31 PROCEDURE — 3008F BODY MASS INDEX DOCD: CPT | Mod: CPTII,S$GLB,, | Performed by: NURSE PRACTITIONER

## 2020-12-31 PROCEDURE — 99213 OFFICE O/P EST LOW 20 MIN: CPT | Mod: S$GLB,,, | Performed by: NURSE PRACTITIONER

## 2020-12-31 PROCEDURE — 3074F SYST BP LT 130 MM HG: CPT | Mod: CPTII,S$GLB,, | Performed by: NURSE PRACTITIONER

## 2020-12-31 RX ORDER — FLUCONAZOLE 150 MG/1
150 TABLET ORAL
Qty: 2 TABLET | Refills: 0 | Status: SHIPPED | OUTPATIENT
Start: 2020-12-31 | End: 2021-01-04

## 2020-12-31 RX ORDER — SULFAMETHOXAZOLE AND TRIMETHOPRIM 800; 160 MG/1; MG/1
1 TABLET ORAL 2 TIMES DAILY
Qty: 20 TABLET | Refills: 0 | Status: SHIPPED | OUTPATIENT
Start: 2020-12-31 | End: 2021-01-10

## 2020-12-31 NOTE — PROGRESS NOTES
History & Physical  Gynecology      SUBJECTIVE:     Chief Complaint: Vaginal Pain       History of Present Illness: Patient presents to [facility] for evaluation of possible left vulvar abscess. Reports painful, swollen, erythremic mass at left groin that occurred Saturday. Reports her  lanced the abscess with his hands on  with significant, purulent drainage. She states the area has decreased in size and discomfort significantly. Denies fever/chills, body aches, n/v, or purulent discharge from site.        Review of patient's allergies indicates:  No Known Allergies    Past Medical History:   Diagnosis Date    Anxiety     Depression     Severe pre-eclampsia in third trimester 10/30/2018    Tobacco use     Vaginal delivery 10/31/2018     after induction at 36 weeks due to pre-eclampsia with severe features.     Past Surgical History:   Procedure Laterality Date    PELVIC LAPAROSCOPY      Age 15 and Age 25    VAGINAL DELIVERY  , ,      OB History        3    Para   3    Term   2       1    AB        Living   3       SAB        TAB        Ectopic        Multiple        Live Births   3           Obstetric Comments   Menarche 14           Family History   Problem Relation Age of Onset    No Known Problems Mother     Heart disease Father     Hypertension Father     No Known Problems Sister     Breast cancer Maternal Grandmother     Kidney disease Maternal Grandfather     Depression Sister     Appendicitis Paternal Grandmother     Ovarian cancer Neg Hx     Colon cancer Neg Hx      Social History     Tobacco Use    Smoking status: Former Smoker     Packs/day: 0.50     Types: Cigarettes     Quit date: 10/31/2018     Years since quittin.1    Smokeless tobacco: Never Used   Substance Use Topics    Alcohol use: Yes     Comment: rare    Drug use: No       Current Outpatient Medications   Medication Sig    citalopram (CELEXA) 40 MG tablet Take 1 tablet (40 mg total) by  mouth once daily.    ibuprofen (ADVIL,MOTRIN) 600 MG tablet Take 600 mg by mouth 3 (three) times daily.    medroxyPROGESTERone (DEPO-PROVERA) 150 mg/mL Syrg Inject 1 mL (150 mg total) into the muscle every 3 (three) months.    fluconazole (DIFLUCAN) 150 MG Tab Take 1 tablet (150 mg total) by mouth Every 3 (three) days. for 2 doses    levonorgestrel (MIRENA) 20 mcg/24 hr (5 years) IUD 1 Intra Uterine Device by Intrauterine route once. for 1 dose    nystatin-triamcinolone (MYCOLOG) ointment Apply topically 2 (two) times daily. for 7 days    sulfamethoxazole-trimethoprim 800-160mg (BACTRIM DS) 800-160 mg Tab Take 1 tablet by mouth 2 (two) times daily. for 10 days     No current facility-administered medications for this visit.          Review of Systems:  Review of Systems   Constitutional: Negative for activity change, appetite change, chills, fatigue and fever.   HENT: Negative for mouth sores.    Eyes: Negative for visual disturbance.   Respiratory: Negative for cough and shortness of breath.    Cardiovascular: Negative for chest pain and leg swelling.   Gastrointestinal: Negative for abdominal pain, constipation, diarrhea, nausea, vomiting and reflux.   Endocrine: Negative for hyperthyroidism and hypothyroidism.   Genitourinary: Negative for dysuria, flank pain, frequency, genital sores, hematuria, menstrual problem, pelvic pain, vaginal bleeding, vaginal discharge, vaginal pain, vaginal dryness and vaginal odor.        Vulvar lesion   Musculoskeletal: Negative for arthralgias, back pain and myalgias.   Integumentary:  Negative for rash, breast mass and breast tenderness.   Neurological: Negative for headaches.   Hematological: Negative for adenopathy. Does not bruise/bleed easily.   Psychiatric/Behavioral: Negative for depression. The patient is not nervous/anxious.    Breast: Negative for asymmetry, mass and tenderness       OBJECTIVE:     Physical Exam:  Physical Exam  Vitals signs and nursing note  reviewed.   Constitutional:       Appearance: Normal appearance.   HENT:      Head: Normocephalic and atraumatic.      Nose: Nose normal.      Mouth/Throat:      Mouth: Mucous membranes are moist.   Eyes:      Conjunctiva/sclera: Conjunctivae normal.   Neck:      Musculoskeletal: Normal range of motion.   Cardiovascular:      Rate and Rhythm: Normal rate.   Pulmonary:      Effort: Pulmonary effort is normal.      Breath sounds: Normal breath sounds.   Abdominal:      Palpations: Abdomen is soft.   Genitourinary:      Musculoskeletal: Normal range of motion.   Skin:     General: Skin is warm and dry.   Neurological:      General: No focal deficit present.      Mental Status: She is alert.   Psychiatric:         Mood and Affect: Mood normal.         Behavior: Behavior normal.         Thought Content: Thought content normal.         Judgment: Judgment normal.           ASSESSMENT:       ICD-10-CM ICD-9-CM    1. Abscess of groin, left  L02.214 682.2 sulfamethoxazole-trimethoprim 800-160mg (BACTRIM DS) 800-160 mg Tab      fluconazole (DIFLUCAN) 150 MG Tab          Plan:      Left groin abscess without fluctuance or surrouding cellulitis, however, deep palpable induration- warm compresses 4 x daily and initiation of Bactrim DS BID x 10 days. FU in 1 week for recheck, instructed to not radha at home and to notify if increasing/worsening for I&D.    Counseling time: 15 minutes      JOSE Briggs

## 2021-01-05 ENCOUNTER — PATIENT MESSAGE (OUTPATIENT)
Dept: OBSTETRICS AND GYNECOLOGY | Facility: CLINIC | Age: 41
End: 2021-01-05

## 2021-01-06 ENCOUNTER — TELEPHONE (OUTPATIENT)
Dept: OBSTETRICS AND GYNECOLOGY | Facility: CLINIC | Age: 41
End: 2021-01-06

## 2021-01-07 ENCOUNTER — CLINICAL SUPPORT (OUTPATIENT)
Dept: OBSTETRICS AND GYNECOLOGY | Facility: CLINIC | Age: 41
End: 2021-01-07
Payer: COMMERCIAL

## 2021-01-07 ENCOUNTER — OFFICE VISIT (OUTPATIENT)
Dept: OBSTETRICS AND GYNECOLOGY | Facility: CLINIC | Age: 41
End: 2021-01-07
Payer: COMMERCIAL

## 2021-01-07 VITALS
WEIGHT: 202.81 LBS | DIASTOLIC BLOOD PRESSURE: 80 MMHG | SYSTOLIC BLOOD PRESSURE: 120 MMHG | HEIGHT: 65 IN | BODY MASS INDEX: 33.79 KG/M2

## 2021-01-07 DIAGNOSIS — L02.214 ABSCESS OF GROIN, LEFT: Primary | ICD-10-CM

## 2021-01-07 DIAGNOSIS — Z30.9 ENCOUNTER FOR CONTRACEPTIVE MANAGEMENT, UNSPECIFIED TYPE: Primary | ICD-10-CM

## 2021-01-07 PROCEDURE — 99213 PR OFFICE/OUTPT VISIT, EST, LEVL III, 20-29 MIN: ICD-10-PCS | Mod: S$GLB,,, | Performed by: NURSE PRACTITIONER

## 2021-01-07 PROCEDURE — 99999 PR PBB SHADOW E&M-EST. PATIENT-LVL III: CPT | Mod: PBBFAC,,, | Performed by: NURSE PRACTITIONER

## 2021-01-07 PROCEDURE — 3008F PR BODY MASS INDEX (BMI) DOCUMENTED: ICD-10-PCS | Mod: CPTII,S$GLB,, | Performed by: NURSE PRACTITIONER

## 2021-01-07 PROCEDURE — 3074F SYST BP LT 130 MM HG: CPT | Mod: CPTII,S$GLB,, | Performed by: NURSE PRACTITIONER

## 2021-01-07 PROCEDURE — 3079F PR MOST RECENT DIASTOLIC BLOOD PRESSURE 80-89 MM HG: ICD-10-PCS | Mod: CPTII,S$GLB,, | Performed by: NURSE PRACTITIONER

## 2021-01-07 PROCEDURE — 3008F BODY MASS INDEX DOCD: CPT | Mod: CPTII,S$GLB,, | Performed by: NURSE PRACTITIONER

## 2021-01-07 PROCEDURE — 99999 PR PBB SHADOW E&M-EST. PATIENT-LVL III: ICD-10-PCS | Mod: PBBFAC,,, | Performed by: NURSE PRACTITIONER

## 2021-01-07 PROCEDURE — 96372 PR INJECTION,THERAP/PROPH/DIAG2ST, IM OR SUBCUT: ICD-10-PCS | Mod: S$GLB,,, | Performed by: OBSTETRICS & GYNECOLOGY

## 2021-01-07 PROCEDURE — 3079F DIAST BP 80-89 MM HG: CPT | Mod: CPTII,S$GLB,, | Performed by: NURSE PRACTITIONER

## 2021-01-07 PROCEDURE — 99213 OFFICE O/P EST LOW 20 MIN: CPT | Mod: S$GLB,,, | Performed by: NURSE PRACTITIONER

## 2021-01-07 PROCEDURE — 3074F PR MOST RECENT SYSTOLIC BLOOD PRESSURE < 130 MM HG: ICD-10-PCS | Mod: CPTII,S$GLB,, | Performed by: NURSE PRACTITIONER

## 2021-01-07 PROCEDURE — 96372 THER/PROPH/DIAG INJ SC/IM: CPT | Mod: S$GLB,,, | Performed by: OBSTETRICS & GYNECOLOGY

## 2021-01-07 RX ORDER — MEDROXYPROGESTERONE ACETATE 150 MG/ML
150 INJECTION, SUSPENSION INTRAMUSCULAR
Status: COMPLETED | OUTPATIENT
Start: 2021-01-07 | End: 2021-01-07

## 2021-01-07 RX ADMIN — MEDROXYPROGESTERONE ACETATE 150 MG: 150 INJECTION, SUSPENSION INTRAMUSCULAR at 09:01

## 2021-03-26 ENCOUNTER — CLINICAL SUPPORT (OUTPATIENT)
Dept: OBSTETRICS AND GYNECOLOGY | Facility: CLINIC | Age: 41
End: 2021-03-26
Payer: COMMERCIAL

## 2021-03-26 DIAGNOSIS — Z30.9 ENCOUNTER FOR CONTRACEPTIVE MANAGEMENT, UNSPECIFIED TYPE: Primary | ICD-10-CM

## 2021-03-26 PROCEDURE — 96372 THER/PROPH/DIAG INJ SC/IM: CPT | Mod: S$GLB,,, | Performed by: OBSTETRICS & GYNECOLOGY

## 2021-03-26 PROCEDURE — 96372 PR INJECTION,THERAP/PROPH/DIAG2ST, IM OR SUBCUT: ICD-10-PCS | Mod: S$GLB,,, | Performed by: OBSTETRICS & GYNECOLOGY

## 2021-03-26 RX ORDER — MEDROXYPROGESTERONE ACETATE 150 MG/ML
150 INJECTION, SUSPENSION INTRAMUSCULAR
Status: COMPLETED | OUTPATIENT
Start: 2021-03-26 | End: 2021-03-26

## 2021-03-26 RX ADMIN — MEDROXYPROGESTERONE ACETATE 150 MG: 150 INJECTION, SUSPENSION INTRAMUSCULAR at 09:03

## 2021-05-04 ENCOUNTER — PATIENT MESSAGE (OUTPATIENT)
Dept: RESEARCH | Facility: HOSPITAL | Age: 41
End: 2021-05-04

## 2021-06-11 ENCOUNTER — TELEPHONE (OUTPATIENT)
Dept: OBSTETRICS AND GYNECOLOGY | Facility: CLINIC | Age: 41
End: 2021-06-11

## 2021-06-11 ENCOUNTER — CLINICAL SUPPORT (OUTPATIENT)
Dept: OBSTETRICS AND GYNECOLOGY | Facility: CLINIC | Age: 41
End: 2021-06-11
Payer: COMMERCIAL

## 2021-06-11 DIAGNOSIS — Z30.42 ENCOUNTER FOR DEPO-PROVERA CONTRACEPTION: Primary | ICD-10-CM

## 2021-06-11 PROCEDURE — 96372 THER/PROPH/DIAG INJ SC/IM: CPT | Mod: S$GLB,,, | Performed by: OBSTETRICS & GYNECOLOGY

## 2021-06-11 PROCEDURE — 96372 PR INJECTION,THERAP/PROPH/DIAG2ST, IM OR SUBCUT: ICD-10-PCS | Mod: S$GLB,,, | Performed by: OBSTETRICS & GYNECOLOGY

## 2021-06-11 RX ORDER — MEDROXYPROGESTERONE ACETATE 150 MG/ML
150 INJECTION, SUSPENSION INTRAMUSCULAR
Status: COMPLETED | OUTPATIENT
Start: 2021-06-11 | End: 2021-06-11

## 2021-06-11 RX ADMIN — MEDROXYPROGESTERONE ACETATE 150 MG: 150 INJECTION, SUSPENSION INTRAMUSCULAR at 02:06

## 2021-08-27 ENCOUNTER — CLINICAL SUPPORT (OUTPATIENT)
Dept: OBSTETRICS AND GYNECOLOGY | Facility: CLINIC | Age: 41
End: 2021-08-27
Payer: COMMERCIAL

## 2021-08-27 DIAGNOSIS — Z30.42 ENCOUNTER FOR DEPO-PROVERA CONTRACEPTION: Primary | ICD-10-CM

## 2021-08-27 RX ORDER — MEDROXYPROGESTERONE ACETATE 150 MG/ML
150 INJECTION, SUSPENSION INTRAMUSCULAR
Status: SHIPPED | OUTPATIENT
Start: 2021-08-27

## 2021-09-21 ENCOUNTER — OFFICE VISIT (OUTPATIENT)
Dept: OBSTETRICS AND GYNECOLOGY | Facility: CLINIC | Age: 41
End: 2021-09-21
Attending: OBSTETRICS & GYNECOLOGY
Payer: COMMERCIAL

## 2021-09-21 VITALS
SYSTOLIC BLOOD PRESSURE: 122 MMHG | BODY MASS INDEX: 36.36 KG/M2 | HEIGHT: 65 IN | DIASTOLIC BLOOD PRESSURE: 86 MMHG | WEIGHT: 218.25 LBS

## 2021-09-21 DIAGNOSIS — Z01.419 ENCOUNTER FOR GYNECOLOGICAL EXAMINATION: Primary | ICD-10-CM

## 2021-09-21 DIAGNOSIS — Z11.51 SCREENING FOR HPV (HUMAN PAPILLOMAVIRUS): ICD-10-CM

## 2021-09-21 DIAGNOSIS — Z12.4 SCREENING FOR CERVICAL CANCER: ICD-10-CM

## 2021-09-21 DIAGNOSIS — Z12.31 ENCOUNTER FOR SCREENING MAMMOGRAM FOR BREAST CANCER: ICD-10-CM

## 2021-09-21 PROCEDURE — 1159F PR MEDICATION LIST DOCUMENTED IN MEDICAL RECORD: ICD-10-PCS | Mod: CPTII,S$GLB,, | Performed by: OBSTETRICS & GYNECOLOGY

## 2021-09-21 PROCEDURE — 99999 PR PBB SHADOW E&M-EST. PATIENT-LVL III: CPT | Mod: PBBFAC,,, | Performed by: OBSTETRICS & GYNECOLOGY

## 2021-09-21 PROCEDURE — 3074F SYST BP LT 130 MM HG: CPT | Mod: CPTII,S$GLB,, | Performed by: OBSTETRICS & GYNECOLOGY

## 2021-09-21 PROCEDURE — 3008F PR BODY MASS INDEX (BMI) DOCUMENTED: ICD-10-PCS | Mod: CPTII,S$GLB,, | Performed by: OBSTETRICS & GYNECOLOGY

## 2021-09-21 PROCEDURE — 1160F RVW MEDS BY RX/DR IN RCRD: CPT | Mod: CPTII,S$GLB,, | Performed by: OBSTETRICS & GYNECOLOGY

## 2021-09-21 PROCEDURE — 3008F BODY MASS INDEX DOCD: CPT | Mod: CPTII,S$GLB,, | Performed by: OBSTETRICS & GYNECOLOGY

## 2021-09-21 PROCEDURE — 99999 PR PBB SHADOW E&M-EST. PATIENT-LVL III: ICD-10-PCS | Mod: PBBFAC,,, | Performed by: OBSTETRICS & GYNECOLOGY

## 2021-09-21 PROCEDURE — 3079F PR MOST RECENT DIASTOLIC BLOOD PRESSURE 80-89 MM HG: ICD-10-PCS | Mod: CPTII,S$GLB,, | Performed by: OBSTETRICS & GYNECOLOGY

## 2021-09-21 PROCEDURE — 99396 PR PREVENTIVE VISIT,EST,40-64: ICD-10-PCS | Mod: S$GLB,,, | Performed by: OBSTETRICS & GYNECOLOGY

## 2021-09-21 PROCEDURE — 87624 HPV HI-RISK TYP POOLED RSLT: CPT | Performed by: OBSTETRICS & GYNECOLOGY

## 2021-09-21 PROCEDURE — 1160F PR REVIEW ALL MEDS BY PRESCRIBER/CLIN PHARMACIST DOCUMENTED: ICD-10-PCS | Mod: CPTII,S$GLB,, | Performed by: OBSTETRICS & GYNECOLOGY

## 2021-09-21 PROCEDURE — 99396 PREV VISIT EST AGE 40-64: CPT | Mod: S$GLB,,, | Performed by: OBSTETRICS & GYNECOLOGY

## 2021-09-21 PROCEDURE — 1159F MED LIST DOCD IN RCRD: CPT | Mod: CPTII,S$GLB,, | Performed by: OBSTETRICS & GYNECOLOGY

## 2021-09-21 PROCEDURE — 3074F PR MOST RECENT SYSTOLIC BLOOD PRESSURE < 130 MM HG: ICD-10-PCS | Mod: CPTII,S$GLB,, | Performed by: OBSTETRICS & GYNECOLOGY

## 2021-09-21 PROCEDURE — 88175 CYTOPATH C/V AUTO FLUID REDO: CPT | Performed by: OBSTETRICS & GYNECOLOGY

## 2021-09-21 PROCEDURE — 3079F DIAST BP 80-89 MM HG: CPT | Mod: CPTII,S$GLB,, | Performed by: OBSTETRICS & GYNECOLOGY

## 2021-09-21 RX ORDER — CITALOPRAM 40 MG/1
40 TABLET, FILM COATED ORAL DAILY
Qty: 90 TABLET | Refills: 3 | Status: SHIPPED | OUTPATIENT
Start: 2021-09-21 | End: 2022-10-04 | Stop reason: SDUPTHER

## 2021-09-21 RX ORDER — MEDROXYPROGESTERONE ACETATE 150 MG/ML
150 INJECTION, SUSPENSION INTRAMUSCULAR
Qty: 1 ML | Refills: 4 | Status: SHIPPED | OUTPATIENT
Start: 2021-09-21 | End: 2022-09-08 | Stop reason: SDUPTHER

## 2021-09-27 LAB
FINAL PATHOLOGIC DIAGNOSIS: NORMAL
HPV HR 12 DNA SPEC QL NAA+PROBE: NEGATIVE
HPV16 AG SPEC QL: NEGATIVE
HPV18 DNA SPEC QL NAA+PROBE: NEGATIVE
Lab: NORMAL

## 2021-09-28 ENCOUNTER — APPOINTMENT (OUTPATIENT)
Dept: RADIOLOGY | Facility: OTHER | Age: 41
End: 2021-09-28
Attending: OBSTETRICS & GYNECOLOGY
Payer: COMMERCIAL

## 2021-09-28 VITALS — HEIGHT: 65 IN | WEIGHT: 218.25 LBS | BODY MASS INDEX: 36.36 KG/M2

## 2021-09-28 DIAGNOSIS — Z12.31 ENCOUNTER FOR SCREENING MAMMOGRAM FOR BREAST CANCER: ICD-10-CM

## 2021-09-28 PROCEDURE — 77067 SCR MAMMO BI INCL CAD: CPT | Mod: TC,PN

## 2021-09-28 PROCEDURE — 77067 MAMMO DIGITAL SCREENING BILAT WITH TOMO: ICD-10-PCS | Mod: 26,,, | Performed by: RADIOLOGY

## 2021-09-28 PROCEDURE — 77063 MAMMO DIGITAL SCREENING BILAT WITH TOMO: ICD-10-PCS | Mod: 26,,, | Performed by: RADIOLOGY

## 2021-09-28 PROCEDURE — 77063 BREAST TOMOSYNTHESIS BI: CPT | Mod: 26,,, | Performed by: RADIOLOGY

## 2021-09-28 PROCEDURE — 77067 SCR MAMMO BI INCL CAD: CPT | Mod: 26,,, | Performed by: RADIOLOGY

## 2021-11-16 ENCOUNTER — CLINICAL SUPPORT (OUTPATIENT)
Dept: OBSTETRICS AND GYNECOLOGY | Facility: CLINIC | Age: 41
End: 2021-11-16
Payer: COMMERCIAL

## 2021-11-16 DIAGNOSIS — Z30.42 ENCOUNTER FOR DEPO-PROVERA CONTRACEPTION: Primary | ICD-10-CM

## 2021-11-16 PROCEDURE — 96372 THER/PROPH/DIAG INJ SC/IM: CPT | Mod: S$GLB,,, | Performed by: OBSTETRICS & GYNECOLOGY

## 2021-11-16 PROCEDURE — 96372 PR INJECTION,THERAP/PROPH/DIAG2ST, IM OR SUBCUT: ICD-10-PCS | Mod: S$GLB,,, | Performed by: OBSTETRICS & GYNECOLOGY

## 2021-11-16 RX ADMIN — MEDROXYPROGESTERONE ACETATE 150 MG: 150 INJECTION, SUSPENSION INTRAMUSCULAR at 09:11

## 2022-01-03 ENCOUNTER — LAB VISIT (OUTPATIENT)
Dept: PRIMARY CARE CLINIC | Facility: OTHER | Age: 42
End: 2022-01-03
Payer: COMMERCIAL

## 2022-01-03 DIAGNOSIS — R05.9 COUGH: ICD-10-CM

## 2022-01-03 PROCEDURE — U0003 INFECTIOUS AGENT DETECTION BY NUCLEIC ACID (DNA OR RNA); SEVERE ACUTE RESPIRATORY SYNDROME CORONAVIRUS 2 (SARS-COV-2) (CORONAVIRUS DISEASE [COVID-19]), AMPLIFIED PROBE TECHNIQUE, MAKING USE OF HIGH THROUGHPUT TECHNOLOGIES AS DESCRIBED BY CMS-2020-01-R: HCPCS | Performed by: FAMILY MEDICINE

## 2022-01-04 ENCOUNTER — PATIENT MESSAGE (OUTPATIENT)
Dept: ADMINISTRATIVE | Facility: OTHER | Age: 42
End: 2022-01-04
Payer: COMMERCIAL

## 2022-01-07 ENCOUNTER — PATIENT MESSAGE (OUTPATIENT)
Dept: ORTHOPEDICS | Facility: CLINIC | Age: 42
End: 2022-01-07
Payer: COMMERCIAL

## 2022-01-07 LAB
SARS-COV-2 RNA RESP QL NAA+PROBE: DETECTED
SARS-COV-2- CYCLE NUMBER: 37

## 2022-02-01 ENCOUNTER — CLINICAL SUPPORT (OUTPATIENT)
Dept: OBSTETRICS AND GYNECOLOGY | Facility: CLINIC | Age: 42
End: 2022-02-01
Payer: COMMERCIAL

## 2022-02-01 DIAGNOSIS — Z30.42 ENCOUNTER FOR DEPO-PROVERA CONTRACEPTION: Primary | ICD-10-CM

## 2022-02-01 PROCEDURE — 96372 PR INJECTION,THERAP/PROPH/DIAG2ST, IM OR SUBCUT: ICD-10-PCS | Mod: S$GLB,,, | Performed by: OBSTETRICS & GYNECOLOGY

## 2022-02-01 PROCEDURE — 99999 PR PBB SHADOW E&M-EST. PATIENT-LVL I: CPT | Mod: PBBFAC,,,

## 2022-02-01 PROCEDURE — 99999 PR PBB SHADOW E&M-EST. PATIENT-LVL I: ICD-10-PCS | Mod: PBBFAC,,,

## 2022-02-01 PROCEDURE — 96372 THER/PROPH/DIAG INJ SC/IM: CPT | Mod: S$GLB,,, | Performed by: OBSTETRICS & GYNECOLOGY

## 2022-02-01 RX ADMIN — MEDROXYPROGESTERONE ACETATE 150 MG: 150 INJECTION, SUSPENSION INTRAMUSCULAR at 09:02

## 2022-02-01 NOTE — PROGRESS NOTES
.2/1/22   UPT not indicated.   Pt administered Medroxyprogesterone 150 mg IM to Left upper outer glut. Pt tolerated well. Pt has been instructed her next injection is due on  4/19/22. Pt verbalizes understanding.       Ordering Provider:Dr Ruby    Pain Before: None    Pain After: None    Patient Complaints: None

## 2022-07-20 ENCOUNTER — CLINICAL SUPPORT (OUTPATIENT)
Dept: OBSTETRICS AND GYNECOLOGY | Facility: CLINIC | Age: 42
End: 2022-07-20
Payer: COMMERCIAL

## 2022-07-20 DIAGNOSIS — Z30.42 ENCOUNTER FOR DEPO-PROVERA CONTRACEPTION: Primary | ICD-10-CM

## 2022-07-20 LAB
B-HCG UR QL: NEGATIVE
CTP QC/QA: YES

## 2022-07-20 PROCEDURE — 99999 PR PBB SHADOW E&M-EST. PATIENT-LVL II: ICD-10-PCS | Mod: PBBFAC,,,

## 2022-07-20 PROCEDURE — 96372 PR INJECTION,THERAP/PROPH/DIAG2ST, IM OR SUBCUT: ICD-10-PCS | Mod: S$GLB,,, | Performed by: OBSTETRICS & GYNECOLOGY

## 2022-07-20 PROCEDURE — 81025 POCT URINE PREGNANCY: ICD-10-PCS | Mod: S$GLB,,, | Performed by: OBSTETRICS & GYNECOLOGY

## 2022-07-20 PROCEDURE — 99999 PR PBB SHADOW E&M-EST. PATIENT-LVL II: CPT | Mod: PBBFAC,,,

## 2022-07-20 PROCEDURE — 81025 URINE PREGNANCY TEST: CPT | Mod: S$GLB,,, | Performed by: OBSTETRICS & GYNECOLOGY

## 2022-07-20 PROCEDURE — 96372 THER/PROPH/DIAG INJ SC/IM: CPT | Mod: S$GLB,,, | Performed by: OBSTETRICS & GYNECOLOGY

## 2022-07-20 RX ADMIN — MEDROXYPROGESTERONE ACETATE 150 MG: 150 INJECTION, SUSPENSION INTRAMUSCULAR at 10:07

## 2022-07-20 NOTE — PROGRESS NOTES
.7/20/22   UPT Negative  Pt administered Medroxyprogesterone 150 mg IM to left upper outer glut. Pt tolerated well. Pt has been instructed her next injection is due on  10/5/22 - 10/19/22. Pt verbalizes understanding.       Ordering Provider:Dr Eastman    Pain Before: None    Pain After: None    Patient Complaints: none

## 2022-09-08 DIAGNOSIS — Z01.419 ENCOUNTER FOR GYNECOLOGICAL EXAMINATION: ICD-10-CM

## 2022-09-08 RX ORDER — MEDROXYPROGESTERONE ACETATE 150 MG/ML
150 INJECTION, SUSPENSION INTRAMUSCULAR
Qty: 1 ML | Refills: 0 | Status: SHIPPED | OUTPATIENT
Start: 2022-09-08 | End: 2022-12-28 | Stop reason: SDUPTHER

## 2022-10-04 DIAGNOSIS — Z01.419 ENCOUNTER FOR GYNECOLOGICAL EXAMINATION: ICD-10-CM

## 2022-10-04 RX ORDER — CITALOPRAM 40 MG/1
40 TABLET, FILM COATED ORAL DAILY
Qty: 90 TABLET | Refills: 3 | Status: SHIPPED | OUTPATIENT
Start: 2022-10-04 | End: 2023-10-09 | Stop reason: SDUPTHER

## 2022-10-05 ENCOUNTER — CLINICAL SUPPORT (OUTPATIENT)
Dept: OBSTETRICS AND GYNECOLOGY | Facility: CLINIC | Age: 42
End: 2022-10-05
Payer: COMMERCIAL

## 2022-10-05 DIAGNOSIS — Z30.42 ENCOUNTER FOR DEPO-PROVERA CONTRACEPTION: Primary | ICD-10-CM

## 2022-10-05 PROCEDURE — 99999 PR PBB SHADOW E&M-EST. PATIENT-LVL I: CPT | Mod: PBBFAC,,,

## 2022-10-05 PROCEDURE — 99999 PR PBB SHADOW E&M-EST. PATIENT-LVL I: ICD-10-PCS | Mod: PBBFAC,,,

## 2022-10-05 PROCEDURE — 96372 PR INJECTION,THERAP/PROPH/DIAG2ST, IM OR SUBCUT: ICD-10-PCS | Mod: S$GLB,,, | Performed by: OBSTETRICS & GYNECOLOGY

## 2022-10-05 PROCEDURE — 96372 THER/PROPH/DIAG INJ SC/IM: CPT | Mod: S$GLB,,, | Performed by: OBSTETRICS & GYNECOLOGY

## 2022-10-05 RX ADMIN — MEDROXYPROGESTERONE ACETATE 150 MG: 150 INJECTION, SUSPENSION INTRAMUSCULAR at 03:10

## 2022-10-05 NOTE — PROGRESS NOTES
.10/5/22   UPT not indicated  Pt administered Medroxyprogesterone 150 mg IM to Right upper outer glut. Pt tolerated well. Pt has been instructed her next injection is due on  12/28/22. Pt verbalizes understanding.       Ordering Provider:Dr Eastman    Pain Before: None    Pain After: None    Patient Complaints: none

## 2022-12-28 DIAGNOSIS — Z01.419 ENCOUNTER FOR GYNECOLOGICAL EXAMINATION: ICD-10-CM

## 2022-12-28 RX ORDER — MEDROXYPROGESTERONE ACETATE 150 MG/ML
150 INJECTION, SUSPENSION INTRAMUSCULAR
Qty: 1 ML | Refills: 0 | Status: SHIPPED | OUTPATIENT
Start: 2022-12-28 | End: 2023-03-23

## 2023-01-20 NOTE — PROGRESS NOTES
"OBSTETRICS AND GYNECOLOGY      Chief Complaint:  Well Woman Exam     HPI:      Missy Fernandez is a 42 y.o.  who presents today for well woman exam.  LMP: No LMP recorded.  Patient denies abnormal vaginal bleeding, abnormal discharge/odor, pelvic pain, or dysuria/hematuria.  Ms. Fernandez is currently sexually active with a single male partner. She is currently using Depo-Provera for contraception. She declines STD screening today. Endorses recurrence of vaginal bumps. Started within this last month. Unsure of triggering factor. History of prior episodes several years ago, was told due to baby powder use, but had discontinued use.  Sometimes the lesions pop on their own, and self resolve. Blood and pus exudate when they pop. She has not tried to express the lesions herself. Unsure of exact diagnosis. Never told was HS. No new partners. No new hobbies.  No new wipes, products. Stress level improving due to job change. Did a one day monistat last week after abx course. She denies additional acute issues, problems, or complaints.    Gardasil: Pt Unsure   Discussed is a candidate if interested and can get a quote from insurance as 3 injection series; patient to let us know.    Ms. Fernandez confirms that she wears her seatbelt when riding in the car and does not text while driving.     OB History          4    Para   4    Term   3       1    AB        Living   3         SAB        IAB        Ectopic        Multiple        Live Births   3           Obstetric Comments   Menarche 14             ROS:     GENERAL: Feeling well overall.   SKIN: Denies new rash.   CARDIOVASCULAR: Denies chest pain.   RESP: Denies shortness of breath.  BREASTS: Denies lumps or nipple discharge.   URINARY: Denies dysuria, hematuria.  NEUROLOGIC: Denies syncope, falls.     Physical Exam:      PHYSICAL EXAM:  BP (!) 130/98 (BP Location: Left arm, Patient Position: Sitting, BP Method: Medium (Manual))   Ht 5' 5" (1.651 m)   Wt 103.6 " kg (228 lb 6.3 oz)   BMI 38.01 kg/m²   Body mass index is 38.01 kg/m².     APPEARANCE:  Well nourished, well developed, in no acute distress.  Able to smile appropriately during our encounter. Makes eye contact. Pleasant.  PSYCH: Appropriate mood and affect.  SKIN:   No acne or hirsutism.  CARDIOVASCULAR:  No edema of peripheral extremities. Well perfused throughout.  RESP:  No accessory muscle use to breathe. Speaking comfortably in complete sentences.   BREASTS:  Symmetrical, with no visible skin lesions or scars, no palpable masses. No nipple discharge or peau d'orange bilaterally. No palpable axillary LAD.  ABDOMEN:  Soft.   PELVIC:  Normal external genitalia. Vaginal bump as described above is a small possibly epidermal inclusion cyst or vulvar cyst, located on superior aspect of left labia minora, inferior-lateral to clitoral ramos. Oval shaped, about 0.6cm in length. Soft, mobile, not firm, slightly tender with manipulation. No surrounding skin changes, no warmth, induration, or erythema. Anticipate sebum filled. Normal hair distribution. Adequate perineal body, normal urethral meatus. Vagina moist and well rugated. Without lesions, without discharge. Cervix 3x4cm, parous. Cervix pink, without ectocervical lesions, discharge or tenderness.  No ectropion. No significant cystocele or rectocele.  Bimanual exam shows uterus to be normal size, regular, mobile and nontender.  Adnexa without masses or tenderness.      Assessment/Plan:     Problem List Items Addressed This Visit    None  Visit Diagnoses       Encounter for well woman exam    -  Primary        -- Counseled patient regarding healthy diet and regular exercise, daily multivitamin, daily seat belt use.   -- Discussed weight, ideal BMI <= 25, nutrition consult discussed, declines today.  -- PCP referral per patient request, also for BP (denies vision changes, CP)  -- She denies abuse and feels safe at home.  -- Immunizations:  flu not obtained this  season  -- Pap smear (9/2021):  NILM, HPV(-) (up to date, next due 9/2024)  -- Contraception:  DMPA  -- STD screening:  declines   -- MMG (9/2021):  BiRADS Category 1, TC risk 18.45% (next due - order today)  -- Reviewed options for vulvar lesion as above, rec heat application/warm bath to the area to help resolution; discussed warning/infection signs for need to return to clinic, if fails to resolve can I&D, patient to let me know  -- Previous patient of Dr. Ruby    Follow up in about 1 year (around 1/31/2024) for WWE.    Counseling:     Patient was counseled today on current ASCCP pap guidelines, the recommendation for yearly physical exams, safe driving habits, breast self awareness and annual mammograms. She is to see her PCP for other health maintenance.     Use of the [facility] Patient Portal discussed and encouraged during today's visit.                 As of April 1, 2021, the Cures Act has been passed nationally. This new law requires that all doctors progress notes, lab results, pathology reports and radiology reports be released IMMEDIATELY to the patient in the patient portal. That means that the results are released to you at the EXACT same time they are released to me. Therefore, with all of the patients that I have I am not able to reply to each patient exactly when the results come in. So there will be a delay from when you see the results to when I see them and have time to come up with a response to send you. Also I only see these results when I am on the computer at work. So if the results come in over the weekend or after 5 pm of a work day, I will not see them until the next business day. As you can tell, this is a challenge as a physician to give every patient the quick response they hope for and deserve. So please be patient!   Thanks for your understanding and patience.

## 2023-01-31 ENCOUNTER — OFFICE VISIT (OUTPATIENT)
Dept: OBSTETRICS AND GYNECOLOGY | Facility: CLINIC | Age: 43
End: 2023-01-31
Payer: COMMERCIAL

## 2023-01-31 VITALS
HEIGHT: 65 IN | BODY MASS INDEX: 38.05 KG/M2 | DIASTOLIC BLOOD PRESSURE: 98 MMHG | SYSTOLIC BLOOD PRESSURE: 130 MMHG | WEIGHT: 228.38 LBS

## 2023-01-31 DIAGNOSIS — Z01.419 ENCOUNTER FOR WELL WOMAN EXAM: Primary | ICD-10-CM

## 2023-01-31 DIAGNOSIS — Z12.39 BREAST SCREENING: ICD-10-CM

## 2023-01-31 PROCEDURE — 99396 PR PREVENTIVE VISIT,EST,40-64: ICD-10-PCS | Mod: S$GLB,,, | Performed by: STUDENT IN AN ORGANIZED HEALTH CARE EDUCATION/TRAINING PROGRAM

## 2023-01-31 PROCEDURE — 3080F PR MOST RECENT DIASTOLIC BLOOD PRESSURE >= 90 MM HG: ICD-10-PCS | Mod: CPTII,S$GLB,, | Performed by: STUDENT IN AN ORGANIZED HEALTH CARE EDUCATION/TRAINING PROGRAM

## 2023-01-31 PROCEDURE — 3075F SYST BP GE 130 - 139MM HG: CPT | Mod: CPTII,S$GLB,, | Performed by: STUDENT IN AN ORGANIZED HEALTH CARE EDUCATION/TRAINING PROGRAM

## 2023-01-31 PROCEDURE — 3008F BODY MASS INDEX DOCD: CPT | Mod: CPTII,S$GLB,, | Performed by: STUDENT IN AN ORGANIZED HEALTH CARE EDUCATION/TRAINING PROGRAM

## 2023-01-31 PROCEDURE — 1159F PR MEDICATION LIST DOCUMENTED IN MEDICAL RECORD: ICD-10-PCS | Mod: CPTII,S$GLB,, | Performed by: STUDENT IN AN ORGANIZED HEALTH CARE EDUCATION/TRAINING PROGRAM

## 2023-01-31 PROCEDURE — 1160F RVW MEDS BY RX/DR IN RCRD: CPT | Mod: CPTII,S$GLB,, | Performed by: STUDENT IN AN ORGANIZED HEALTH CARE EDUCATION/TRAINING PROGRAM

## 2023-01-31 PROCEDURE — 3008F PR BODY MASS INDEX (BMI) DOCUMENTED: ICD-10-PCS | Mod: CPTII,S$GLB,, | Performed by: STUDENT IN AN ORGANIZED HEALTH CARE EDUCATION/TRAINING PROGRAM

## 2023-01-31 PROCEDURE — 3080F DIAST BP >= 90 MM HG: CPT | Mod: CPTII,S$GLB,, | Performed by: STUDENT IN AN ORGANIZED HEALTH CARE EDUCATION/TRAINING PROGRAM

## 2023-01-31 PROCEDURE — 99396 PREV VISIT EST AGE 40-64: CPT | Mod: S$GLB,,, | Performed by: STUDENT IN AN ORGANIZED HEALTH CARE EDUCATION/TRAINING PROGRAM

## 2023-01-31 PROCEDURE — 99999 PR PBB SHADOW E&M-EST. PATIENT-LVL IV: ICD-10-PCS | Mod: PBBFAC,,, | Performed by: STUDENT IN AN ORGANIZED HEALTH CARE EDUCATION/TRAINING PROGRAM

## 2023-01-31 PROCEDURE — 1159F MED LIST DOCD IN RCRD: CPT | Mod: CPTII,S$GLB,, | Performed by: STUDENT IN AN ORGANIZED HEALTH CARE EDUCATION/TRAINING PROGRAM

## 2023-01-31 PROCEDURE — 99999 PR PBB SHADOW E&M-EST. PATIENT-LVL IV: CPT | Mod: PBBFAC,,, | Performed by: STUDENT IN AN ORGANIZED HEALTH CARE EDUCATION/TRAINING PROGRAM

## 2023-01-31 PROCEDURE — 1160F PR REVIEW ALL MEDS BY PRESCRIBER/CLIN PHARMACIST DOCUMENTED: ICD-10-PCS | Mod: CPTII,S$GLB,, | Performed by: STUDENT IN AN ORGANIZED HEALTH CARE EDUCATION/TRAINING PROGRAM

## 2023-01-31 PROCEDURE — 3075F PR MOST RECENT SYSTOLIC BLOOD PRESS GE 130-139MM HG: ICD-10-PCS | Mod: CPTII,S$GLB,, | Performed by: STUDENT IN AN ORGANIZED HEALTH CARE EDUCATION/TRAINING PROGRAM

## 2023-02-24 ENCOUNTER — APPOINTMENT (OUTPATIENT)
Dept: RADIOLOGY | Facility: OTHER | Age: 43
End: 2023-02-24
Attending: STUDENT IN AN ORGANIZED HEALTH CARE EDUCATION/TRAINING PROGRAM
Payer: COMMERCIAL

## 2023-02-24 VITALS — HEIGHT: 65 IN | WEIGHT: 228.38 LBS | BODY MASS INDEX: 38.05 KG/M2

## 2023-02-24 DIAGNOSIS — Z12.31 VISIT FOR SCREENING MAMMOGRAM: ICD-10-CM

## 2023-02-24 DIAGNOSIS — Z12.39 BREAST SCREENING: ICD-10-CM

## 2023-02-24 PROCEDURE — 77067 SCR MAMMO BI INCL CAD: CPT | Mod: 26,,, | Performed by: RADIOLOGY

## 2023-02-24 PROCEDURE — 77067 SCR MAMMO BI INCL CAD: CPT | Mod: TC,PN

## 2023-02-24 PROCEDURE — 77063 MAMMO DIGITAL SCREENING BILAT WITH TOMO: ICD-10-PCS | Mod: 26,,, | Performed by: RADIOLOGY

## 2023-02-24 PROCEDURE — 77063 BREAST TOMOSYNTHESIS BI: CPT | Mod: 26,,, | Performed by: RADIOLOGY

## 2023-02-24 PROCEDURE — 77067 MAMMO DIGITAL SCREENING BILAT WITH TOMO: ICD-10-PCS | Mod: 26,,, | Performed by: RADIOLOGY

## 2023-05-23 ENCOUNTER — OFFICE VISIT (OUTPATIENT)
Dept: PRIMARY CARE CLINIC | Facility: CLINIC | Age: 43
End: 2023-05-23
Payer: COMMERCIAL

## 2023-05-23 VITALS
BODY MASS INDEX: 39.84 KG/M2 | DIASTOLIC BLOOD PRESSURE: 90 MMHG | RESPIRATION RATE: 17 BRPM | HEART RATE: 96 BPM | WEIGHT: 239.44 LBS | OXYGEN SATURATION: 98 % | SYSTOLIC BLOOD PRESSURE: 148 MMHG

## 2023-05-23 DIAGNOSIS — R63.5 WEIGHT GAIN: ICD-10-CM

## 2023-05-23 DIAGNOSIS — Z11.4 SCREENING FOR HIV (HUMAN IMMUNODEFICIENCY VIRUS): ICD-10-CM

## 2023-05-23 DIAGNOSIS — Z13.6 ENCOUNTER FOR LIPID SCREENING FOR CARDIOVASCULAR DISEASE: ICD-10-CM

## 2023-05-23 DIAGNOSIS — Z72.0 TOBACCO USE: ICD-10-CM

## 2023-05-23 DIAGNOSIS — Z83.3 FAMILY HISTORY OF DIABETES MELLITUS: ICD-10-CM

## 2023-05-23 DIAGNOSIS — Z11.59 ENCOUNTER FOR HEPATITIS C SCREENING TEST FOR LOW RISK PATIENT: ICD-10-CM

## 2023-05-23 DIAGNOSIS — R20.2 NUMBNESS AND TINGLING: ICD-10-CM

## 2023-05-23 DIAGNOSIS — Z13.220 ENCOUNTER FOR LIPID SCREENING FOR CARDIOVASCULAR DISEASE: ICD-10-CM

## 2023-05-23 DIAGNOSIS — R53.83 FATIGUE, UNSPECIFIED TYPE: ICD-10-CM

## 2023-05-23 DIAGNOSIS — R20.0 NUMBNESS AND TINGLING: ICD-10-CM

## 2023-05-23 DIAGNOSIS — Z13.1 SCREENING FOR DIABETES MELLITUS: ICD-10-CM

## 2023-05-23 DIAGNOSIS — Z00.00 ENCOUNTER FOR MEDICAL EXAMINATION TO ESTABLISH CARE: Primary | ICD-10-CM

## 2023-05-23 DIAGNOSIS — F41.1 GENERALIZED ANXIETY DISORDER: ICD-10-CM

## 2023-05-23 DIAGNOSIS — R03.0 ELEVATED BLOOD PRESSURE READING WITHOUT DIAGNOSIS OF HYPERTENSION: ICD-10-CM

## 2023-05-23 PROCEDURE — 3080F PR MOST RECENT DIASTOLIC BLOOD PRESSURE >= 90 MM HG: ICD-10-PCS | Mod: CPTII,S$GLB,, | Performed by: NURSE PRACTITIONER

## 2023-05-23 PROCEDURE — 1159F PR MEDICATION LIST DOCUMENTED IN MEDICAL RECORD: ICD-10-PCS | Mod: CPTII,S$GLB,, | Performed by: NURSE PRACTITIONER

## 2023-05-23 PROCEDURE — 3008F BODY MASS INDEX DOCD: CPT | Mod: CPTII,S$GLB,, | Performed by: NURSE PRACTITIONER

## 2023-05-23 PROCEDURE — 99999 PR PBB SHADOW E&M-EST. PATIENT-LVL III: ICD-10-PCS | Mod: PBBFAC,,, | Performed by: NURSE PRACTITIONER

## 2023-05-23 PROCEDURE — 99999 PR PBB SHADOW E&M-EST. PATIENT-LVL III: CPT | Mod: PBBFAC,,, | Performed by: NURSE PRACTITIONER

## 2023-05-23 PROCEDURE — 1160F RVW MEDS BY RX/DR IN RCRD: CPT | Mod: CPTII,S$GLB,, | Performed by: NURSE PRACTITIONER

## 2023-05-23 PROCEDURE — 1159F MED LIST DOCD IN RCRD: CPT | Mod: CPTII,S$GLB,, | Performed by: NURSE PRACTITIONER

## 2023-05-23 PROCEDURE — 3077F PR MOST RECENT SYSTOLIC BLOOD PRESSURE >= 140 MM HG: ICD-10-PCS | Mod: CPTII,S$GLB,, | Performed by: NURSE PRACTITIONER

## 2023-05-23 PROCEDURE — 3080F DIAST BP >= 90 MM HG: CPT | Mod: CPTII,S$GLB,, | Performed by: NURSE PRACTITIONER

## 2023-05-23 PROCEDURE — 3044F PR MOST RECENT HEMOGLOBIN A1C LEVEL <7.0%: ICD-10-PCS | Mod: CPTII,S$GLB,, | Performed by: NURSE PRACTITIONER

## 2023-05-23 PROCEDURE — 99396 PR PREVENTIVE VISIT,EST,40-64: ICD-10-PCS | Mod: S$GLB,,, | Performed by: NURSE PRACTITIONER

## 2023-05-23 PROCEDURE — 3044F HG A1C LEVEL LT 7.0%: CPT | Mod: CPTII,S$GLB,, | Performed by: NURSE PRACTITIONER

## 2023-05-23 PROCEDURE — 3008F PR BODY MASS INDEX (BMI) DOCUMENTED: ICD-10-PCS | Mod: CPTII,S$GLB,, | Performed by: NURSE PRACTITIONER

## 2023-05-23 PROCEDURE — 99396 PREV VISIT EST AGE 40-64: CPT | Mod: S$GLB,,, | Performed by: NURSE PRACTITIONER

## 2023-05-23 PROCEDURE — 1160F PR REVIEW ALL MEDS BY PRESCRIBER/CLIN PHARMACIST DOCUMENTED: ICD-10-PCS | Mod: CPTII,S$GLB,, | Performed by: NURSE PRACTITIONER

## 2023-05-23 PROCEDURE — 3077F SYST BP >= 140 MM HG: CPT | Mod: CPTII,S$GLB,, | Performed by: NURSE PRACTITIONER

## 2023-05-23 RX ORDER — HYDROCHLOROTHIAZIDE 12.5 MG/1
12.5 TABLET ORAL DAILY
Qty: 30 TABLET | Refills: 0 | Status: SHIPPED | OUTPATIENT
Start: 2023-05-23 | End: 2023-06-22

## 2023-05-23 NOTE — PROGRESS NOTES
[facility] Note    Chief Complaint      Chief Complaint   Patient presents with    Headache    Numbness     Right arm mainly but left arm started this week a little.     Swelling     Arms, ankles, and fingers x couple of months      History of Present Illness      Missy Fernandez is a 42 y.o. female patient with chronic conditions of anxiety, vapes who is new to me and presents today for est care, c/o HA, numbness and tingling to RUE. Feeling swelling to arms and legs for the past few months, weight gain.     Comes to apt alone  Practices safety measures such as wearing her seatbelt    Labs ordered  Eye exams  Gyn- Dr. Madrid  Mammogram-2023    Vaccines:  Covid- pfizer x 2  Tdap-2018      Health Maintenance   Topic Date Due    Mammogram  2024    TETANUS VACCINE  2028    Hepatitis C Screening  Completed    Lipid Panel  Completed       Past Medical History:   Diagnosis Date    Anxiety     Depression     Severe pre-eclampsia in third trimester 10/30/2018    Tobacco use     Vaginal delivery 10/31/2018     after induction at 36 weeks due to pre-eclampsia with severe features.       Past Surgical History:   Procedure Laterality Date    PELVIC LAPAROSCOPY      Age 15 and Age 25    VAGINAL DELIVERY  , ,        family history includes Appendicitis in her paternal grandmother; Breast cancer in her maternal grandmother; Depression in her sister; Diabetes in her father; Heart disease in her father; Hypertension in her father; Kidney disease in her maternal grandfather; No Known Problems in her mother and sister.    Social History     Tobacco Use    Smoking status: Every Day     Types: Vaping with nicotine    Smokeless tobacco: Never   Substance Use Topics    Alcohol use: Not Currently     Comment: rare    Drug use: No       Review of Systems   Constitutional:  Negative for chills and fever.   HENT:  Negative for congestion, sinus pain and sore throat.    Eyes:  Negative for blurred  vision.   Respiratory:  Negative for cough, shortness of breath and wheezing.    Cardiovascular:  Negative for chest pain, palpitations and leg swelling.   Gastrointestinal:  Negative for abdominal pain, constipation, diarrhea, nausea and vomiting.   Genitourinary:  Negative for dysuria.   Musculoskeletal:  Negative for myalgias.   Skin:  Negative for rash.   Neurological:  Positive for tingling and headaches. Negative for dizziness and weakness.   Psychiatric/Behavioral:  Negative for depression. The patient is not nervous/anxious.       Outpatient Encounter Medications as of 5/23/2023   Medication Sig Dispense Refill    citalopram (CELEXA) 40 MG tablet Take 1 tablet (40 mg total) by mouth once daily. 90 tablet 3    ibuprofen (ADVIL,MOTRIN) 600 MG tablet Take 600 mg by mouth 3 (three) times daily.      medroxyPROGESTERone (DEPO-PROVERA) 150 mg/mL Syrg INJECT 1 ML INTO THE MUSCLE EVERY 3 MONTHS 1 mL 3    hydroCHLOROthiazide (HYDRODIURIL) 12.5 MG Tab Take 1 tablet (12.5 mg total) by mouth once daily. 30 tablet 0     Facility-Administered Encounter Medications as of 5/23/2023   Medication Dose Route Frequency Provider Last Rate Last Admin    medroxyPROGESTERone (DEPO-PROVERA) injection 150 mg  150 mg Intramuscular Q90 Days Keith Ruby MD   150 mg at 10/05/22 1552        Review of patient's allergies indicates:  No Known Allergies    Physical Exam      Vital Signs  Pulse: 96  Resp: 17  SpO2: 98 %  BP: (!) 148/90  BP Location: Right arm  Patient Position: Sitting  Height and Weight  Weight: 108.6 kg (239 lb 6.7 oz)    Physical Exam  Vitals and nursing note reviewed.   Constitutional:       Appearance: Normal appearance. She is well-developed. She is obese.   HENT:      Head: Normocephalic and atraumatic.      Right Ear: External ear normal.      Left Ear: External ear normal.   Eyes:      Conjunctiva/sclera: Conjunctivae normal.      Pupils: Pupils are equal, round, and reactive to light.   Neck:      Thyroid: No  thyromegaly.      Vascular: No JVD.      Trachea: No tracheal deviation.   Cardiovascular:      Rate and Rhythm: Normal rate and regular rhythm.      Heart sounds: Normal heart sounds. No murmur heard.  Pulmonary:      Effort: Pulmonary effort is normal.      Breath sounds: Normal breath sounds.   Chest:      Chest wall: No tenderness.   Abdominal:      General: Bowel sounds are normal.      Palpations: Abdomen is soft.      Tenderness: There is no abdominal tenderness. There is no guarding.   Musculoskeletal:         General: Normal range of motion.      Cervical back: Normal range of motion and neck supple.   Lymphadenopathy:      Cervical: No cervical adenopathy.   Skin:     General: Skin is warm and dry.   Neurological:      Mental Status: She is alert and oriented to person, place, and time.   Psychiatric:         Behavior: Behavior normal.         Thought Content: Thought content normal.         Judgment: Judgment normal.        Laboratory:  CBC:  Lab Results   Component Value Date    WBC 6.10 05/24/2023    RBC 4.73 05/24/2023    HGB 14.0 05/24/2023    HCT 44.3 05/24/2023     05/24/2023    MCV 94 05/24/2023    MCH 29.6 05/24/2023    MCHC 31.6 (L) 05/24/2023    MCHC 32.2 11/01/2018    MCHC 32.6 10/30/2018     CMP:  Lab Results   Component Value Date    GLU 92 05/24/2023    CALCIUM 9.2 05/24/2023    ALBUMIN 4.0 05/24/2023    PROT 7.4 05/24/2023     05/24/2023    K 4.4 05/24/2023    CO2 23 05/24/2023     05/24/2023    BUN 12 05/24/2023    ALKPHOS 89 05/24/2023    ALT 15 05/24/2023    AST 14 05/24/2023    BILITOT 0.4 05/24/2023    BILITOT 0.3 10/30/2018    BILITOT 0.3 10/26/2018     URINALYSIS:  Lab Results   Component Value Date    COLORU YELLOW 06/07/2004    SPECGRAV 1.019 06/07/2004    PHUR 6.0 06/07/2004    PROTEINUA NEG 06/07/2004    NITRITE NEG 06/07/2004    LEUKOCYTESUR NEG 06/07/2004    UROBILINOGEN 1 06/07/2004      LIPIDS:  Lab Results   Component Value Date    TSH 1.250 05/24/2023     TSH 1.201 06/15/2018    HDL 38 (L) 05/24/2023    CHOL 186 05/24/2023    TRIG 123 05/24/2023    LDLCALC 123.4 05/24/2023    CHOLHDL 20.4 05/24/2023    NONHDLCHOL 148 05/24/2023    TOTALCHOLEST 4.9 05/24/2023     TSH:  Lab Results   Component Value Date    TSH 1.250 05/24/2023    TSH 1.201 06/15/2018     A1C:  Lab Results   Component Value Date    HGBA1C 5.6 05/24/2023         Assessment/Plan     Missy Fernandez is a 42 y.o.female with:    Encounter for medical examination to establish care  -     CBC Auto Differential; Future; Expected date: 05/23/2023  -     Comprehensive Metabolic Panel; Future; Expected date: 05/23/2023  -     Hemoglobin A1C; Future; Expected date: 05/23/2023  -     Hepatitis C Antibody; Future; Expected date: 05/23/2023  -     HIV 1/2 Ag/Ab (4th Gen); Future; Expected date: 05/23/2023  -     Lipid Panel; Future; Expected date: 05/23/2023  -     T4, Free; Future; Expected date: 05/23/2023  -     TSH; Future; Expected date: 05/23/2023  -     Vitamin B12; Future; Expected date: 05/23/2023    Weight gain  -     CBC Auto Differential; Future; Expected date: 05/23/2023  -     Comprehensive Metabolic Panel; Future; Expected date: 05/23/2023  -     Hemoglobin A1C; Future; Expected date: 05/23/2023  -     Hepatitis C Antibody; Future; Expected date: 05/23/2023  -     HIV 1/2 Ag/Ab (4th Gen); Future; Expected date: 05/23/2023  -     Lipid Panel; Future; Expected date: 05/23/2023  -     T4, Free; Future; Expected date: 05/23/2023  -     TSH; Future; Expected date: 05/23/2023  -     Vitamin B12; Future; Expected date: 05/23/2023    Numbness and tingling  -     CBC Auto Differential; Future; Expected date: 05/23/2023  -     Comprehensive Metabolic Panel; Future; Expected date: 05/23/2023  -     Hemoglobin A1C; Future; Expected date: 05/23/2023  -     Hepatitis C Antibody; Future; Expected date: 05/23/2023  -     HIV 1/2 Ag/Ab (4th Gen); Future; Expected date: 05/23/2023  -     Lipid Panel; Future; Expected date:  05/23/2023  -     T4, Free; Future; Expected date: 05/23/2023  -     TSH; Future; Expected date: 05/23/2023  -     Vitamin B12; Future; Expected date: 05/23/2023    Generalized anxiety disorder  -     CBC Auto Differential; Future; Expected date: 05/23/2023  -     Comprehensive Metabolic Panel; Future; Expected date: 05/23/2023  -     Hemoglobin A1C; Future; Expected date: 05/23/2023  -     Hepatitis C Antibody; Future; Expected date: 05/23/2023  -     HIV 1/2 Ag/Ab (4th Gen); Future; Expected date: 05/23/2023  -     Lipid Panel; Future; Expected date: 05/23/2023  -     T4, Free; Future; Expected date: 05/23/2023  -     TSH; Future; Expected date: 05/23/2023  -     Vitamin B12; Future; Expected date: 05/23/2023    Tobacco use  -     CBC Auto Differential; Future; Expected date: 05/23/2023  -     Comprehensive Metabolic Panel; Future; Expected date: 05/23/2023  -     Hemoglobin A1C; Future; Expected date: 05/23/2023  -     Hepatitis C Antibody; Future; Expected date: 05/23/2023  -     HIV 1/2 Ag/Ab (4th Gen); Future; Expected date: 05/23/2023  -     Lipid Panel; Future; Expected date: 05/23/2023  -     T4, Free; Future; Expected date: 05/23/2023  -     TSH; Future; Expected date: 05/23/2023  -     Vitamin B12; Future; Expected date: 05/23/2023    Elevated blood pressure reading without diagnosis of hypertension  -     hydroCHLOROthiazide (HYDRODIURIL) 12.5 MG Tab; Take 1 tablet (12.5 mg total) by mouth once daily.  Dispense: 30 tablet; Refill: 0    Fatigue, unspecified type  -     CBC Auto Differential; Future; Expected date: 05/23/2023  -     Comprehensive Metabolic Panel; Future; Expected date: 05/23/2023  -     Hemoglobin A1C; Future; Expected date: 05/23/2023  -     Hepatitis C Antibody; Future; Expected date: 05/23/2023  -     HIV 1/2 Ag/Ab (4th Gen); Future; Expected date: 05/23/2023  -     Lipid Panel; Future; Expected date: 05/23/2023  -     T4, Free; Future; Expected date: 05/23/2023  -     TSH; Future;  Expected date: 05/23/2023  -     Vitamin B12; Future; Expected date: 05/23/2023    Family history of diabetes mellitus  -     CBC Auto Differential; Future; Expected date: 05/23/2023  -     Comprehensive Metabolic Panel; Future; Expected date: 05/23/2023  -     Hemoglobin A1C; Future; Expected date: 05/23/2023  -     Hepatitis C Antibody; Future; Expected date: 05/23/2023  -     HIV 1/2 Ag/Ab (4th Gen); Future; Expected date: 05/23/2023  -     Lipid Panel; Future; Expected date: 05/23/2023  -     T4, Free; Future; Expected date: 05/23/2023  -     TSH; Future; Expected date: 05/23/2023  -     Vitamin B12; Future; Expected date: 05/23/2023    Screening for diabetes mellitus  -     CBC Auto Differential; Future; Expected date: 05/23/2023  -     Comprehensive Metabolic Panel; Future; Expected date: 05/23/2023  -     Hemoglobin A1C; Future; Expected date: 05/23/2023  -     Hepatitis C Antibody; Future; Expected date: 05/23/2023  -     HIV 1/2 Ag/Ab (4th Gen); Future; Expected date: 05/23/2023  -     Lipid Panel; Future; Expected date: 05/23/2023  -     T4, Free; Future; Expected date: 05/23/2023  -     TSH; Future; Expected date: 05/23/2023  -     Vitamin B12; Future; Expected date: 05/23/2023    Encounter for hepatitis C screening test for low risk patient  -     CBC Auto Differential; Future; Expected date: 05/23/2023  -     Comprehensive Metabolic Panel; Future; Expected date: 05/23/2023  -     Hemoglobin A1C; Future; Expected date: 05/23/2023  -     Hepatitis C Antibody; Future; Expected date: 05/23/2023  -     HIV 1/2 Ag/Ab (4th Gen); Future; Expected date: 05/23/2023  -     Lipid Panel; Future; Expected date: 05/23/2023  -     T4, Free; Future; Expected date: 05/23/2023  -     TSH; Future; Expected date: 05/23/2023  -     Vitamin B12; Future; Expected date: 05/23/2023    Screening for HIV (human immunodeficiency virus)  -     CBC Auto Differential; Future; Expected date: 05/23/2023  -     Comprehensive Metabolic  Panel; Future; Expected date: 05/23/2023  -     Hemoglobin A1C; Future; Expected date: 05/23/2023  -     Hepatitis C Antibody; Future; Expected date: 05/23/2023  -     HIV 1/2 Ag/Ab (4th Gen); Future; Expected date: 05/23/2023  -     Lipid Panel; Future; Expected date: 05/23/2023  -     T4, Free; Future; Expected date: 05/23/2023  -     TSH; Future; Expected date: 05/23/2023  -     Vitamin B12; Future; Expected date: 05/23/2023    Encounter for lipid screening for cardiovascular disease  -     CBC Auto Differential; Future; Expected date: 05/23/2023  -     Comprehensive Metabolic Panel; Future; Expected date: 05/23/2023  -     Hemoglobin A1C; Future; Expected date: 05/23/2023  -     Hepatitis C Antibody; Future; Expected date: 05/23/2023  -     HIV 1/2 Ag/Ab (4th Gen); Future; Expected date: 05/23/2023  -     Lipid Panel; Future; Expected date: 05/23/2023  -     T4, Free; Future; Expected date: 05/23/2023  -     TSH; Future; Expected date: 05/23/2023  -     Vitamin B12; Future; Expected date: 05/23/2023          Health Maintenance Due   Topic Date Due    Pneumococcal Vaccines (Age 0-64) (1 - PCV) Never done    COVID-19 Vaccine (3 - Booster for Pfizer series) 11/16/2021        I spent 34 minutes on the day of this encounter for preparing for, evaluating, treating, and managing this patient.      -Continue current medications and maintain follow up with specialists.  Return to clinic in Follow up in about 1 year (around 5/23/2024), or if symptoms worsen or fail to improve.       ANT McfarlandC  [facility]

## 2023-05-24 ENCOUNTER — LAB VISIT (OUTPATIENT)
Dept: LAB | Facility: HOSPITAL | Age: 43
End: 2023-05-24
Attending: NURSE PRACTITIONER
Payer: COMMERCIAL

## 2023-05-24 DIAGNOSIS — Z11.59 ENCOUNTER FOR HEPATITIS C SCREENING TEST FOR LOW RISK PATIENT: ICD-10-CM

## 2023-05-24 DIAGNOSIS — R20.2 NUMBNESS AND TINGLING: ICD-10-CM

## 2023-05-24 DIAGNOSIS — Z83.3 FAMILY HISTORY OF DIABETES MELLITUS: ICD-10-CM

## 2023-05-24 DIAGNOSIS — Z11.4 SCREENING FOR HIV (HUMAN IMMUNODEFICIENCY VIRUS): ICD-10-CM

## 2023-05-24 DIAGNOSIS — Z13.1 SCREENING FOR DIABETES MELLITUS: ICD-10-CM

## 2023-05-24 DIAGNOSIS — F41.1 GENERALIZED ANXIETY DISORDER: ICD-10-CM

## 2023-05-24 DIAGNOSIS — Z13.6 ENCOUNTER FOR LIPID SCREENING FOR CARDIOVASCULAR DISEASE: ICD-10-CM

## 2023-05-24 DIAGNOSIS — Z72.0 TOBACCO USE: ICD-10-CM

## 2023-05-24 DIAGNOSIS — Z00.00 ENCOUNTER FOR MEDICAL EXAMINATION TO ESTABLISH CARE: ICD-10-CM

## 2023-05-24 DIAGNOSIS — R63.5 WEIGHT GAIN: ICD-10-CM

## 2023-05-24 DIAGNOSIS — R53.83 FATIGUE, UNSPECIFIED TYPE: ICD-10-CM

## 2023-05-24 DIAGNOSIS — Z13.220 ENCOUNTER FOR LIPID SCREENING FOR CARDIOVASCULAR DISEASE: ICD-10-CM

## 2023-05-24 DIAGNOSIS — R20.0 NUMBNESS AND TINGLING: ICD-10-CM

## 2023-05-24 LAB
ALBUMIN SERPL BCP-MCNC: 4 G/DL (ref 3.5–5.2)
ALP SERPL-CCNC: 89 U/L (ref 55–135)
ALT SERPL W/O P-5'-P-CCNC: 15 U/L (ref 10–44)
ANION GAP SERPL CALC-SCNC: 12 MMOL/L (ref 8–16)
AST SERPL-CCNC: 14 U/L (ref 10–40)
BASOPHILS # BLD AUTO: 0.05 K/UL (ref 0–0.2)
BASOPHILS NFR BLD: 0.8 % (ref 0–1.9)
BILIRUB SERPL-MCNC: 0.4 MG/DL (ref 0.1–1)
BUN SERPL-MCNC: 12 MG/DL (ref 6–20)
CALCIUM SERPL-MCNC: 9.2 MG/DL (ref 8.7–10.5)
CHLORIDE SERPL-SCNC: 105 MMOL/L (ref 95–110)
CHOLEST SERPL-MCNC: 186 MG/DL (ref 120–199)
CHOLEST/HDLC SERPL: 4.9 {RATIO} (ref 2–5)
CO2 SERPL-SCNC: 23 MMOL/L (ref 23–29)
CREAT SERPL-MCNC: 1 MG/DL (ref 0.5–1.4)
DIFFERENTIAL METHOD: ABNORMAL
EOSINOPHIL # BLD AUTO: 0.1 K/UL (ref 0–0.5)
EOSINOPHIL NFR BLD: 1.5 % (ref 0–8)
ERYTHROCYTE [DISTWIDTH] IN BLOOD BY AUTOMATED COUNT: 13.3 % (ref 11.5–14.5)
EST. GFR  (NO RACE VARIABLE): >60 ML/MIN/1.73 M^2
ESTIMATED AVG GLUCOSE: 114 MG/DL (ref 68–131)
GLUCOSE SERPL-MCNC: 92 MG/DL (ref 70–110)
HBA1C MFR BLD: 5.6 % (ref 4–5.6)
HCT VFR BLD AUTO: 44.3 % (ref 37–48.5)
HCV AB SERPL QL IA: NORMAL
HDLC SERPL-MCNC: 38 MG/DL (ref 40–75)
HDLC SERPL: 20.4 % (ref 20–50)
HGB BLD-MCNC: 14 G/DL (ref 12–16)
HIV 1+2 AB+HIV1 P24 AG SERPL QL IA: NORMAL
IMM GRANULOCYTES # BLD AUTO: 0.02 K/UL (ref 0–0.04)
IMM GRANULOCYTES NFR BLD AUTO: 0.3 % (ref 0–0.5)
LDLC SERPL CALC-MCNC: 123.4 MG/DL (ref 63–159)
LYMPHOCYTES # BLD AUTO: 1.8 K/UL (ref 1–4.8)
LYMPHOCYTES NFR BLD: 29.8 % (ref 18–48)
MCH RBC QN AUTO: 29.6 PG (ref 27–31)
MCHC RBC AUTO-ENTMCNC: 31.6 G/DL (ref 32–36)
MCV RBC AUTO: 94 FL (ref 82–98)
MONOCYTES # BLD AUTO: 0.5 K/UL (ref 0.3–1)
MONOCYTES NFR BLD: 8 % (ref 4–15)
NEUTROPHILS # BLD AUTO: 3.6 K/UL (ref 1.8–7.7)
NEUTROPHILS NFR BLD: 59.6 % (ref 38–73)
NONHDLC SERPL-MCNC: 148 MG/DL
NRBC BLD-RTO: 0 /100 WBC
PLATELET # BLD AUTO: 219 K/UL (ref 150–450)
PMV BLD AUTO: 11.1 FL (ref 9.2–12.9)
POTASSIUM SERPL-SCNC: 4.4 MMOL/L (ref 3.5–5.1)
PROT SERPL-MCNC: 7.4 G/DL (ref 6–8.4)
RBC # BLD AUTO: 4.73 M/UL (ref 4–5.4)
SODIUM SERPL-SCNC: 140 MMOL/L (ref 136–145)
T4 FREE SERPL-MCNC: 0.86 NG/DL (ref 0.71–1.51)
TRIGL SERPL-MCNC: 123 MG/DL (ref 30–150)
TSH SERPL DL<=0.005 MIU/L-ACNC: 1.25 UIU/ML (ref 0.4–4)
VIT B12 SERPL-MCNC: 439 PG/ML (ref 210–950)
WBC # BLD AUTO: 6.1 K/UL (ref 3.9–12.7)

## 2023-05-24 PROCEDURE — 82607 VITAMIN B-12: CPT | Performed by: NURSE PRACTITIONER

## 2023-05-24 PROCEDURE — 80061 LIPID PANEL: CPT | Performed by: NURSE PRACTITIONER

## 2023-05-24 PROCEDURE — 84439 ASSAY OF FREE THYROXINE: CPT | Performed by: NURSE PRACTITIONER

## 2023-05-24 PROCEDURE — 83036 HEMOGLOBIN GLYCOSYLATED A1C: CPT | Performed by: NURSE PRACTITIONER

## 2023-05-24 PROCEDURE — 36415 COLL VENOUS BLD VENIPUNCTURE: CPT | Performed by: NURSE PRACTITIONER

## 2023-05-24 PROCEDURE — 80053 COMPREHEN METABOLIC PANEL: CPT | Performed by: NURSE PRACTITIONER

## 2023-05-24 PROCEDURE — 84443 ASSAY THYROID STIM HORMONE: CPT | Performed by: NURSE PRACTITIONER

## 2023-05-24 PROCEDURE — 85025 COMPLETE CBC W/AUTO DIFF WBC: CPT | Performed by: NURSE PRACTITIONER

## 2023-05-24 PROCEDURE — 87389 HIV-1 AG W/HIV-1&-2 AB AG IA: CPT | Performed by: NURSE PRACTITIONER

## 2023-05-24 PROCEDURE — 86803 HEPATITIS C AB TEST: CPT | Performed by: NURSE PRACTITIONER

## 2023-06-07 ENCOUNTER — OFFICE VISIT (OUTPATIENT)
Dept: PRIMARY CARE CLINIC | Facility: CLINIC | Age: 43
End: 2023-06-07
Payer: COMMERCIAL

## 2023-06-07 VITALS
WEIGHT: 237.44 LBS | HEART RATE: 100 BPM | SYSTOLIC BLOOD PRESSURE: 138 MMHG | DIASTOLIC BLOOD PRESSURE: 90 MMHG | BODY MASS INDEX: 39.51 KG/M2 | RESPIRATION RATE: 16 BRPM

## 2023-06-07 DIAGNOSIS — I10 PRIMARY HYPERTENSION: Primary | ICD-10-CM

## 2023-06-07 DIAGNOSIS — R63.5 WEIGHT GAIN: ICD-10-CM

## 2023-06-07 DIAGNOSIS — E66.01 SEVERE OBESITY (BMI 35.0-39.9) WITH COMORBIDITY: ICD-10-CM

## 2023-06-07 DIAGNOSIS — G43.709 CHRONIC MIGRAINE WITHOUT AURA WITHOUT STATUS MIGRAINOSUS, NOT INTRACTABLE: ICD-10-CM

## 2023-06-07 PROCEDURE — 3044F PR MOST RECENT HEMOGLOBIN A1C LEVEL <7.0%: ICD-10-PCS | Mod: CPTII,S$GLB,, | Performed by: NURSE PRACTITIONER

## 2023-06-07 PROCEDURE — 4010F PR ACE/ARB THEARPY RXD/TAKEN: ICD-10-PCS | Mod: CPTII,S$GLB,, | Performed by: NURSE PRACTITIONER

## 2023-06-07 PROCEDURE — 3008F BODY MASS INDEX DOCD: CPT | Mod: CPTII,S$GLB,, | Performed by: NURSE PRACTITIONER

## 2023-06-07 PROCEDURE — 3075F PR MOST RECENT SYSTOLIC BLOOD PRESS GE 130-139MM HG: ICD-10-PCS | Mod: CPTII,S$GLB,, | Performed by: NURSE PRACTITIONER

## 2023-06-07 PROCEDURE — 3075F SYST BP GE 130 - 139MM HG: CPT | Mod: CPTII,S$GLB,, | Performed by: NURSE PRACTITIONER

## 2023-06-07 PROCEDURE — 3080F DIAST BP >= 90 MM HG: CPT | Mod: CPTII,S$GLB,, | Performed by: NURSE PRACTITIONER

## 2023-06-07 PROCEDURE — 1160F RVW MEDS BY RX/DR IN RCRD: CPT | Mod: CPTII,S$GLB,, | Performed by: NURSE PRACTITIONER

## 2023-06-07 PROCEDURE — 3044F HG A1C LEVEL LT 7.0%: CPT | Mod: CPTII,S$GLB,, | Performed by: NURSE PRACTITIONER

## 2023-06-07 PROCEDURE — 1160F PR REVIEW ALL MEDS BY PRESCRIBER/CLIN PHARMACIST DOCUMENTED: ICD-10-PCS | Mod: CPTII,S$GLB,, | Performed by: NURSE PRACTITIONER

## 2023-06-07 PROCEDURE — 4010F ACE/ARB THERAPY RXD/TAKEN: CPT | Mod: CPTII,S$GLB,, | Performed by: NURSE PRACTITIONER

## 2023-06-07 PROCEDURE — 3008F PR BODY MASS INDEX (BMI) DOCUMENTED: ICD-10-PCS | Mod: CPTII,S$GLB,, | Performed by: NURSE PRACTITIONER

## 2023-06-07 PROCEDURE — 1159F MED LIST DOCD IN RCRD: CPT | Mod: CPTII,S$GLB,, | Performed by: NURSE PRACTITIONER

## 2023-06-07 PROCEDURE — 99999 PR PBB SHADOW E&M-EST. PATIENT-LVL III: ICD-10-PCS | Mod: PBBFAC,,, | Performed by: NURSE PRACTITIONER

## 2023-06-07 PROCEDURE — 99214 OFFICE O/P EST MOD 30 MIN: CPT | Mod: S$GLB,,, | Performed by: NURSE PRACTITIONER

## 2023-06-07 PROCEDURE — 1159F PR MEDICATION LIST DOCUMENTED IN MEDICAL RECORD: ICD-10-PCS | Mod: CPTII,S$GLB,, | Performed by: NURSE PRACTITIONER

## 2023-06-07 PROCEDURE — 3080F PR MOST RECENT DIASTOLIC BLOOD PRESSURE >= 90 MM HG: ICD-10-PCS | Mod: CPTII,S$GLB,, | Performed by: NURSE PRACTITIONER

## 2023-06-07 PROCEDURE — 99999 PR PBB SHADOW E&M-EST. PATIENT-LVL III: CPT | Mod: PBBFAC,,, | Performed by: NURSE PRACTITIONER

## 2023-06-07 PROCEDURE — 99214 PR OFFICE/OUTPT VISIT, EST, LEVL IV, 30-39 MIN: ICD-10-PCS | Mod: S$GLB,,, | Performed by: NURSE PRACTITIONER

## 2023-06-07 RX ORDER — BUTALBITAL, ACETAMINOPHEN AND CAFFEINE 50; 325; 40 MG/1; MG/1; MG/1
1 TABLET ORAL EVERY 4 HOURS PRN
Qty: 30 TABLET | Refills: 0 | Status: SHIPPED | OUTPATIENT
Start: 2023-06-07 | End: 2023-07-07

## 2023-06-07 RX ORDER — LOSARTAN POTASSIUM 25 MG/1
25 TABLET ORAL DAILY
Qty: 90 TABLET | Refills: 3 | Status: SHIPPED | OUTPATIENT
Start: 2023-06-07 | End: 2024-06-06

## 2023-06-07 NOTE — PROGRESS NOTES
[facility] Note    Chief Complaint      Chief Complaint   Patient presents with    Follow-up     History of Present Illness      Missy Fernandez is a 42 y.o. female patient who presents today for 2 week f/u with BP, was started on HCTZ, has numbness and tingling to arms and legs with swelling. Has gotten a little better, /90 on HCTZ, plan to add losartan for now.   Pt has been having no luck with losing weight, her migraines have been worse.       Plan to add losartan 25mg to HCTZ 12.5mg,   Start wegovy, f/u 2 months after starting meds.  Send in fioricet for [facility]    Health Maintenance   Topic Date Due    Mammogram  2024    TETANUS VACCINE  2028    Hepatitis C Screening  Completed    Lipid Panel  Completed       Past Medical History:   Diagnosis Date    Anxiety     Depression     Severe pre-eclampsia in third trimester 10/30/2018    Tobacco use     Vaginal delivery 10/31/2018     after induction at 36 weeks due to pre-eclampsia with severe features.       Past Surgical History:   Procedure Laterality Date    PELVIC LAPAROSCOPY      Age 15 and Age 25    VAGINAL DELIVERY  , ,        family history includes Appendicitis in her paternal grandmother; Breast cancer in her maternal grandmother; Depression in her sister; Diabetes in her father; Heart disease in her father; Hypertension in her father; Kidney disease in her maternal grandfather; No Known Problems in her mother and sister.    Social History     Tobacco Use    Smoking status: Every Day     Types: Vaping with nicotine    Smokeless tobacco: Never   Substance Use Topics    Alcohol use: Not Currently     Comment: rare    Drug use: No       Review of Systems   Constitutional:  Positive for malaise/fatigue. Negative for chills and fever.   Eyes:  Negative for blurred vision.   Respiratory:  Negative for shortness of breath.    Cardiovascular:  Negative for chest pain and palpitations.   Gastrointestinal:  Negative  for abdominal pain, nausea and vomiting.   Genitourinary:  Negative for dysuria.   Neurological:  Positive for headaches. Negative for dizziness.   Psychiatric/Behavioral:  Negative for depression. The patient is not nervous/anxious.       Outpatient Encounter Medications as of 6/7/2023   Medication Sig Dispense Refill    citalopram (CELEXA) 40 MG tablet Take 1 tablet (40 mg total) by mouth once daily. 90 tablet 3    hydroCHLOROthiazide (HYDRODIURIL) 12.5 MG Tab Take 1 tablet (12.5 mg total) by mouth once daily. 30 tablet 0    ibuprofen (ADVIL,MOTRIN) 600 MG tablet Take 600 mg by mouth 3 (three) times daily.      medroxyPROGESTERone (DEPO-PROVERA) 150 mg/mL Syrg INJECT 1 ML INTO THE MUSCLE EVERY 3 MONTHS 1 mL 3    butalbital-acetaminophen-caffeine -40 mg (FIORICET, ESGIC) -40 mg per tablet Take 1 tablet by mouth every 4 (four) hours as needed for Pain or Headaches (take at onset of headache). 30 tablet 0    losartan (COZAAR) 25 MG tablet Take 1 tablet (25 mg total) by mouth once daily. 90 tablet 3    semaglutide, weight loss, (WEGOVY) 0.25 mg/0.5 mL PnIj Inject subcutaneous 0.25 mg every 7 days x 4 weeks, then increase to 0.5 mg every 7 days x 4 weeks. 1 each 3     Facility-Administered Encounter Medications as of 6/7/2023   Medication Dose Route Frequency Provider Last Rate Last Admin    medroxyPROGESTERone (DEPO-PROVERA) injection 150 mg  150 mg Intramuscular Q90 Days Keith Ruby MD   150 mg at 10/05/22 1552        Review of patient's allergies indicates:  No Known Allergies    Physical Exam      Vital Signs  Pulse: 100  Resp: 16  BP: (!) 138/90  BP Location: Right arm  Patient Position: Sitting  Height and Weight  Weight: 107.7 kg (237 lb 7 oz)    Physical Exam  Vitals and nursing note reviewed.   Constitutional:       General: She is not in acute distress.     Appearance: Normal appearance. She is not ill-appearing.   HENT:      Head: Normocephalic and atraumatic.   Cardiovascular:      Rate and  Rhythm: Normal rate and regular rhythm.      Heart sounds: Normal heart sounds.   Pulmonary:      Effort: Pulmonary effort is normal. No respiratory distress.      Breath sounds: Normal breath sounds.   Musculoskeletal:         General: Normal range of motion.   Skin:     General: Skin is warm and dry.   Neurological:      General: No focal deficit present.      Mental Status: She is alert and oriented to person, place, and time.   Psychiatric:         Mood and Affect: Mood normal.         Behavior: Behavior normal.         Thought Content: Thought content normal.         Judgment: Judgment normal.        Laboratory:  CBC:  Lab Results   Component Value Date    WBC 6.10 05/24/2023    RBC 4.73 05/24/2023    HGB 14.0 05/24/2023    HCT 44.3 05/24/2023     05/24/2023    MCV 94 05/24/2023    MCH 29.6 05/24/2023    MCHC 31.6 (L) 05/24/2023    MCHC 32.2 11/01/2018    MCHC 32.6 10/30/2018     CMP:  Lab Results   Component Value Date    GLU 92 05/24/2023    CALCIUM 9.2 05/24/2023    ALBUMIN 4.0 05/24/2023    PROT 7.4 05/24/2023     05/24/2023    K 4.4 05/24/2023    CO2 23 05/24/2023     05/24/2023    BUN 12 05/24/2023    ALKPHOS 89 05/24/2023    ALT 15 05/24/2023    AST 14 05/24/2023    BILITOT 0.4 05/24/2023    BILITOT 0.3 10/30/2018    BILITOT 0.3 10/26/2018     URINALYSIS:  Lab Results   Component Value Date    COLORU YELLOW 06/07/2004    SPECGRAV 1.019 06/07/2004    PHUR 6.0 06/07/2004    PROTEINUA NEG 06/07/2004    NITRITE NEG 06/07/2004    LEUKOCYTESUR NEG 06/07/2004    UROBILINOGEN 1 06/07/2004      LIPIDS:  Lab Results   Component Value Date    TSH 1.250 05/24/2023    TSH 1.201 06/15/2018    HDL 38 (L) 05/24/2023    CHOL 186 05/24/2023    TRIG 123 05/24/2023    LDLCALC 123.4 05/24/2023    CHOLHDL 20.4 05/24/2023    NONHDLCHOL 148 05/24/2023    TOTALCHOLEST 4.9 05/24/2023     TSH:  Lab Results   Component Value Date    TSH 1.250 05/24/2023    TSH 1.201 06/15/2018     A1C:  Lab Results   Component  Value Date    HGBA1C 5.6 05/24/2023         Assessment/Plan     Missy Fernandez is a 42 y.o.female with:    Primary hypertension  -     butalbital-acetaminophen-caffeine -40 mg (FIORICET, ESGIC) -40 mg per tablet; Take 1 tablet by mouth every 4 (four) hours as needed for Pain or Headaches (take at onset of headache).  Dispense: 30 tablet; Refill: 0  -     losartan (COZAAR) 25 MG tablet; Take 1 tablet (25 mg total) by mouth once daily.  Dispense: 90 tablet; Refill: 3  -     semaglutide, weight loss, (WEGOVY) 0.25 mg/0.5 mL PnIj; Inject subcutaneous 0.25 mg every 7 days x 4 weeks, then increase to 0.5 mg every 7 days x 4 weeks.  Dispense: 1 each; Refill: 3    Chronic migraine without aura without status migrainosus, not intractable  -     butalbital-acetaminophen-caffeine -40 mg (FIORICET, ESGIC) -40 mg per tablet; Take 1 tablet by mouth every 4 (four) hours as needed for Pain or Headaches (take at onset of headache).  Dispense: 30 tablet; Refill: 0  -     losartan (COZAAR) 25 MG tablet; Take 1 tablet (25 mg total) by mouth once daily.  Dispense: 90 tablet; Refill: 3  -     semaglutide, weight loss, (WEGOVY) 0.25 mg/0.5 mL PnIj; Inject subcutaneous 0.25 mg every 7 days x 4 weeks, then increase to 0.5 mg every 7 days x 4 weeks.  Dispense: 1 each; Refill: 3    Severe obesity (BMI 35.0-39.9) with comorbidity  -     semaglutide, weight loss, (WEGOVY) 0.25 mg/0.5 mL PnIj; Inject subcutaneous 0.25 mg every 7 days x 4 weeks, then increase to 0.5 mg every 7 days x 4 weeks.  Dispense: 1 each; Refill: 3    Weight gain  -     semaglutide, weight loss, (WEGOVY) 0.25 mg/0.5 mL PnIj; Inject subcutaneous 0.25 mg every 7 days x 4 weeks, then increase to 0.5 mg every 7 days x 4 weeks.  Dispense: 1 each; Refill: 3     Stay hydrated with water, low sodium in diet   Will add losartan to hydrochlorothiazide, monitor blood pressure and record  Take Fioricet as prescribed for migraines, if no relief once starting to  lose weight may need to refer to Neurology for further eval  Will order Wegovy to help with weight loss    Health Maintenance Due   Topic Date Due    Pneumococcal Vaccines (Age 0-64) (1 - PCV) Never done    COVID-19 Vaccine (3 - Pfizer series) 11/16/2021        I spent 35 minutes on the day of this encounter for preparing for, evaluating, treating, and managing this patient.      -Continue current medications and maintain follow up with specialists.  Return to clinic in 2 months after starting will go the No follow-ups on file.       ANT McfarlandC  [facility]

## 2023-06-08 RX ORDER — SEMAGLUTIDE 0.25 MG/.5ML
INJECTION, SOLUTION SUBCUTANEOUS
Qty: 1 EACH | Refills: 3 | Status: SHIPPED | OUTPATIENT
Start: 2023-06-08 | End: 2024-03-08

## 2023-06-22 DIAGNOSIS — R03.0 ELEVATED BLOOD PRESSURE READING WITHOUT DIAGNOSIS OF HYPERTENSION: ICD-10-CM

## 2023-06-22 RX ORDER — HYDROCHLOROTHIAZIDE 12.5 MG/1
TABLET ORAL
Qty: 30 TABLET | Refills: 0 | Status: SHIPPED | OUTPATIENT
Start: 2023-06-22 | End: 2023-07-17

## 2023-07-17 DIAGNOSIS — R03.0 ELEVATED BLOOD PRESSURE READING WITHOUT DIAGNOSIS OF HYPERTENSION: ICD-10-CM

## 2023-07-17 RX ORDER — HYDROCHLOROTHIAZIDE 12.5 MG/1
TABLET ORAL
Qty: 30 TABLET | Refills: 0 | Status: SHIPPED | OUTPATIENT
Start: 2023-07-17 | End: 2023-08-24

## 2023-08-24 DIAGNOSIS — R03.0 ELEVATED BLOOD PRESSURE READING WITHOUT DIAGNOSIS OF HYPERTENSION: ICD-10-CM

## 2023-08-24 RX ORDER — HYDROCHLOROTHIAZIDE 12.5 MG/1
TABLET ORAL
Qty: 30 TABLET | Refills: 0 | Status: SHIPPED | OUTPATIENT
Start: 2023-08-24 | End: 2023-10-04

## 2023-10-04 DIAGNOSIS — R03.0 ELEVATED BLOOD PRESSURE READING WITHOUT DIAGNOSIS OF HYPERTENSION: ICD-10-CM

## 2023-10-04 RX ORDER — HYDROCHLOROTHIAZIDE 12.5 MG/1
TABLET ORAL
Qty: 30 TABLET | Refills: 0 | Status: SHIPPED | OUTPATIENT
Start: 2023-10-04 | End: 2023-11-08

## 2023-10-09 DIAGNOSIS — Z01.419 ENCOUNTER FOR GYNECOLOGICAL EXAMINATION: ICD-10-CM

## 2023-10-09 RX ORDER — CITALOPRAM 40 MG/1
40 TABLET, FILM COATED ORAL DAILY
Qty: 30 TABLET | Refills: 4 | Status: SHIPPED | OUTPATIENT
Start: 2023-10-09 | End: 2024-03-06

## 2023-11-08 DIAGNOSIS — R03.0 ELEVATED BLOOD PRESSURE READING WITHOUT DIAGNOSIS OF HYPERTENSION: ICD-10-CM

## 2023-11-08 RX ORDER — HYDROCHLOROTHIAZIDE 12.5 MG/1
TABLET ORAL
Qty: 90 TABLET | Refills: 0 | Status: SHIPPED | OUTPATIENT
Start: 2023-11-08

## 2024-03-06 DIAGNOSIS — Z01.419 ENCOUNTER FOR GYNECOLOGICAL EXAMINATION: ICD-10-CM

## 2024-03-06 RX ORDER — CITALOPRAM 40 MG/1
40 TABLET, FILM COATED ORAL
Qty: 90 TABLET | Refills: 0 | Status: SHIPPED | OUTPATIENT
Start: 2024-03-06 | End: 2024-06-04

## 2024-03-06 NOTE — TELEPHONE ENCOUNTER
Refill Decision Note   Missy Fernandez  is requesting a refill authorization.  Brief Assessment and Rationale for Refill:  Approve     Medication Therapy Plan:        Comments:     Note composed:1:14 PM 03/06/2024

## 2024-03-07 NOTE — PROGRESS NOTES
"OBSTETRICS AND GYNECOLOGY    Chief Complaint:  Well Woman Exam     HPI:      Missy Fernandez is a 43 y.o.  who presents today for well woman exam.    LMP: several years (while on DMPA). Specifically, patient denies abnormal vaginal bleeding, abnormal discharge/odor, pelvic pain, or dysuria/hematuria. Did note a vulvar lesion today while in the shower. Ms. Fernandez is currently sexually active with a single male partner. She is currently using no method for contraception. She declines STD screening today. She denies additional issues, problems, or complaints.     Gardasil: Pt Unsure   Discussed is a candidate if interested and can get a quote from insurance as 3 injection series; patient to let us know.    Ms. Fernandez confirms that she wears her seatbelt when riding in the car.     OB History          4    Para   4    Term   3       1    AB        Living   3         SAB        IAB        Ectopic        Multiple        Live Births   3           Obstetric Comments   Menarche 14             ROS:     GENERAL: Feeling well overall.   BREASTS: Denies breast skin changes, lumps or nipple discharge.   URINARY: Denies dysuria, hematuria.    Physical Exam:      PHYSICAL EXAM:  Ht 5' 5" (1.651 m)   Wt 106 kg (233 lb 11 oz)   BMI 38.89 kg/m²   Body mass index is 38.89 kg/m².     APPEARANCE:  Well nourished, well developed, in no acute distress.  Able to smile appropriately during our encounter. Makes eye contact. Pleasant.  PSYCH: Appropriate mood and affect.  SKIN:   No acne or hirsutism.  CARDIOVASCULAR:  No edema of peripheral extremities. Well perfused throughout.  RESP:  No accessory muscle use to breathe. Speaking comfortably in complete sentences.   BREASTS:  Symmetrical, with no visible skin lesions or scars, no palpable masses. No nipple discharge or peau d'orange bilaterally. No palpable axillary LAD.  ABDOMEN:  Soft. Nonacute.    PELVIC:  Normal external genitalia, small likely previously ingrown hair " on inferior aspect of right labia majora; about 0.3cm slightly raised area surrounding empty hair follicle; no bleeding, exudate, induration, fluctuance, or surrounding skin changes; no evidence of infection. Normal hair distribution. Adequate perineal body, normal urethral meatus. Vagina moist and well rugated. Without lesions, without discharge. Cervix 3x3cm. Cervix pink, without ectocervical lesions, discharge or tenderness.  No ectropion. No significant cystocele or rectocele.  Bimanual exam shows uterus to be normal size, regular, mobile and nontender.  Adnexa without masses or tenderness.      Female chaperone present.    Assessment/Plan:     Well Woman Exam  -- Counseled patient regarding healthy diet and regular exercise, daily seat belt use.   -- She denies abuse and feels safe at home.  -- Pap smear (9/2021):  NILM, HPV(-) (up to date, next due 9/2024)   -- Contraception:  none, declines today, aware of pregnancy possibility  -- STD screening:  declines   -- Ingrown hair as above, recs for heat application, sitz bath reviewed  Return precautions explained  -- MMG:  BiRADS Cat 1, TC 18.28% (2/2023); schedule  -- Labs, reviewed today:  A1c, TSH WNL 5/2023    Secondary Amenorrhea  - Previously on DMPA - last injection would have been due 12/2023  Amenorrhea while on DMPA  Believes sister went through menopause in her 40s  - Sexually active  - Surgical history:  diagnostic lap x 2 for endometriosis  - Medication list reviewed  - No history of brain radiation/trauma  - No vision changes, does endorse polydipsia, polyuria as of late  - Denies galactorrhea  - Denies worsening/change in stress level, but job can be stressful  - Denies new/change in exercise or diet  - Labs, UPT today  - F/U labs, anticipate schedule US, Progesterone challenge      Follow up in about 1 year (around 3/8/2025) for WWE.    Counseling:     Patient was counseled today on current ASCCP pap guidelines, the recommendation for yearly  physical exams, safe driving habits, and breast self awareness. She is to see her PCP for other health maintenance.     Use of the [employer] Patient Portal discussed and encouraged during today's visit.                 As of April 1, 2021, the Cures Act has been passed nationally. This new law requires that all doctors progress notes, lab results, pathology reports and radiology reports be released IMMEDIATELY to the patient in the patient portal. That means that the results are released to you at the EXACT same time they are released to me. Therefore, with all of the patients that I have I am not able to reply to each patient exactly when the results come in. So there will be a delay from when you see the results to when I see them and have time to come up with a response to send you. Also I only see these results when I am on the computer at work. So if the results come in over the weekend or after 5 pm of a work day, I will not see them until the next business day. As you can tell, this is a challenge as a physician to give every patient the quick response they hope for and deserve. So please be patient!   Thanks for your understanding and patience.

## 2024-03-08 ENCOUNTER — OFFICE VISIT (OUTPATIENT)
Dept: OBSTETRICS AND GYNECOLOGY | Facility: CLINIC | Age: 44
End: 2024-03-08
Payer: OTHER GOVERNMENT

## 2024-03-08 ENCOUNTER — LAB VISIT (OUTPATIENT)
Dept: LAB | Facility: HOSPITAL | Age: 44
End: 2024-03-08
Attending: STUDENT IN AN ORGANIZED HEALTH CARE EDUCATION/TRAINING PROGRAM
Payer: OTHER GOVERNMENT

## 2024-03-08 VITALS — WEIGHT: 233.69 LBS | BODY MASS INDEX: 38.93 KG/M2 | HEIGHT: 65 IN

## 2024-03-08 DIAGNOSIS — Z12.4 SCREENING FOR CERVICAL CANCER: ICD-10-CM

## 2024-03-08 DIAGNOSIS — N91.1 SECONDARY AMENORRHEA: ICD-10-CM

## 2024-03-08 DIAGNOSIS — Z11.51 SCREENING FOR HPV (HUMAN PAPILLOMAVIRUS): ICD-10-CM

## 2024-03-08 DIAGNOSIS — Z12.31 SCREENING MAMMOGRAM FOR BREAST CANCER: ICD-10-CM

## 2024-03-08 DIAGNOSIS — Z01.419 ENCOUNTER FOR GYNECOLOGICAL EXAMINATION: ICD-10-CM

## 2024-03-08 DIAGNOSIS — Z01.419 ENCOUNTER FOR WELL WOMAN EXAM: Primary | ICD-10-CM

## 2024-03-08 LAB
B-HCG UR QL: NEGATIVE
CTP QC/QA: YES
ESTRADIOL SERPL-MCNC: 36 PG/ML
FSH SERPL-ACNC: 8.48 MIU/ML
LH SERPL-ACNC: 3.1 MIU/ML
PROLACTIN SERPL IA-MCNC: 8.2 NG/ML (ref 5.2–26.5)
TSH SERPL DL<=0.005 MIU/L-ACNC: 1.7 UIU/ML (ref 0.4–4)

## 2024-03-08 PROCEDURE — 84146 ASSAY OF PROLACTIN: CPT | Performed by: STUDENT IN AN ORGANIZED HEALTH CARE EDUCATION/TRAINING PROGRAM

## 2024-03-08 PROCEDURE — 84443 ASSAY THYROID STIM HORMONE: CPT | Performed by: STUDENT IN AN ORGANIZED HEALTH CARE EDUCATION/TRAINING PROGRAM

## 2024-03-08 PROCEDURE — 81025 URINE PREGNANCY TEST: CPT | Mod: S$GLB,,, | Performed by: STUDENT IN AN ORGANIZED HEALTH CARE EDUCATION/TRAINING PROGRAM

## 2024-03-08 PROCEDURE — 83002 ASSAY OF GONADOTROPIN (LH): CPT | Performed by: STUDENT IN AN ORGANIZED HEALTH CARE EDUCATION/TRAINING PROGRAM

## 2024-03-08 PROCEDURE — 83001 ASSAY OF GONADOTROPIN (FSH): CPT | Performed by: STUDENT IN AN ORGANIZED HEALTH CARE EDUCATION/TRAINING PROGRAM

## 2024-03-08 PROCEDURE — 82670 ASSAY OF TOTAL ESTRADIOL: CPT | Performed by: STUDENT IN AN ORGANIZED HEALTH CARE EDUCATION/TRAINING PROGRAM

## 2024-03-08 PROCEDURE — 36415 COLL VENOUS BLD VENIPUNCTURE: CPT | Performed by: STUDENT IN AN ORGANIZED HEALTH CARE EDUCATION/TRAINING PROGRAM

## 2024-03-08 PROCEDURE — 99999 PR PBB SHADOW E&M-EST. PATIENT-LVL III: CPT | Mod: PBBFAC,,, | Performed by: STUDENT IN AN ORGANIZED HEALTH CARE EDUCATION/TRAINING PROGRAM

## 2024-03-08 PROCEDURE — 99396 PREV VISIT EST AGE 40-64: CPT | Mod: S$GLB,,, | Performed by: STUDENT IN AN ORGANIZED HEALTH CARE EDUCATION/TRAINING PROGRAM

## 2024-03-08 RX ORDER — ASPIRIN 325 MG
325 TABLET ORAL DAILY
COMMUNITY
End: 2024-04-12

## 2024-03-08 NOTE — PROGRESS NOTES
LMP: No LMP recorded. (Menstrual status: Other)..    Contraception: The current method of family planning is none  Meds per MD: none    Last Pap:  pap & hpv negative  Last MM2023 birads 1 [facility]

## 2024-03-14 ENCOUNTER — TELEPHONE (OUTPATIENT)
Dept: OBSTETRICS AND GYNECOLOGY | Facility: CLINIC | Age: 44
End: 2024-03-14
Payer: OTHER GOVERNMENT

## 2024-03-14 DIAGNOSIS — N91.1 SECONDARY AMENORRHEA: Primary | ICD-10-CM

## 2024-03-14 NOTE — TELEPHONE ENCOUNTER
Called pt to schedule appts  Pt agreed to be seen on 04-12-24 starting at 8:30 am    No further questions  ND

## 2024-03-14 NOTE — TELEPHONE ENCOUNTER
----- Message from Maya Madrid MD sent at 3/13/2024  5:53 PM CDT -----  Please schedule for GYN ultrasound. Indication: secondary amenorrhea. To be anytime in the next 1-5 weeks. Can be with MD visit to follow or without, whichever works best. Thank you!

## 2024-03-19 ENCOUNTER — HOSPITAL ENCOUNTER (OUTPATIENT)
Dept: RADIOLOGY | Facility: HOSPITAL | Age: 44
Discharge: HOME | End: 2024-03-19
Attending: STUDENT IN AN ORGANIZED HEALTH CARE EDUCATION/TRAINING PROGRAM
Payer: OTHER GOVERNMENT

## 2024-03-19 VITALS — HEIGHT: 65 IN | WEIGHT: 233 LBS | BODY MASS INDEX: 38.82 KG/M2

## 2024-03-19 DIAGNOSIS — Z12.31 SCREENING MAMMOGRAM FOR BREAST CANCER: ICD-10-CM

## 2024-03-19 PROCEDURE — 77063 BREAST TOMOSYNTHESIS BI: CPT | Mod: 26,,, | Performed by: RADIOLOGY

## 2024-03-19 PROCEDURE — 77067 SCR MAMMO BI INCL CAD: CPT | Mod: 26,,, | Performed by: RADIOLOGY

## 2024-03-19 PROCEDURE — 77067 SCR MAMMO BI INCL CAD: CPT | Mod: TC

## 2024-03-22 ENCOUNTER — TELEPHONE (OUTPATIENT)
Dept: OBSTETRICS AND GYNECOLOGY | Facility: CLINIC | Age: 44
End: 2024-03-22
Payer: OTHER GOVERNMENT

## 2024-03-22 NOTE — TELEPHONE ENCOUNTER
Called pt to follow up in regards to scheduling US  Do not see US scheduled    No answer  LVM and call back number    ND

## 2024-04-05 ENCOUNTER — TELEPHONE (OUTPATIENT)
Dept: OBSTETRICS AND GYNECOLOGY | Facility: CLINIC | Age: 44
End: 2024-04-05
Payer: OTHER GOVERNMENT

## 2024-04-05 NOTE — TELEPHONE ENCOUNTER
Called pt to follow up in regards to upcoming appt with US prior    No answer  Lvm and call back number    ND

## 2024-04-09 NOTE — PROGRESS NOTES
OBSTETRICS AND GYNECOLOGY    Subjective:      Chief Complaint:  US F/U    HPI:  Missy Fernandez is an 43 y.o.  presenting for US F/U. No acute complaints.    Review of systems:  Denies vaginal bleeding.     OB History    Para Term  AB Living   4 4 3 1   3   SAB IAB Ectopic Multiple Live Births           3      # Outcome Date GA Lbr Henry/2nd Weight Sex Delivery Anes PTL Lv   4  10/31/18 36w5d / 00:20 2.82 kg (6 lb 3.5 oz) M Vag-Spont EPI Y AMILCAR   3 Term  40w0d  3.629 kg (8 lb) M Vag-Spont   AMILCAR      Birth Comments: pp preEclampsia admit for Magnesium sulfate   2 Term  40w0d  3.09 kg (6 lb 13 oz) F Vag-Spont   AMILCAR   1 Term               Obstetric Comments   Menarche 14     Past Medical History:   Diagnosis Date    Anxiety     Depression     Severe pre-eclampsia in third trimester 10/30/2018    Tobacco use     Vaginal delivery 10/31/2018     after induction at 36 weeks due to pre-eclampsia with severe features.     Past Surgical History:   Procedure Laterality Date    PELVIC LAPAROSCOPY      Age 15 and Age 25    VAGINAL DELIVERY  , ,      Family History   Problem Relation Age of Onset    No Known Problems Mother     Heart disease Father     Hypertension Father     Diabetes Father     No Known Problems Sister     Depression Sister     Breast cancer Maternal Grandmother         each breast at different times    Kidney disease Maternal Grandfather     Appendicitis Paternal Grandmother     Ovarian cancer Neg Hx     Colon cancer Neg Hx        Allergies: Review of patient's allergies indicates:  No Known Allergies    Medications:   Current Outpatient Medications   Medication Sig Dispense Refill    citalopram (CELEXA) 40 MG tablet Take 1 tablet by mouth once daily 90 tablet 0    hydroCHLOROthiazide (HYDRODIURIL) 12.5 MG Tab Take 1 tablet by mouth once daily 90 tablet 0    ibuprofen (ADVIL,MOTRIN) 600 MG tablet Take 600 mg by mouth 3 (three) times daily.      losartan (COZAAR) 25  "MG tablet Take 1 tablet (25 mg total) by mouth once daily. 90 tablet 3     No current facility-administered medications for this visit.       Social History     Tobacco Use    Smoking status: Every Day     Types: Vaping with nicotine    Smokeless tobacco: Never   Substance Use Topics    Alcohol use: Not Currently     Comment: rare       Objective:   /85 (BP Location: Left arm, Patient Position: Sitting)   Pulse 92   Ht 5' 5" (1.651 m)   Wt 104 kg (229 lb 4.5 oz)   BMI 38.15 kg/m²   Physical Exam    GENERAL: Alert, well dressed, well nourished. Appropriate mood and affect. Pleasant.  HEENT: Normocephalic, atraumatic. Extraocular movements intact. Hearing and vision grossly intact.   PULMONARY: No respiratory distress. No use of accessory muscles of respiration. No cyanosis. Speaking comfortably in full sentences.   CARDIAC: Well perfused.    ABDOMEN: Soft. Non-tender, non-distended. No rebound, guarding, or rigidity.   EXTREMITIES: No edema. No visible rashes.     Assessment/Plan:     Secondary Amenorrhea  - Previously on DMPA - last injection would have been due 12/2023  Amenorrhea while on DMPA  On DMPA for about 4 years; also use prior to last pregnancy  - Believes sister went through menopause in her 40s  - Sexually active  - Surgical history:  diagnostic lap x 2 for endometriosis  - Medication list reviewed  - No history of brain radiation/trauma  - No vision changes, did previously endorse polydipsia, polyuria as of late  PRL WNL  - Denied galactorrhea  - Denied worsening/change in stress level, but job can be stressful  - Denied new/change in exercise or diet  - Labs:  TSH WNL. Estradiol/LH/FSH reviewed  - Rec start Vitamin D, calcium supplement  - UPT   - US today, reviewed  - Progesterone challenge  Provera 10mg PO once daily x7d  To let me know if any issues  - F/U 4wks    Contraception Counseling  -- Counseled patient regarding various methods of birth control methods available, to include: " progesterone only pills, IUD (hormonal vs copper), Depo-Provera, condoms, Nexplanon, BTL, and vasectomy.   -- Vapes with nicotine products.  -- Literature provided on nexplanon  -- Discussed risk of decrease in BMD with continuous DMPA, however, has been effective with patient's endometriosis  Risk/benefit discussion  -- At this time, will plan on provera challenge as above; initiate vitamin D/calcium supplement  F/U for contraception plan        As of April 1, 2021, the Cures Act has been passed nationally. This new law requires that all doctors progress notes, lab results, pathology reports and radiology reports be released IMMEDIATELY to the patient in the patient portal. That means that the results are released to you at the EXACT same time they are released to me. Therefore, with all of the patients that I have I am not able to reply to each patient exactly when the results come in. So there will be a delay from when you see the results to when I see them and have time to come up with a response to send you. Also I only see these results when I am on the computer at work. So if the results come in over the weekend or after 5 pm of a work day, I will not see them until the next business day. As you can tell, this is a challenge as a physician to give every patient the quick response they hope for and deserve. So please be patient!   Thanks for your understanding and patience.

## 2024-04-12 ENCOUNTER — OFFICE VISIT (OUTPATIENT)
Dept: OBSTETRICS AND GYNECOLOGY | Facility: CLINIC | Age: 44
End: 2024-04-12
Payer: OTHER GOVERNMENT

## 2024-04-12 VITALS
WEIGHT: 229.25 LBS | HEART RATE: 92 BPM | HEIGHT: 65 IN | SYSTOLIC BLOOD PRESSURE: 131 MMHG | DIASTOLIC BLOOD PRESSURE: 85 MMHG | BODY MASS INDEX: 38.2 KG/M2

## 2024-04-12 DIAGNOSIS — N91.1 SECONDARY AMENORRHEA: Primary | ICD-10-CM

## 2024-04-12 LAB
B-HCG UR QL: NEGATIVE
CTP QC/QA: YES

## 2024-04-12 PROCEDURE — 81025 URINE PREGNANCY TEST: CPT | Mod: S$GLB,,, | Performed by: STUDENT IN AN ORGANIZED HEALTH CARE EDUCATION/TRAINING PROGRAM

## 2024-04-12 PROCEDURE — 99214 OFFICE O/P EST MOD 30 MIN: CPT | Mod: S$GLB,,, | Performed by: STUDENT IN AN ORGANIZED HEALTH CARE EDUCATION/TRAINING PROGRAM

## 2024-04-12 PROCEDURE — 99999 PR PBB SHADOW E&M-EST. PATIENT-LVL III: CPT | Mod: PBBFAC,,, | Performed by: STUDENT IN AN ORGANIZED HEALTH CARE EDUCATION/TRAINING PROGRAM

## 2024-04-12 RX ORDER — MEDROXYPROGESTERONE ACETATE 10 MG/1
10 TABLET ORAL DAILY
Qty: 7 TABLET | Refills: 0 | Status: SHIPPED | OUTPATIENT
Start: 2024-04-12 | End: 2024-06-04

## 2024-04-16 DIAGNOSIS — R03.0 ELEVATED BLOOD PRESSURE READING WITHOUT DIAGNOSIS OF HYPERTENSION: ICD-10-CM

## 2024-04-16 RX ORDER — HYDROCHLOROTHIAZIDE 12.5 MG/1
TABLET ORAL
Qty: 90 TABLET | Refills: 0 | Status: SHIPPED | OUTPATIENT
Start: 2024-04-16

## 2024-05-09 ENCOUNTER — TELEPHONE (OUTPATIENT)
Dept: OBSTETRICS AND GYNECOLOGY | Facility: CLINIC | Age: 44
End: 2024-05-09
Payer: OTHER GOVERNMENT

## 2024-05-09 NOTE — TELEPHONE ENCOUNTER
Pt finished Provera last Wednesday.  Had breast swelling and tenderness and uterine cramping last Wednesday/Thursday but never had any bleeding.

## 2024-05-10 ENCOUNTER — TELEPHONE (OUTPATIENT)
Dept: PRIMARY CARE CLINIC | Facility: CLINIC | Age: 44
End: 2024-05-10
Payer: OTHER GOVERNMENT

## 2024-05-10 VITALS — DIASTOLIC BLOOD PRESSURE: 85 MMHG | SYSTOLIC BLOOD PRESSURE: 131 MMHG

## 2024-05-14 ENCOUNTER — PATIENT MESSAGE (OUTPATIENT)
Dept: OBSTETRICS AND GYNECOLOGY | Facility: CLINIC | Age: 44
End: 2024-05-14

## 2024-05-14 ENCOUNTER — OFFICE VISIT (OUTPATIENT)
Dept: OBSTETRICS AND GYNECOLOGY | Facility: CLINIC | Age: 44
End: 2024-05-14
Payer: OTHER GOVERNMENT

## 2024-05-14 DIAGNOSIS — N91.1 SECONDARY AMENORRHEA: Primary | ICD-10-CM

## 2024-05-14 DIAGNOSIS — N80.9 ENDOMETRIOSIS: ICD-10-CM

## 2024-05-14 PROCEDURE — 99214 OFFICE O/P EST MOD 30 MIN: CPT | Mod: 95,,, | Performed by: NURSE PRACTITIONER

## 2024-05-14 NOTE — PROGRESS NOTES
Subjective:      Missy Fernandez is a 43 y.o. female who presents to discuss hormone replacement therapy via virtual visit. Long standing history of endometriosis, use of DMPA injections for cycle suppression for many years. Very effective. Noted some endometriosis type pain and possible hot flashes in the past year. Discontinuation of DMPA with last dose in early Fall 2023.     Placement of Mirena with expulsiion x 2 in the past. Tobacco use is reported.    No return on menses since discontinuation of DMPA, no reported endometriosis type pain. Recent Provera challenge without activation of menses. Secondary amenorrhea lab work is in normal range. Pelvic US with noted 13 mm endometrial stripe.       Hemoglobin A1C   Date Value Ref Range Status   05/24/2023 5.6 4.0 - 5.6 % Final     Comment:     ADA Screening Guidelines:  5.7-6.4%  Consistent with prediabetes  >or=6.5%  Consistent with diabetes    High levels of fetal hemoglobin interfere with the HbA1C  assay. Heterozygous hemoglobin variants (HbS, HgC, etc)do  not significantly interfere with this assay.   However, presence of multiple variants may affect accuracy.           Office Visit on 04/12/2024   Component Date Value Ref Range Status    POC Preg Test, Ur 04/12/2024 Negative  Negative Final     Acceptable 04/12/2024 Yes   Final   Lab Visit on 03/08/2024   Component Date Value Ref Range Status    TSH 03/08/2024 1.702  0.400 - 4.000 uIU/mL Final    Prolactin 03/08/2024 8.2  5.2 - 26.5 ng/mL Final    Follicle Stimulating Hormone 03/08/2024 8.48  See Text mIU/mL Final    LH 03/08/2024 3.1  See Text mIU/mL Final    Estradiol 03/08/2024 36  See Text pg/mL Final   Office Visit on 03/08/2024   Component Date Value Ref Range Status    POC Preg Test, Ur 03/08/2024 Negative  Negative Final     Acceptable 03/08/2024 Yes   Final       Past Medical History:   Diagnosis Date    Anxiety     Depression     Severe pre-eclampsia in third trimester  10/30/2018    Tobacco use     Vaginal delivery 10/31/2018     after induction at 36 weeks due to pre-eclampsia with severe features.     Past Surgical History:   Procedure Laterality Date    PELVIC LAPAROSCOPY      Age 15 and Age 25    VAGINAL DELIVERY  , ,      Social History     Tobacco Use    Smoking status: Every Day     Types: Vaping with nicotine    Smokeless tobacco: Never   Substance Use Topics    Alcohol use: Not Currently     Comment: rare    Drug use: No     Family History   Problem Relation Name Age of Onset    No Known Problems Mother      Heart disease Father Kt Valdez     Hypertension Father Kt Valdez     Diabetes Father Kt Valdez     No Known Problems Sister      Depression Sister She Valdez     Breast cancer Maternal Grandmother          each breast at different times    Kidney disease Maternal Grandfather      Appendicitis Paternal Grandmother      Ovarian cancer Neg Hx      Colon cancer Neg Hx       OB History    Para Term  AB Living   4 4 3 1   3   SAB IAB Ectopic Multiple Live Births           3      # Outcome Date GA Lbr Henry/2nd Weight Sex Type Anes PTL Lv   4  10/31/18 36w5d / 00:20 2.82 kg (6 lb 3.5 oz) M Vag-Spont EPI Y AMILCAR   3 Term  40w0d  3.629 kg (8 lb) M Vag-Spont   AMILCAR      Birth Comments: pp preEclampsia admit for Magnesium sulfate   2 Term  40w0d  3.09 kg (6 lb 13 oz) F Vag-Spont   AMILCAR   1 Term               Obstetric Comments   Menarche 14       Current Outpatient Medications:     citalopram (CELEXA) 40 MG tablet, Take 1 tablet by mouth once daily, Disp: 90 tablet, Rfl: 0    hydroCHLOROthiazide (HYDRODIURIL) 12.5 MG Tab, Take 1 tablet by mouth once daily, Disp: 90 tablet, Rfl: 0    ibuprofen (ADVIL,MOTRIN) 600 MG tablet, Take 600 mg by mouth 3 (three) times daily., Disp: , Rfl:     losartan (COZAAR) 25 MG tablet, Take 1 tablet (25 mg total) by mouth once daily., Disp: 90 tablet, Rfl: 3    medroxyPROGESTERone (PROVERA) 10  MG tablet, Take 1 tablet (10 mg total) by mouth once daily., Disp: 7 tablet, Rfl: 0    There were no vitals filed for this visit.  There is no height or weight on file to calculate BMI.    Assessment:    Secondary amenorrhea    Endometriosis        Plan:   Risks and benefits of hormone replacement therapy were discussed.  Hormone replacement therapy options, including bioidentical versus non-bioidentical hormones, as well as alternatives discussed.    Discussed return of menses can take up to 12-18 months with endocrine suppression of DMPA. Concern for gradual return of menses/endometriosis without use of treatment. Plan to discuss with Dr. Shea for additional recommendations.    Follow up in pending additional recommendations.       JOSE Mata

## 2024-05-21 ENCOUNTER — PATIENT MESSAGE (OUTPATIENT)
Dept: OBSTETRICS AND GYNECOLOGY | Facility: CLINIC | Age: 44
End: 2024-05-21
Payer: OTHER GOVERNMENT

## 2024-05-28 ENCOUNTER — OFFICE VISIT (OUTPATIENT)
Dept: OBSTETRICS AND GYNECOLOGY | Facility: CLINIC | Age: 44
End: 2024-05-28
Payer: OTHER GOVERNMENT

## 2024-05-28 DIAGNOSIS — N91.1 SECONDARY AMENORRHEA: Primary | ICD-10-CM

## 2024-05-28 PROCEDURE — 99212 OFFICE O/P EST SF 10 MIN: CPT | Mod: 95,,, | Performed by: STUDENT IN AN ORGANIZED HEALTH CARE EDUCATION/TRAINING PROGRAM

## 2024-05-28 NOTE — PROGRESS NOTES
The chief complaint leading to consultation is: secondary amenorrhea    Visit type: audio only    Time with patient: 9 minutes  20 minutes of total time spent on the encounter, which includes face to face time and non-face to face time preparing to see the patient (eg, review of tests), Obtaining and/or reviewing separately obtained history, Documenting clinical information in the electronic or other health record, Independently interpreting results (not separately reported) and communicating results to the patient/family/caregiver, or Care coordination (not separately reported).     Each patient to whom he or she provides medical services by telemedicine is:  (1) informed of the relationship between the physician and patient and the respective role of any other health care provider with respect to management of the patient; and (2) notified that he or she may decline to receive medical services by telemedicine and may withdraw from such care at any time.    Notes:     Chief Complaint: BC     HPI:      Missy Fernandez is a 43 y.o.  who presents today for BC counseling. Unsure how she would like to proceed. No menses. Provera challenge without bleeding. No acute complaints.     Physical Exam:     GEN: No respiratory distress appreciated. Pleasant. Speaking comfortably in full sentences.    Results:     N/A    Assessment/Plan:     Secondary amenorrhea      -- History of endometriosis, well controlled on DMPA   -- On DMPA for about 4 years; also use prior to last pregnancy   -- Amenorrhea with DMPA  -- Amenorrhea since discontinuation  -- Discussed risk of decrease in BMD with continuous DMPA, however, has been effective with patient's endometriosis  -- Vapes with nicotine products.  -- Labs:  TSH WNL. Estradiol/LH/FSH reviewed   -- Pelvic US 2024 reviewed  -- Consider repeat progesterone challenge vs SIS  -- Patient states she spoke with Adrienne Riley regarding hormonal options  At this time, will discuss with  Adrienne Riley and patient regarding best options moving forward  Will reach back out to patient  Pt's questions answered

## 2024-05-29 DIAGNOSIS — I10 PRIMARY HYPERTENSION: ICD-10-CM

## 2024-06-04 DIAGNOSIS — N80.9 ENDOMETRIOSIS: Primary | ICD-10-CM

## 2024-06-04 DIAGNOSIS — Z01.419 ENCOUNTER FOR GYNECOLOGICAL EXAMINATION: ICD-10-CM

## 2024-06-04 RX ORDER — MEDROXYPROGESTERONE ACETATE 150 MG/ML
150 INJECTION, SUSPENSION INTRAMUSCULAR
Qty: 1 ML | Refills: 4 | Status: SHIPPED | OUTPATIENT
Start: 2024-06-04

## 2024-06-04 RX ORDER — CITALOPRAM 40 MG/1
40 TABLET, FILM COATED ORAL
Qty: 90 TABLET | Refills: 3 | Status: SHIPPED | OUTPATIENT
Start: 2024-06-04

## 2024-06-04 NOTE — PROGRESS NOTES
Called lab to run PT and APTT    Contacted patient Missy Fernandez today, 6/4/2024, at approximately 12:07 PM. Patient identifiers confirmed.   Discussed DMPA in setting of endometriosis, perimenopause. OK to continue as helping to control endometriosis. Pt agrees. Rx sent. Patient instructed to take home UPT and ensure negative prior to injection, pt verbalized understanding.

## 2024-06-04 NOTE — TELEPHONE ENCOUNTER
Refill Decision Note   Missy Fernandez  is requesting a refill authorization.  Brief Assessment and Rationale for Refill:  Approve     Medication Therapy Plan:         Comments:     Note composed:11:20 AM 06/04/2024

## 2024-07-16 DIAGNOSIS — R03.0 ELEVATED BLOOD PRESSURE READING WITHOUT DIAGNOSIS OF HYPERTENSION: ICD-10-CM

## 2024-07-16 RX ORDER — HYDROCHLOROTHIAZIDE 12.5 MG/1
TABLET ORAL
Qty: 90 TABLET | Refills: 0 | Status: SHIPPED | OUTPATIENT
Start: 2024-07-16

## 2024-08-07 ENCOUNTER — PATIENT MESSAGE (OUTPATIENT)
Dept: PRIMARY CARE CLINIC | Facility: CLINIC | Age: 44
End: 2024-08-07
Payer: OTHER GOVERNMENT

## 2024-08-07 DIAGNOSIS — E66.01 SEVERE OBESITY (BMI 35.0-39.9) WITH COMORBIDITY: ICD-10-CM

## 2024-08-07 DIAGNOSIS — G43.709 CHRONIC MIGRAINE WITHOUT AURA WITHOUT STATUS MIGRAINOSUS, NOT INTRACTABLE: ICD-10-CM

## 2024-08-07 DIAGNOSIS — R63.5 WEIGHT GAIN: ICD-10-CM

## 2024-08-07 DIAGNOSIS — I10 PRIMARY HYPERTENSION: ICD-10-CM

## 2024-08-07 RX ORDER — SEMAGLUTIDE 0.25 MG/.5ML
INJECTION, SOLUTION SUBCUTANEOUS
Qty: 2 ML | Refills: 0 | Status: SHIPPED | OUTPATIENT
Start: 2024-08-07

## 2024-08-12 DIAGNOSIS — I10 PRIMARY HYPERTENSION: ICD-10-CM

## 2024-08-12 DIAGNOSIS — G43.709 CHRONIC MIGRAINE WITHOUT AURA WITHOUT STATUS MIGRAINOSUS, NOT INTRACTABLE: ICD-10-CM

## 2024-08-12 RX ORDER — LOSARTAN POTASSIUM 25 MG/1
25 TABLET ORAL
Qty: 90 TABLET | Refills: 0 | Status: SHIPPED | OUTPATIENT
Start: 2024-08-12

## 2024-09-04 PROBLEM — I10 ESSENTIAL HYPERTENSION: Status: ACTIVE | Noted: 2024-09-04

## 2025-01-08 DIAGNOSIS — R03.0 ELEVATED BLOOD PRESSURE READING WITHOUT DIAGNOSIS OF HYPERTENSION: ICD-10-CM

## 2025-01-08 RX ORDER — HYDROCHLOROTHIAZIDE 12.5 MG/1
TABLET ORAL
Qty: 90 TABLET | Refills: 0 | Status: SHIPPED | OUTPATIENT
Start: 2025-01-08

## 2025-01-15 ENCOUNTER — TELEPHONE (OUTPATIENT)
Dept: OBSTETRICS AND GYNECOLOGY | Facility: CLINIC | Age: 45
End: 2025-01-15
Payer: COMMERCIAL

## 2025-01-15 ENCOUNTER — E-VISIT (OUTPATIENT)
Dept: OBSTETRICS AND GYNECOLOGY | Facility: CLINIC | Age: 45
End: 2025-01-15
Payer: COMMERCIAL

## 2025-01-15 DIAGNOSIS — R39.89 SUSPECTED UTI: Primary | ICD-10-CM

## 2025-01-15 PROCEDURE — 99421 OL DIG E/M SVC 5-10 MIN: CPT | Mod: ,,, | Performed by: STUDENT IN AN ORGANIZED HEALTH CARE EDUCATION/TRAINING PROGRAM

## 2025-01-15 NOTE — TELEPHONE ENCOUNTER
Pt thinks she has a UTI and would like rx.  C/o cloudy urine, dysuria, and urinary frequency.  States its not a yeast infection like original message from phone staff mentioned.  Unable to drop off urine sample today.  Discussed e-visit. Initiated.

## 2025-01-15 NOTE — PROGRESS NOTES
Patient ID: Missy Fernandez is a 44 y.o. female.    Chief Complaint: Urinary Tract Infection (Entered automatically based on patient selection in [employer].)    The patient initiated a request through [employer] on 1/15/2025 for evaluation and management with a chief complaint of Urinary Tract Infection (Entered automatically based on patient selection in [employer].)     I evaluated the questionnaire submission on 1/15/2025.    [facility] Evisit Uti Questionnaire    1/15/2025  3:06 PM CST - Filed by Patient   Do you agree to participate in an E-Visit? Yes   If you have any of the following symptoms, please present to your local emergency room or call 911:  I acknowledge   Choose the state of your primary residence [place]   Do you have any of the following pregnancy-related conditions? None   What is the main issue you would like addressed today? UTI   What symptoms do you currently have? Pain while passing urine;  Change in urine appearance or smell   When did your symptoms first appear? 1/12/2025   List what you have done or taken to help your symptoms. Drink more water   Please indicate whether you have had the following symptoms during the past 24 hours     Urgent urination (a sudden and uncontrollable urge to urinate) Moderate   Frequent urination of small amounts of urine (going to the toilet very often) Moderate   Burning pain when urinating Moderate   Incomplete bladder emptying (still feel like you need to urinate again after urination) Moderate   Pain not associated with urination in the lower abdomen below the belly button) None   What does your urine look like? Cloudy   Blood seen in the urine None   Flank pain (pain in one or both sides of the lower back) Moderate   Abnormal Vaginal Discharge (abnormal amount, color and/or odor) None   Discharge from the urethra (urinary opening) without urination None   High body temperature/fever? None-<99.5   Please rate how much discomfort you have experience because of the  symptoms in the past 24 hours: Moderate   Please indicate how the symptoms have interfered with your every day activities/work in the past 24 hours: None   Please indicate how these symptoms have interfered with your social activities (visiting people, meeting with friends, etc.) in the past 24 hours? None   Are you a diabetic? No   Please indicate whether you have the following at the time of completion of this questionnaire: None of the above   Provide any additional information you feel is important.    Please attach any relevant images or files (if you have performed a home test for UTI, please submit a photo of results)    Are you able to take your vital signs? No         Encounter Diagnosis   Name Primary?    Suspected UTI Yes        Orders Placed This Encounter   Procedures    Urine Culture High Risk           Order Specific Question:   Send normal result to authorizing provider's In Basket if patient is active on MyChart:     Answer:   Yes    Urinalysis           Order Specific Question:   Collection Type     Answer:   Urine, Clean Catch            No follow-ups on file.      E-Visit Time Tracking:

## 2025-01-17 ENCOUNTER — TELEPHONE (OUTPATIENT)
Dept: OBSTETRICS AND GYNECOLOGY | Facility: CLINIC | Age: 45
End: 2025-01-17
Payer: COMMERCIAL

## 2025-01-17 DIAGNOSIS — R39.89 SUSPECTED UTI: Primary | ICD-10-CM

## 2025-01-30 ENCOUNTER — LAB VISIT (OUTPATIENT)
Dept: LAB | Facility: HOSPITAL | Age: 45
End: 2025-01-30
Attending: STUDENT IN AN ORGANIZED HEALTH CARE EDUCATION/TRAINING PROGRAM
Payer: COMMERCIAL

## 2025-01-30 ENCOUNTER — OFFICE VISIT (OUTPATIENT)
Dept: PRIMARY CARE CLINIC | Facility: CLINIC | Age: 45
End: 2025-01-30
Payer: COMMERCIAL

## 2025-01-30 VITALS
HEART RATE: 109 BPM | DIASTOLIC BLOOD PRESSURE: 88 MMHG | OXYGEN SATURATION: 98 % | BODY MASS INDEX: 38.56 KG/M2 | WEIGHT: 231.69 LBS | SYSTOLIC BLOOD PRESSURE: 150 MMHG

## 2025-01-30 DIAGNOSIS — R63.5 WEIGHT GAIN: ICD-10-CM

## 2025-01-30 DIAGNOSIS — G43.709 CHRONIC MIGRAINE WITHOUT AURA WITHOUT STATUS MIGRAINOSUS, NOT INTRACTABLE: ICD-10-CM

## 2025-01-30 DIAGNOSIS — R39.89 SUSPECTED UTI: ICD-10-CM

## 2025-01-30 DIAGNOSIS — F41.1 GENERALIZED ANXIETY DISORDER: ICD-10-CM

## 2025-01-30 DIAGNOSIS — Z13.220 ENCOUNTER FOR LIPID SCREENING FOR CARDIOVASCULAR DISEASE: ICD-10-CM

## 2025-01-30 DIAGNOSIS — Z72.0 TOBACCO USE: ICD-10-CM

## 2025-01-30 DIAGNOSIS — Z13.1 SCREENING FOR DIABETES MELLITUS: ICD-10-CM

## 2025-01-30 DIAGNOSIS — J01.00 ACUTE NON-RECURRENT MAXILLARY SINUSITIS: ICD-10-CM

## 2025-01-30 DIAGNOSIS — Z12.31 ENCOUNTER FOR SCREENING MAMMOGRAM FOR MALIGNANT NEOPLASM OF BREAST: ICD-10-CM

## 2025-01-30 DIAGNOSIS — Z00.00 ANNUAL PHYSICAL EXAM: Primary | ICD-10-CM

## 2025-01-30 DIAGNOSIS — I10 ESSENTIAL HYPERTENSION: ICD-10-CM

## 2025-01-30 DIAGNOSIS — Z13.6 ENCOUNTER FOR LIPID SCREENING FOR CARDIOVASCULAR DISEASE: ICD-10-CM

## 2025-01-30 PROCEDURE — 87088 URINE BACTERIA CULTURE: CPT | Performed by: STUDENT IN AN ORGANIZED HEALTH CARE EDUCATION/TRAINING PROGRAM

## 2025-01-30 PROCEDURE — 99999 PR PBB SHADOW E&M-EST. PATIENT-LVL III: CPT | Mod: PBBFAC,,, | Performed by: NURSE PRACTITIONER

## 2025-01-30 PROCEDURE — 3044F HG A1C LEVEL LT 7.0%: CPT | Mod: CPTII,S$GLB,, | Performed by: NURSE PRACTITIONER

## 2025-01-30 PROCEDURE — 87086 URINE CULTURE/COLONY COUNT: CPT | Performed by: STUDENT IN AN ORGANIZED HEALTH CARE EDUCATION/TRAINING PROGRAM

## 2025-01-30 PROCEDURE — 1160F RVW MEDS BY RX/DR IN RCRD: CPT | Mod: CPTII,S$GLB,, | Performed by: NURSE PRACTITIONER

## 2025-01-30 PROCEDURE — 87186 SC STD MICRODIL/AGAR DIL: CPT | Performed by: STUDENT IN AN ORGANIZED HEALTH CARE EDUCATION/TRAINING PROGRAM

## 2025-01-30 PROCEDURE — 4010F ACE/ARB THERAPY RXD/TAKEN: CPT | Mod: CPTII,S$GLB,, | Performed by: NURSE PRACTITIONER

## 2025-01-30 PROCEDURE — 3079F DIAST BP 80-89 MM HG: CPT | Mod: CPTII,S$GLB,, | Performed by: NURSE PRACTITIONER

## 2025-01-30 PROCEDURE — 3077F SYST BP >= 140 MM HG: CPT | Mod: CPTII,S$GLB,, | Performed by: NURSE PRACTITIONER

## 2025-01-30 PROCEDURE — 99396 PREV VISIT EST AGE 40-64: CPT | Mod: S$GLB,,, | Performed by: NURSE PRACTITIONER

## 2025-01-30 PROCEDURE — 3008F BODY MASS INDEX DOCD: CPT | Mod: CPTII,S$GLB,, | Performed by: NURSE PRACTITIONER

## 2025-01-30 PROCEDURE — 1159F MED LIST DOCD IN RCRD: CPT | Mod: CPTII,S$GLB,, | Performed by: NURSE PRACTITIONER

## 2025-01-30 RX ORDER — LOSARTAN POTASSIUM 50 MG/1
50 TABLET ORAL DAILY
Qty: 90 TABLET | Refills: 3 | Status: SHIPPED | OUTPATIENT
Start: 2025-01-30 | End: 2026-01-30

## 2025-01-30 RX ORDER — METHYLPREDNISOLONE 4 MG/1
TABLET ORAL
Qty: 21 EACH | Refills: 0 | Status: SHIPPED | OUTPATIENT
Start: 2025-01-30 | End: 2025-02-20

## 2025-01-30 RX ORDER — SEMAGLUTIDE 0.25 MG/.5ML
0.25 INJECTION, SOLUTION SUBCUTANEOUS
Qty: 0.5 ML | Refills: 3 | Status: SHIPPED | OUTPATIENT
Start: 2025-01-30

## 2025-01-30 RX ORDER — AZITHROMYCIN 250 MG/1
TABLET, FILM COATED ORAL
Qty: 6 TABLET | Refills: 0 | Status: SHIPPED | OUTPATIENT
Start: 2025-01-30 | End: 2025-02-04

## 2025-01-30 NOTE — PROGRESS NOTES
[facility] Note    Chief Complaint      Chief Complaint   Patient presents with    Follow-up     History of Present Illness      Missy Fernandez is a 44 y.o. female patient with chronic conditions of SAUL, HTN who presents today for annual.  Concerns of weight gain, and her BP    Labs- ordered  Eye exams  Gyn- jenniffer  Mammo-3/2024, ordered    Vaccines:   Covid x2  Tdap 2018  History of Present Illness    CHIEF COMPLAINT:  Missy presents today for headache since Saturday    SINUS SYMPTOMS:  She reports sinus headache since Saturday with associated nasal symptoms including nasal itching with milky white discharge. Fluid is present bilaterally in the sinuses.    HYPERTENSION:  She is currently taking hydrochlorothiazide for blood pressure management. She monitors blood pressure at home with a wrist monitor. She follows a low-sodium diet, avoiding added salt in foods and prepares meals with salt-free seasoning. Her typical protein choices include turkey and fresh chicken.    WEIGHT MANAGEMENT:  She expresses a strong desire to lose weight due to its impact on her blood pressure fluctuations and back discomfort. A previous attempt to start Wegovy was unsuccessful due to pharmacy stock issues and insurance denial. She has since switched insurance providers with coverage for weight loss treatments.    PREVENTIVE CARE:  Her last mammogram was completed in March of last year. She typically receives annual flu vaccination but has not received one for the current season.      ROS:  General: -weight loss  ENT: +nasal congestion  Neurological: +headache         Health Maintenance   Topic Date Due    Pneumococcal Vaccines (Age 0-49) (1 of 2 - PCV) Never done    COVID-19 Vaccine (3 - 2024-25 season) 09/01/2024    Mammogram  03/19/2025    Cervical Cancer Screening  09/21/2026    Hemoglobin A1c (Diabetic Prevention Screening)  01/31/2028    TETANUS VACCINE  09/25/2028    RSV Vaccine (Age 60+ and Pregnant patients) (1  - 1-dose 75+ series) 10/08/2055    Hepatitis C Screening  Completed    HIV Screening  Completed    Lipid Panel  Completed    Influenza Vaccine  Addressed       Past Medical History:   Diagnosis Date    Anxiety     Depression     Severe pre-eclampsia in third trimester 10/30/2018    Tobacco use     Vaginal delivery 10/31/2018     after induction at 36 weeks due to pre-eclampsia with severe features.       Past Surgical History:   Procedure Laterality Date    PELVIC LAPAROSCOPY      Age 15 and Age 25    VAGINAL DELIVERY  , ,        family history includes Appendicitis in her paternal grandmother; Breast cancer in her maternal grandmother; Depression in her sister; Diabetes in her father; Heart disease in her father; Hypertension in her father; Kidney disease in her maternal grandfather; No Known Problems in her mother and sister.    Social History     Tobacco Use    Smoking status: Every Day     Types: Vaping with nicotine    Smokeless tobacco: Never   Substance Use Topics    Alcohol use: Not Currently     Comment: rare    Drug use: No       Outpatient Encounter Medications as of 2025   Medication Sig Dispense Refill    [] azithromycin (Z-MELVI) 250 MG tablet Take 2 tablets by mouth on day 1; Take 1 tablet by mouth on days 2-5 6 tablet 0    citalopram (CELEXA) 40 MG tablet Take 1 tablet by mouth once daily 90 tablet 3    hydroCHLOROthiazide (HYDRODIURIL) 12.5 MG Tab Take 1 tablet by mouth once daily (Patient not taking: Reported on 2025) 90 tablet 0    ibuprofen (ADVIL,MOTRIN) 600 MG tablet Take 600 mg by mouth 3 (three) times daily. (Patient not taking: Reported on 2025)      losartan (COZAAR) 50 MG tablet Take 1 tablet (50 mg total) by mouth once daily. 90 tablet 3    medroxyPROGESTERone (DEPO-PROVERA) 150 mg/mL Syrg Inject 1 mL (150 mg total) into the muscle every 3 (three) months. Ensure negative pregnancy test prior to injection. 1 mL 4    [] methylPREDNISolone (MEDROL  DOSEPACK) 4 mg tablet use as directed (Patient not taking: Reported on 2/14/2025) 21 each 0    semaglutide, weight loss, (WEGOVY) 0.25 mg/0.5 mL PnIj Inject 0.25 mg into the skin every 7 days. 0.5 mL 3    [DISCONTINUED] losartan (COZAAR) 25 MG tablet Take 1 tablet by mouth once daily 90 tablet 0    [DISCONTINUED] semaglutide, weight loss, (WEGOVY) 0.25 mg/0.5 mL PnIj Inject subcutaneous 0.25 mg every 7 days x 4 weeks, then increase to 0.5 mg every 7 days x 4 weeks. 2 mL 0     No facility-administered encounter medications on file as of 1/30/2025.        Review of patient's allergies indicates:  No Known Allergies    Physical Exam      Vital Signs  Pulse: 109  SpO2: 98 %  BP: (!) 150/88  BP Location: Right arm  Height and Weight  Weight: 105.1 kg (231 lb 11.3 oz)    Physical Exam  Vitals and nursing note reviewed.   Constitutional:       General: She is not in acute distress.     Appearance: Normal appearance. She is well-developed. She is not ill-appearing.   HENT:      Head: Normocephalic and atraumatic.      Right Ear: External ear normal.      Left Ear: External ear normal.   Eyes:      Conjunctiva/sclera: Conjunctivae normal.      Pupils: Pupils are equal, round, and reactive to light.   Neck:      Thyroid: No thyromegaly.      Vascular: No JVD.      Trachea: No tracheal deviation.   Cardiovascular:      Rate and Rhythm: Normal rate and regular rhythm.      Heart sounds: Normal heart sounds. No murmur heard.  Pulmonary:      Effort: Pulmonary effort is normal.      Breath sounds: Normal breath sounds.   Chest:      Chest wall: No tenderness.   Abdominal:      General: Bowel sounds are normal.      Palpations: Abdomen is soft.      Tenderness: There is no abdominal tenderness. There is no guarding.   Musculoskeletal:         General: Normal range of motion.      Cervical back: Normal range of motion and neck supple.   Lymphadenopathy:      Cervical: No cervical adenopathy.   Skin:     General: Skin is warm and  dry.   Neurological:      General: No focal deficit present.      Mental Status: She is alert and oriented to person, place, and time.   Psychiatric:         Mood and Affect: Mood normal.         Behavior: Behavior normal.         Thought Content: Thought content normal.         Judgment: Judgment normal.          Laboratory:  CBC:  Lab Results   Component Value Date    WBC 6.14 01/31/2025    RBC 4.88 01/31/2025    HGB 14.5 01/31/2025    HCT 45.4 01/31/2025     01/31/2025    MCV 93 01/31/2025    MCH 29.7 01/31/2025    MCHC 31.9 (L) 01/31/2025    MCHC 31.6 (L) 05/24/2023    MCHC 32.2 11/01/2018     CMP:  Lab Results   Component Value Date    GLU 93 01/31/2025    CALCIUM 8.8 01/31/2025    ALBUMIN 3.9 01/31/2025    PROT 7.4 01/31/2025     01/31/2025    K 3.7 01/31/2025    CO2 21 (L) 01/31/2025     01/31/2025    BUN 11 01/31/2025    ALKPHOS 72 01/31/2025    ALT 14 01/31/2025    AST 13 01/31/2025    BILITOT 0.5 01/31/2025    BILITOT 0.4 05/24/2023    BILITOT 0.3 10/30/2018     URINALYSIS:  Lab Results   Component Value Date    COLORU YELLOW 06/07/2004    SPECGRAV 1.019 06/07/2004    PHUR 6.0 06/07/2004    PROTEINUA NEG 06/07/2004    NITRITE NEG 06/07/2004    LEUKOCYTESUR NEG 06/07/2004    UROBILINOGEN 1 06/07/2004      LIPIDS:  Lab Results   Component Value Date    TSH 1.702 03/08/2024    TSH 1.250 05/24/2023    TSH 1.201 06/15/2018    HDL 35 (L) 01/31/2025    HDL 38 (L) 05/24/2023    CHOL 178 01/31/2025    CHOL 186 05/24/2023    TRIG 144 01/31/2025    TRIG 123 05/24/2023    LDLCALC 114.2 01/31/2025    LDLCALC 123.4 05/24/2023    CHOLHDL 19.7 (L) 01/31/2025    CHOLHDL 20.4 05/24/2023    NONHDLCHOL 143 01/31/2025    NONHDLCHOL 148 05/24/2023    TOTALCHOLEST 5.1 (H) 01/31/2025    TOTALCHOLEST 4.9 05/24/2023     TSH:  Lab Results   Component Value Date    TSH 1.702 03/08/2024    TSH 1.250 05/24/2023    TSH 1.201 06/15/2018     A1C:  Lab Results   Component Value Date    HGBA1C 5.5 01/31/2025    HGBA1C 5.6  05/24/2023         Assessment/Plan     Missy Fernandez is a 44 y.o.female with:    Assessment & Plan    IMPRESSION:  - Assessed patient's sinus symptoms and hypertension  - Evaluated weight loss goals and decided to retry Wegovy, considering patient's hypertension as a qualifying factor  - Planned to increase Losartan dosage to address elevated blood pressure    HYPERTENSION:  - Increased Losartan dosage for blood pressure management.  - Continued Hydrochlorothiazide.  - Evaluated the patient's elevated blood pressure of 158/88.  - Advised the patient on lifestyle modifications to manage blood pressure.  - Instructed the patient to monitor blood pressure at home and send readings to assess medication efficacy.  - Scheduled follow up in 2 weeks for blood pressure check and medication review.  - Planned to adjust medication as the patient loses weight.  - Advised on sodium reduction in diet, including avoiding elevated-sodium foods like lunch meats and frozen meals.  - Provided dietary advice for blood pressure management.  - Missy to reduce sodium intake in diet.  - Recommend using no-salt seasonings.    WEIGHT MANAGEMENT:  - Prescribed Wegovy for weight loss, which may help manage diabetes.  - Instructed to administer 1 shot per week, preferably in the evening.  - Planned to increase dose monthly based on tolerability and weight loss progress.  - Requested the patient to send messages updating on Wegovy tolerability and effectiveness before each monthly refill.  - Advised to contact the office if experiencing nausea with Wegovy for potential Zofran prescription.  - Prescribed Wegovy for weight loss, which may indirectly help with back pain.  - Explained potential side effects of Wegovy, including bloating, gas, and constipation.  - Discussed importance of hydration and increased fiber intake while on Wegovy.  - Missy to chew food thoroughly when starting Wegovy.  - Missy to increase fiber intake through diet or  supplements (e.g., Miralax, Metamucil).    SINUS INFECTION:  - Examined the patient and found fluid on both sides of sinuses and throat irritation.  - Assessed the condition as sinus-related based on reported headache and nasal symptoms since Saturday.  - Prescribed Z-Calos (azithromycin) for sinus infection.  - Prescribed Medrol Dosepak for sinus infection.    BACK PAIN:  - Noted patient's mention of back pain related to excess weight.    ELEVATED HEART RATE:  - Noted elevated heart rate.  - Evaluated possible causes of elevated heart rate.    FAMILY HISTORY OF HEART DISEASE:  - Noted patient's family history of heart disease.    MAMMOGRAM:  - Confirmed recent mammogram in March of previous year.    FLU VACCINATION:  - Discussed flu vaccination with patient.  - Provided prescription for flu vaccine.    LABS:  - Ordered annual lab work including diabetes screening (fasting for 8 hours required).  - Annual lab work ordered including complete blood count, comprehensive metabolic panel, lipid panel (fasting for 8 hours required).    OTHER INSTRUCTIONS:  - Missy to increase water intake.  - Educated on the importance of regular health screenings, including mammograms and future colonoscopy.         Annual physical exam  -     losartan (COZAAR) 50 MG tablet; Take 1 tablet (50 mg total) by mouth once daily.  Dispense: 90 tablet; Refill: 3  -     CBC Auto Differential; Future; Expected date: 01/30/2025  -     Comprehensive Metabolic Panel; Future; Expected date: 01/30/2025  -     Hemoglobin A1C; Future; Expected date: 01/30/2025  -     Lipid Panel; Future; Expected date: 01/30/2025    Essential hypertension  -     semaglutide, weight loss, (WEGOVY) 0.25 mg/0.5 mL PnIj; Inject 0.25 mg into the skin every 7 days.  Dispense: 0.5 mL; Refill: 3  -     losartan (COZAAR) 50 MG tablet; Take 1 tablet (50 mg total) by mouth once daily.  Dispense: 90 tablet; Refill: 3  -     CBC Auto Differential; Future; Expected date: 01/30/2025  -      Comprehensive Metabolic Panel; Future; Expected date: 01/30/2025  -     Hemoglobin A1C; Future; Expected date: 01/30/2025  -     Lipid Panel; Future; Expected date: 01/30/2025    Acute non-recurrent maxillary sinusitis  -     azithromycin (Z-MELVI) 250 MG tablet; Take 2 tablets by mouth on day 1; Take 1 tablet by mouth on days 2-5  Dispense: 6 tablet; Refill: 0  -     methylPREDNISolone (MEDROL DOSEPACK) 4 mg tablet; use as directed (Patient not taking: Reported on 2/14/2025)  Dispense: 21 each; Refill: 0    Generalized anxiety disorder  -     semaglutide, weight loss, (WEGOVY) 0.25 mg/0.5 mL PnIj; Inject 0.25 mg into the skin every 7 days.  Dispense: 0.5 mL; Refill: 3  -     losartan (COZAAR) 50 MG tablet; Take 1 tablet (50 mg total) by mouth once daily.  Dispense: 90 tablet; Refill: 3  -     CBC Auto Differential; Future; Expected date: 01/30/2025  -     Comprehensive Metabolic Panel; Future; Expected date: 01/30/2025  -     Hemoglobin A1C; Future; Expected date: 01/30/2025  -     Lipid Panel; Future; Expected date: 01/30/2025    Tobacco use    Weight gain  -     semaglutide, weight loss, (WEGOVY) 0.25 mg/0.5 mL PnIj; Inject 0.25 mg into the skin every 7 days.  Dispense: 0.5 mL; Refill: 3  -     losartan (COZAAR) 50 MG tablet; Take 1 tablet (50 mg total) by mouth once daily.  Dispense: 90 tablet; Refill: 3  -     CBC Auto Differential; Future; Expected date: 01/30/2025  -     Comprehensive Metabolic Panel; Future; Expected date: 01/30/2025  -     Hemoglobin A1C; Future; Expected date: 01/30/2025  -     Lipid Panel; Future; Expected date: 01/30/2025    BMI 38.0-38.9,adult  -     semaglutide, weight loss, (WEGOVY) 0.25 mg/0.5 mL PnIj; Inject 0.25 mg into the skin every 7 days.  Dispense: 0.5 mL; Refill: 3  -     losartan (COZAAR) 50 MG tablet; Take 1 tablet (50 mg total) by mouth once daily.  Dispense: 90 tablet; Refill: 3  -     CBC Auto Differential; Future; Expected date: 01/30/2025  -     Comprehensive Metabolic  Panel; Future; Expected date: 01/30/2025  -     Hemoglobin A1C; Future; Expected date: 01/30/2025  -     Lipid Panel; Future; Expected date: 01/30/2025    Chronic migraine without aura without status migrainosus, not intractable  -     losartan (COZAAR) 50 MG tablet; Take 1 tablet (50 mg total) by mouth once daily.  Dispense: 90 tablet; Refill: 3  -     CBC Auto Differential; Future; Expected date: 01/30/2025  -     Comprehensive Metabolic Panel; Future; Expected date: 01/30/2025  -     Hemoglobin A1C; Future; Expected date: 01/30/2025  -     Lipid Panel; Future; Expected date: 01/30/2025    Screening for diabetes mellitus  -     losartan (COZAAR) 50 MG tablet; Take 1 tablet (50 mg total) by mouth once daily.  Dispense: 90 tablet; Refill: 3  -     CBC Auto Differential; Future; Expected date: 01/30/2025  -     Comprehensive Metabolic Panel; Future; Expected date: 01/30/2025  -     Hemoglobin A1C; Future; Expected date: 01/30/2025  -     Lipid Panel; Future; Expected date: 01/30/2025    Encounter for lipid screening for cardiovascular disease  -     losartan (COZAAR) 50 MG tablet; Take 1 tablet (50 mg total) by mouth once daily.  Dispense: 90 tablet; Refill: 3  -     CBC Auto Differential; Future; Expected date: 01/30/2025  -     Comprehensive Metabolic Panel; Future; Expected date: 01/30/2025  -     Hemoglobin A1C; Future; Expected date: 01/30/2025  -     Lipid Panel; Future; Expected date: 01/30/2025    Encounter for screening mammogram for malignant neoplasm of breast  -     Mammo Digital Screening Bilat w/ Jesús (XPD); Future; Expected date: 03/08/2025          Health Maintenance Due   Topic Date Due    Pneumococcal Vaccines (Age 0-49) (1 of 2 - PCV) Never done    COVID-19 Vaccine (3 - 2024-25 season) 09/01/2024    Mammogram  03/19/2025        I spent 35 minutes on the day of this encounter for preparing for, evaluating, treating, and managing this patient.      -Continue current medications and maintain follow up  with specialists.  Return to clinic in 1 yearas needed for any concerns   No follow-ups on file.     This note was generated with the assistance of ambient listening technology. Verbal consent was obtained by the patient and accompanying visitor(s) for the recording of patient appointment to facilitate this note. I attest to having reviewed and edited the generated note for accuracy, though some syntax or spelling errors may persist. Please contact the author of this note for any clarification.        ANT McfarlandC  [facility]

## 2025-01-31 ENCOUNTER — LAB VISIT (OUTPATIENT)
Dept: LAB | Facility: HOSPITAL | Age: 45
End: 2025-01-31
Attending: NURSE PRACTITIONER
Payer: COMMERCIAL

## 2025-01-31 DIAGNOSIS — Z00.00 ANNUAL PHYSICAL EXAM: ICD-10-CM

## 2025-01-31 DIAGNOSIS — R63.5 WEIGHT GAIN: ICD-10-CM

## 2025-01-31 DIAGNOSIS — I10 ESSENTIAL HYPERTENSION: ICD-10-CM

## 2025-01-31 DIAGNOSIS — Z13.6 ENCOUNTER FOR LIPID SCREENING FOR CARDIOVASCULAR DISEASE: ICD-10-CM

## 2025-01-31 DIAGNOSIS — Z13.1 SCREENING FOR DIABETES MELLITUS: ICD-10-CM

## 2025-01-31 DIAGNOSIS — Z13.220 ENCOUNTER FOR LIPID SCREENING FOR CARDIOVASCULAR DISEASE: ICD-10-CM

## 2025-01-31 DIAGNOSIS — G43.709 CHRONIC MIGRAINE WITHOUT AURA WITHOUT STATUS MIGRAINOSUS, NOT INTRACTABLE: ICD-10-CM

## 2025-01-31 DIAGNOSIS — I10 PRIMARY HYPERTENSION: ICD-10-CM

## 2025-01-31 DIAGNOSIS — F41.1 GENERALIZED ANXIETY DISORDER: ICD-10-CM

## 2025-01-31 LAB
ALBUMIN SERPL BCP-MCNC: 3.9 G/DL (ref 3.5–5.2)
ALP SERPL-CCNC: 72 U/L (ref 40–150)
ALT SERPL W/O P-5'-P-CCNC: 14 U/L (ref 10–44)
ANION GAP SERPL CALC-SCNC: 9 MMOL/L (ref 8–16)
AST SERPL-CCNC: 13 U/L (ref 10–40)
BASOPHILS # BLD AUTO: 0.03 K/UL (ref 0–0.2)
BASOPHILS NFR BLD: 0.5 % (ref 0–1.9)
BILIRUB SERPL-MCNC: 0.5 MG/DL (ref 0.1–1)
BUN SERPL-MCNC: 11 MG/DL (ref 6–20)
CALCIUM SERPL-MCNC: 8.8 MG/DL (ref 8.7–10.5)
CHLORIDE SERPL-SCNC: 107 MMOL/L (ref 95–110)
CHOLEST SERPL-MCNC: 178 MG/DL (ref 120–199)
CHOLEST/HDLC SERPL: 5.1 {RATIO} (ref 2–5)
CO2 SERPL-SCNC: 21 MMOL/L (ref 23–29)
CREAT SERPL-MCNC: 0.9 MG/DL (ref 0.5–1.4)
DIFFERENTIAL METHOD BLD: ABNORMAL
EOSINOPHIL # BLD AUTO: 0.1 K/UL (ref 0–0.5)
EOSINOPHIL NFR BLD: 1.3 % (ref 0–8)
ERYTHROCYTE [DISTWIDTH] IN BLOOD BY AUTOMATED COUNT: 13.2 % (ref 11.5–14.5)
EST. GFR  (NO RACE VARIABLE): >60 ML/MIN/1.73 M^2
ESTIMATED AVG GLUCOSE: 111 MG/DL (ref 68–131)
GLUCOSE SERPL-MCNC: 93 MG/DL (ref 70–110)
HBA1C MFR BLD: 5.5 % (ref 4–5.6)
HCT VFR BLD AUTO: 45.4 % (ref 37–48.5)
HDLC SERPL-MCNC: 35 MG/DL (ref 40–75)
HDLC SERPL: 19.7 % (ref 20–50)
HGB BLD-MCNC: 14.5 G/DL (ref 12–16)
IMM GRANULOCYTES # BLD AUTO: 0.01 K/UL (ref 0–0.04)
IMM GRANULOCYTES NFR BLD AUTO: 0.2 % (ref 0–0.5)
LDLC SERPL CALC-MCNC: 114.2 MG/DL (ref 63–159)
LYMPHOCYTES # BLD AUTO: 1.8 K/UL (ref 1–4.8)
LYMPHOCYTES NFR BLD: 28.5 % (ref 18–48)
MCH RBC QN AUTO: 29.7 PG (ref 27–31)
MCHC RBC AUTO-ENTMCNC: 31.9 G/DL (ref 32–36)
MCV RBC AUTO: 93 FL (ref 82–98)
MONOCYTES # BLD AUTO: 0.5 K/UL (ref 0.3–1)
MONOCYTES NFR BLD: 8.8 % (ref 4–15)
NEUTROPHILS # BLD AUTO: 3.7 K/UL (ref 1.8–7.7)
NEUTROPHILS NFR BLD: 60.7 % (ref 38–73)
NONHDLC SERPL-MCNC: 143 MG/DL
NRBC BLD-RTO: 0 /100 WBC
PLATELET # BLD AUTO: 217 K/UL (ref 150–450)
PMV BLD AUTO: 11.8 FL (ref 9.2–12.9)
POTASSIUM SERPL-SCNC: 3.7 MMOL/L (ref 3.5–5.1)
PROT SERPL-MCNC: 7.4 G/DL (ref 6–8.4)
RBC # BLD AUTO: 4.88 M/UL (ref 4–5.4)
SODIUM SERPL-SCNC: 137 MMOL/L (ref 136–145)
TRIGL SERPL-MCNC: 144 MG/DL (ref 30–150)
WBC # BLD AUTO: 6.14 K/UL (ref 3.9–12.7)

## 2025-01-31 PROCEDURE — 80053 COMPREHEN METABOLIC PANEL: CPT | Performed by: NURSE PRACTITIONER

## 2025-01-31 PROCEDURE — 80061 LIPID PANEL: CPT | Performed by: NURSE PRACTITIONER

## 2025-01-31 PROCEDURE — 83036 HEMOGLOBIN GLYCOSYLATED A1C: CPT | Performed by: NURSE PRACTITIONER

## 2025-01-31 PROCEDURE — 85025 COMPLETE CBC W/AUTO DIFF WBC: CPT | Performed by: NURSE PRACTITIONER

## 2025-02-01 LAB — BACTERIA UR CULT: ABNORMAL

## 2025-02-03 ENCOUNTER — PATIENT MESSAGE (OUTPATIENT)
Dept: PRIMARY CARE CLINIC | Facility: CLINIC | Age: 45
End: 2025-02-03
Payer: COMMERCIAL

## 2025-02-03 DIAGNOSIS — N30.90 CYSTITIS: Primary | ICD-10-CM

## 2025-02-03 RX ORDER — NITROFURANTOIN 25; 75 MG/1; MG/1
100 CAPSULE ORAL 2 TIMES DAILY
Qty: 14 CAPSULE | Refills: 0 | Status: SHIPPED | OUTPATIENT
Start: 2025-02-03 | End: 2025-02-10

## 2025-02-03 NOTE — TELEPHONE ENCOUNTER
LOV with Deepthi Carver NP , 1/30/2025    Patient having swelling in hands /94 pulse 91    NOV with Deepthi Carver NP 2/14/25  Unable to find any appointment sooner

## 2025-02-04 ENCOUNTER — PATIENT MESSAGE (OUTPATIENT)
Dept: PRIMARY CARE CLINIC | Facility: CLINIC | Age: 45
End: 2025-02-04
Payer: COMMERCIAL

## 2025-02-14 ENCOUNTER — OFFICE VISIT (OUTPATIENT)
Dept: PRIMARY CARE CLINIC | Facility: CLINIC | Age: 45
End: 2025-02-14
Payer: COMMERCIAL

## 2025-02-14 ENCOUNTER — TELEPHONE (OUTPATIENT)
Dept: PRIMARY CARE CLINIC | Facility: CLINIC | Age: 45
End: 2025-02-14

## 2025-02-14 VITALS
SYSTOLIC BLOOD PRESSURE: 118 MMHG | WEIGHT: 232.13 LBS | BODY MASS INDEX: 38.67 KG/M2 | RESPIRATION RATE: 16 BRPM | OXYGEN SATURATION: 98 % | HEIGHT: 65 IN | DIASTOLIC BLOOD PRESSURE: 70 MMHG | HEART RATE: 115 BPM

## 2025-02-14 DIAGNOSIS — F41.1 GENERALIZED ANXIETY DISORDER: ICD-10-CM

## 2025-02-14 DIAGNOSIS — I10 ESSENTIAL HYPERTENSION: Primary | ICD-10-CM

## 2025-02-14 DIAGNOSIS — Z72.0 TOBACCO USE: ICD-10-CM

## 2025-02-14 PROCEDURE — 99999 PR PBB SHADOW E&M-EST. PATIENT-LVL III: CPT | Mod: PBBFAC,,, | Performed by: NURSE PRACTITIONER

## 2025-02-14 NOTE — PROGRESS NOTES
[facility] Note    Chief Complaint      Chief Complaint   Patient presents with    Follow-up     History of Present Illness      Missy Fernandez is a 44 y.o. female patient with chronic conditions of hypertension who presents today for blood pressure follow-up.  Patient currently on 50 mg of losartan, last appointment /88    Also started patient on semaglutide  History of Present Illness    CHIEF COMPLAINT:  Missy presents today for follow up.    BLOOD PRESSURE:  She reports improvement in blood pressure following discontinuation of steroid medication, which she attributes as the cause of previous elevation and associated symptoms.    INSURANCE:  Prior authorization is pending for [place] prescription.         Health Maintenance   Topic Date Due    Pneumococcal Vaccines (Age 0-49) (1 of 2 - PCV) Never done    Influenza Vaccine (1) 2024    COVID-19 Vaccine (3 - - season) 2024    Mammogram  2025    Cervical Cancer Screening  2026    Hemoglobin A1c (Diabetic Prevention Screening)  2028    TETANUS VACCINE  2028    RSV Vaccine (Age 60+ and Pregnant patients) (1 - 1-dose 75+ series) 10/08/2055    Hepatitis C Screening  Completed    HIV Screening  Completed    Lipid Panel  Completed       Past Medical History:   Diagnosis Date    Anxiety     Depression     Severe pre-eclampsia in third trimester 10/30/2018    Tobacco use     Vaginal delivery 10/31/2018     after induction at 36 weeks due to pre-eclampsia with severe features.       Past Surgical History:   Procedure Laterality Date    PELVIC LAPAROSCOPY      Age 15 and Age 25    VAGINAL DELIVERY  , ,        family history includes Appendicitis in her paternal grandmother; Breast cancer in her maternal grandmother; Depression in her sister; Diabetes in her father; Heart disease in her father; Hypertension in her father; Kidney disease in her maternal grandfather; No Known Problems in her mother and  "sister.    Social History     Tobacco Use    Smoking status: Every Day     Types: Vaping with nicotine    Smokeless tobacco: Never   Substance Use Topics    Alcohol use: Not Currently     Comment: rare    Drug use: No       Outpatient Encounter Medications as of 2025   Medication Sig Dispense Refill    citalopram (CELEXA) 40 MG tablet Take 1 tablet by mouth once daily 90 tablet 3    losartan (COZAAR) 50 MG tablet Take 1 tablet (50 mg total) by mouth once daily. 90 tablet 3    medroxyPROGESTERone (DEPO-PROVERA) 150 mg/mL Syrg Inject 1 mL (150 mg total) into the muscle every 3 (three) months. Ensure negative pregnancy test prior to injection. 1 mL 4    semaglutide, weight loss, (WEGOVY) 0.25 mg/0.5 mL PnIj Inject 0.25 mg into the skin every 7 days. 0.5 mL 3    [] azithromycin (Z-MELVI) 250 MG tablet Take 2 tablets by mouth on day 1; Take 1 tablet by mouth on days 2-5 6 tablet 0    hydroCHLOROthiazide (HYDRODIURIL) 12.5 MG Tab Take 1 tablet by mouth once daily (Patient not taking: Reported on 2025) 90 tablet 0    ibuprofen (ADVIL,MOTRIN) 600 MG tablet Take 600 mg by mouth 3 (three) times daily. (Patient not taking: Reported on 2025)      methylPREDNISolone (MEDROL DOSEPACK) 4 mg tablet use as directed (Patient not taking: Reported on 2025) 21 each 0    [] nitrofurantoin, macrocrystal-monohydrate, (MACROBID) 100 MG capsule Take 1 capsule (100 mg total) by mouth 2 (two) times daily. for 7 days 14 capsule 0     No facility-administered encounter medications on file as of 2025.        Review of patient's allergies indicates:  No Known Allergies    Physical Exam      Vital Signs  Pulse: (!) 115  Resp: 16  SpO2: 98 %  BP: 118/70  BP Location: Right arm  Patient Position: Sitting  Height and Weight  Height: 5' 5" (165.1 cm)  Weight: 105.3 kg (232 lb 2.3 oz)  BSA (Calculated - sq m): 2.2 sq meters  BMI (Calculated): 38.6  Weight in (lb) to have BMI = 25: 149.9    Physical Exam  Vitals and " nursing note reviewed.   Constitutional:       General: She is not in acute distress.     Appearance: Normal appearance. She is well-developed. She is not ill-appearing.   HENT:      Head: Normocephalic and atraumatic.      Right Ear: External ear normal.      Left Ear: External ear normal.   Eyes:      Conjunctiva/sclera: Conjunctivae normal.      Pupils: Pupils are equal, round, and reactive to light.   Neck:      Thyroid: No thyromegaly.      Vascular: No JVD.      Trachea: No tracheal deviation.   Cardiovascular:      Rate and Rhythm: Normal rate and regular rhythm.      Heart sounds: Normal heart sounds. No murmur heard.  Pulmonary:      Effort: Pulmonary effort is normal.      Breath sounds: Normal breath sounds.   Chest:      Chest wall: No tenderness.   Abdominal:      Palpations: Abdomen is soft.      Tenderness: There is no abdominal tenderness. There is no guarding.   Musculoskeletal:         General: Normal range of motion.      Cervical back: Normal range of motion and neck supple.   Lymphadenopathy:      Cervical: No cervical adenopathy.   Skin:     General: Skin is warm and dry.   Neurological:      General: No focal deficit present.      Mental Status: She is alert and oriented to person, place, and time.   Psychiatric:         Mood and Affect: Mood normal.         Behavior: Behavior normal.         Thought Content: Thought content normal.         Judgment: Judgment normal.          Laboratory:  CBC:  Lab Results   Component Value Date    WBC 6.14 01/31/2025    RBC 4.88 01/31/2025    HGB 14.5 01/31/2025    HCT 45.4 01/31/2025     01/31/2025    MCV 93 01/31/2025    MCH 29.7 01/31/2025    MCHC 31.9 (L) 01/31/2025    MCHC 31.6 (L) 05/24/2023    MCHC 32.2 11/01/2018     CMP:  Lab Results   Component Value Date    GLU 93 01/31/2025    CALCIUM 8.8 01/31/2025    ALBUMIN 3.9 01/31/2025    PROT 7.4 01/31/2025     01/31/2025    K 3.7 01/31/2025    CO2 21 (L) 01/31/2025     01/31/2025    BUN  11 01/31/2025    ALKPHOS 72 01/31/2025    ALT 14 01/31/2025    AST 13 01/31/2025    BILITOT 0.5 01/31/2025    BILITOT 0.4 05/24/2023    BILITOT 0.3 10/30/2018     URINALYSIS:  Lab Results   Component Value Date    COLORU YELLOW 06/07/2004    SPECGRAV 1.019 06/07/2004    PHUR 6.0 06/07/2004    PROTEINUA NEG 06/07/2004    NITRITE NEG 06/07/2004    LEUKOCYTESUR NEG 06/07/2004    UROBILINOGEN 1 06/07/2004      LIPIDS:  Lab Results   Component Value Date    TSH 1.702 03/08/2024    TSH 1.250 05/24/2023    TSH 1.201 06/15/2018    HDL 35 (L) 01/31/2025    HDL 38 (L) 05/24/2023    CHOL 178 01/31/2025    CHOL 186 05/24/2023    TRIG 144 01/31/2025    TRIG 123 05/24/2023    LDLCALC 114.2 01/31/2025    LDLCALC 123.4 05/24/2023    CHOLHDL 19.7 (L) 01/31/2025    CHOLHDL 20.4 05/24/2023    NONHDLCHOL 143 01/31/2025    NONHDLCHOL 148 05/24/2023    TOTALCHOLEST 5.1 (H) 01/31/2025    TOTALCHOLEST 4.9 05/24/2023     TSH:  Lab Results   Component Value Date    TSH 1.702 03/08/2024    TSH 1.250 05/24/2023    TSH 1.201 06/15/2018     A1C:  Lab Results   Component Value Date    HGBA1C 5.5 01/31/2025    HGBA1C 5.6 05/24/2023         Assessment/Plan     Missy Fernandez is a 44 y.o.female with:    Assessment & Plan    IMPRESSION:  - Assessed patient's blood pressure, which has improved after discontinuing steroid medication  - Evaluated respiratory status, noting good breath sounds    ELEVATED HEART RATE:  - Noted the patient's heart rate to be elevated during the visit.    BLOOD PRESSURE:  - Measured blood pressure at 118/70, which is within normal range.         Essential hypertension    Generalized anxiety disorder    Tobacco use    BMI 38.0-38.9,adult          Health Maintenance Due   Topic Date Due    Pneumococcal Vaccines (Age 0-49) (1 of 2 - PCV) Never done    Influenza Vaccine (1) 09/01/2024    COVID-19 Vaccine (3 - 2024-25 season) 09/01/2024    Mammogram  03/19/2025        I spent 32 minutes on the day of this encounter for preparing  for, evaluating, treating, and managing this patient.      -Continue current medications and maintain follow up with specialists.  Return to clinic as needed for any concerns No follow-ups on file.     This note was generated with the assistance of ambient listening technology. Verbal consent was obtained by the patient and accompanying visitor(s) for the recording of patient appointment to facilitate this note. I attest to having reviewed and edited the generated note for accuracy, though some syntax or spelling errors may persist. Please contact the author of this note for any clarification.        OBINNA Mcfarland  [facility]

## 2025-02-24 NOTE — PROGRESS NOTES
"  OBSTETRICS AND GYNECOLOGY    Chief Complaint:  Well Woman Exam     HPI:      Missy Fernandez is a 44 y.o.  who presents today for well woman exam.    LMP: No LMP recorded. Patient has had an injection. Specifically, patient denies abnormal vaginal bleeding, abnormal discharge/odor, pelvic pain, or dysuria/hematuria. Ms. Fernandez is currently sexually active with a single male partner. She is currently using Depo-Provera for contraception. She declines STD screening today. Chronic endometriosis pain persists. On DMPA x several years. Helps a little but pain persists. She denies additional issues, problems, or complaints.     OB History          4    Para   4    Term   3       1    AB        Living   3         SAB        IAB        Ectopic        Multiple        Live Births   3           Obstetric Comments   Menarche 14             ROS:     GENERAL: Feeling well overall.   BREASTS: Denies breast skin changes, lumps or nipple discharge.    URINARY: Denies dysuria, hematuria.    Physical Exam:      PHYSICAL EXAM:  /87 (BP Location: Right arm, Patient Position: Sitting)   Ht 5' 5" (1.651 m)   Wt 106.6 kg (235 lb 0.2 oz)   BMI 39.11 kg/m²   Body mass index is 39.11 kg/m².     APPEARANCE:  Well nourished, well developed, in no acute distress.  Able to smile appropriately during our encounter. Makes eye contact. Pleasant.  PSYCH: Appropriate mood and affect.  SKIN:   No acne or hirsutism.  CARDIOVASCULAR:  No edema of peripheral extremities. Well perfused throughout.  RESP:  No accessory muscle use to breathe. Speaking comfortably in complete sentences.   BREASTS:  Symmetrical, with no visible skin lesions or scars, no palpable masses. No nipple discharge or peau d'orange bilaterally. No palpable axillary LAD.  ABDOMEN:  Soft. Nonacute.    PELVIC:  Normal external genitalia without lesions. Normal hair distribution. Adequate perineal body, normal urethral meatus. Vagina moist and well rugated. " Without lesions, without discharge. Cervix 3x4cm, parous. Cervix pink, without ectocervical lesions, discharge or tenderness.  No ectropion. No significant cystocele or rectocele.  Bimanual exam shows uterus to be mobile and nontender.  Adnexa without masses or tenderness. Exam limited.    Female chaperone present.    Assessment/Plan:     Well Woman Exam  -- Counseled patient regarding healthy diet and regular exercise, daily seat belt use.    -- BP reviewed  -- She denies abuse and feels safe at home.  -- Pap smear      -- STD screening:  declines   -- MMG:  schedule, reviewed and due 3/19   -- Labs, reviewed today:  A1c WNL 1/2025  -- Discussed other available options for endometriosis/pain  Vapes with nicotine, age 44  Patient interested in definitive management with surgery  Must consider oophorectomy vs not  Will f/u    Follow up in about 1 year (around 3/14/2026) for WWE.    Counseling:     Patient was counseled today on current ASCCP pap guidelines, the recommendation for yearly physical exams, safe driving habits, and breast self awareness. She is to see her PCP for other health maintenance.     Use of the [employer] Patient Portal discussed and encouraged during today's visit.           As of April 1, 2021, the Cures Act has been passed nationally. This new law requires that all doctors progress notes, lab results, pathology reports and radiology reports be released IMMEDIATELY to the patient in the patient portal. That means that the results are released to you at the EXACT same time they are released to me. Therefore, with all of the patients that I have I am not able to reply to each patient exactly when the results come in. So there will be a delay from when you see the results to when I see them and have time to come up with a response to send you. Also I only see these results when I am on the computer at work. So if the results come in over the weekend or after 5 pm of a work day, I will not see them  until the next business day. As you can tell, this is a challenge as a physician to give every patient the quick response they hope for and deserve. So please be patient!   Thanks for your understanding and patience.

## 2025-03-14 ENCOUNTER — OFFICE VISIT (OUTPATIENT)
Dept: OBSTETRICS AND GYNECOLOGY | Facility: CLINIC | Age: 45
End: 2025-03-14
Payer: COMMERCIAL

## 2025-03-14 VITALS
DIASTOLIC BLOOD PRESSURE: 87 MMHG | HEIGHT: 65 IN | SYSTOLIC BLOOD PRESSURE: 134 MMHG | WEIGHT: 235 LBS | BODY MASS INDEX: 39.15 KG/M2

## 2025-03-14 DIAGNOSIS — Z12.31 ENCOUNTER FOR SCREENING MAMMOGRAM FOR MALIGNANT NEOPLASM OF BREAST: ICD-10-CM

## 2025-03-14 DIAGNOSIS — Z01.419 ENCOUNTER FOR WELL WOMAN EXAM: Primary | ICD-10-CM

## 2025-03-14 DIAGNOSIS — Z11.51 SCREENING FOR HPV (HUMAN PAPILLOMAVIRUS): ICD-10-CM

## 2025-03-14 DIAGNOSIS — Z12.4 SCREENING FOR CERVICAL CANCER: ICD-10-CM

## 2025-03-14 DIAGNOSIS — G43.709 CHRONIC MIGRAINE WITHOUT AURA WITHOUT STATUS MIGRAINOSUS, NOT INTRACTABLE: ICD-10-CM

## 2025-03-14 DIAGNOSIS — I10 PRIMARY HYPERTENSION: ICD-10-CM

## 2025-03-14 PROCEDURE — 99999 PR PBB SHADOW E&M-EST. PATIENT-LVL III: CPT | Mod: PBBFAC,,, | Performed by: STUDENT IN AN ORGANIZED HEALTH CARE EDUCATION/TRAINING PROGRAM

## 2025-03-14 RX ORDER — LOSARTAN POTASSIUM 25 MG/1
25 TABLET ORAL
Qty: 90 TABLET | Refills: 0 | OUTPATIENT
Start: 2025-03-14

## 2025-03-17 ENCOUNTER — TELEPHONE (OUTPATIENT)
Dept: OBSTETRICS AND GYNECOLOGY | Facility: CLINIC | Age: 45
End: 2025-03-17
Payer: COMMERCIAL

## 2025-03-17 NOTE — TELEPHONE ENCOUNTER
Contacted patient Missy Fernandez today, 3/17/2025, at approximately 12:54 PM. Two patient identifiers confirmed.   Discussed hysterectomy for endometriosis treatment option. Patient wants to proceed. Discussed without oophorectomy, vs unilateral vs bilateral oophorectomy, and pros/cons of all of these options. Reviewed hyst will improve endometriosis pain, could also consider unilateral oophorectomy on more painful side; vs bilateral oophorectomy/menopause. To consider. SHM to clinic staff to facilitate pre op apt.    
intact

## 2025-03-18 ENCOUNTER — TELEPHONE (OUTPATIENT)
Dept: OBSTETRICS AND GYNECOLOGY | Facility: CLINIC | Age: 45
End: 2025-03-18
Payer: COMMERCIAL

## 2025-03-18 NOTE — TELEPHONE ENCOUNTER
----- Message from Maya Madrid MD sent at 3/17/2025 12:56 PM CDT -----  Please schedule pre op apt for hyst. Unsure surgery date yet. Anytime in next 2-5 weeks is fine, but do please allow for a 30 minute spot! Thank you!

## 2025-03-20 ENCOUNTER — RESULTS FOLLOW-UP (OUTPATIENT)
Dept: OBSTETRICS AND GYNECOLOGY | Facility: CLINIC | Age: 45
End: 2025-03-20

## 2025-03-25 NOTE — PROGRESS NOTES
Chief Complaint:  Pre-Operative Visit for Hysterectomy    History of Present Illness    Missy Fernandez is a 44 y.o.  here for preop visit. Presents with SO.    Pre-operative optimization was needed.    No LMP recorded (lmp unknown). Patient has had an injection. She is currently using Depo-Provera for contraception.    Past Medical History:   Diagnosis Date    Anxiety     Depression     Severe pre-eclampsia in third trimester 10/30/2018    Tobacco use     Vaginal delivery 10/31/2018     after induction at 36 weeks due to pre-eclampsia with severe features.       Past Surgical History:   Procedure Laterality Date    PELVIC LAPAROSCOPY      Age 15 and Age 25    VAGINAL DELIVERY  , ,        Family History   Problem Relation Name Age of Onset    No Known Problems Mother      Heart disease Father Kt Valdez     Hypertension Father Kt Valdez     Diabetes Father Kt Valdez     No Known Problems Sister      Depression Sister She Valdez     Breast cancer Maternal Grandmother          each breast at different times    Kidney disease Maternal Grandfather      Appendicitis Paternal Grandmother      Ovarian cancer Neg Hx      Colon cancer Neg Hx         Tobacco Use History[1]    Social History     Substance and Sexual Activity   Sexual Activity Yes    Partners: Male    Birth control/protection: Injection, None    Comment: :  Sonny        OB History    Para Term  AB Living   4 4 3 1  3   SAB IAB Ectopic Multiple Live Births       3      # Outcome Date GA Lbr Henry/2nd Weight Sex Type Anes PTL Lv   4  10/31/18 36w5d / 00:20 2.82 kg (6 lb 3.5 oz) M Vag-Spont EPI Y AMILCAR   3 Term  40w0d  3.629 kg (8 lb) M Vag-Spont   AMILCAR      Birth Comments: pp preEclampsia admit for Magnesium sulfate   2 Term  40w0d  3.09 kg (6 lb 13 oz) F Vag-Spont   AMILCAR   1 Term               Obstetric Comments   Menarche 14       Review of Systems  GENERAL:  Feeling well overall.   SKIN: Denies  "rash.   HEENT: Denies vision changes.   CARDIOVASCULAR: Denies chest pain.   RESPIRATORY: Denies shortness of breath.  BREASTS: Denies pain, lumps.   ABDOMEN: Denies constipation, diarrhea, vomiting.  URINARY: Denies dysuria, hematuria.  NEUROLOGIC: Denies syncope or weakness.       Objective:     /82 (BP Location: Left arm, Patient Position: Sitting)   Ht 5' 5" (1.651 m)   Wt 106.1 kg (234 lb 0.3 oz)   LMP  (LMP Unknown)   BMI 38.94 kg/m²     Body mass index is 38.94 kg/m².    APPEARANCE: Well nourished, well developed, in no acute distress.  PSYCH: Appropriate mood and affect.  SKIN: No acne or hirsutism.  NECK: Neck symmetric without masses.  CHEST: Normal respiratory effort, speaking comfortably in full sentences, no respiratory distress.   CV: Well perfused throughout.    PELVIC: Deferred, recently completed at last clinic visit.  EXTREMITIES: No edema.        Last Pap:  3/19/2025    Ultrasound: Results for orders placed in visit on 04/12/24    US Pelvis Comp with Transvag NON-OB (xpd    Narrative  EXAMINATION:  US PELVIS COMP WITH TRANSVAG NON-OB (XPD)    CLINICAL HISTORY:  Secondary amenorrhea    TECHNIQUE:  Trans abdominal and transvaginal ultrasound of the pelvis.    COMPARISON:  10/20/2020    FINDINGS:  The uterus measures 8.8 cm in length by 4.5 x 5.5 cm in transverse dimension.  The endometrium measures 13 mm in thickness.    Right ovary measures 1.8 by 2.2 x 2.6 cm with a few cystic follicles.    Left ovary measures 3.2 by 0.6 by 2.1 cm with a few cystic follicles.    There is arterial and venous flow identified in the ovaries bilaterally.    No adnexal mass or free fluid in the pelvis.    Impression  Uterus normal in size.  Endometrium measures approximately 13 mm in thickness clinical correlation advised.    Otherwise unremarkable pelvic ultrasound specifically without evidence for adnexal mass or free fluid in pelvis.      Electronically signed by: Nino Kc, " DO  Date:    04/12/2024  Time:    08:59      Pathology: N/A    Assessment/ Plan:     1. Pre-op evaluation            1. To OR for TLH, BS, oophorectomy [planning on LEFT side; see below], cysto   Will work with OR to assess availability, seeking June or July 4 date    2. Consents reviewed and signed today, patient was queried and without questions  3. Surgical candidacy:  Body mass index is 38.94 kg/m².  PSxH: diagnostic laparoscopy x 2  Caprini score - moderate  Satisfied parity  Poor response to hydrocodone   Percocet has been taken without issue  ADHESIVE ALLERGY  Agreeable to blood transfusion    T&S, CBC, BMP, and day of surgery UPT  Explained precautions/restrictions after surgery    4. Surgical risk stratification visit with PCP   5. Discussed oophorectomy options in setting of endometriosis  At this age, with endometriosis, BMI, and vaping, previously discussed pros/cons of oophorectomy vs not; and need for future hormonal treatments  Included risk of needing future surgeries for ovarian etiologies, pain, increased risk/bleeding with oophorectomy  At this time, will plan for UNILATERAL (LEFT) oophorectomy, on patient's side of increased pain    If unable to remove ovary on LEFT/more painful side (for example, adhesions), not interested in contralateral ovary removal at this time.    Reviewed risk that this surgery does not improve pain, inability to remove ovary on more painful side, menopausal symptoms, worsening pain, need for future surgeries, ovarian neoplasms    Plan for a post-op visit in the clinic at 2 and 6 weeks      Counseling     With the patient I had a full discussion of the risks, benefits, and alternatives of all procedures to be performed, including but not limited to injury to other organs, failure to resolve problem, recurrence of disease state, and infection. We discussed the risks, benefits, and alternatives to blood transfusion as well.   Ms. Fernandez was counseled that because of her  underlying prior surgeries certain risks of the procedure such as adhesions, complications, and injury may be increased. All of 's questions were answered, and she voiced understanding. She agrees with the plan of care; therefore, we will proceed with the surgery as scheduled.            [1]   Social History  Tobacco Use   Smoking Status Every Day    Types: Vaping with nicotine   Smokeless Tobacco Never

## 2025-03-26 ENCOUNTER — HOSPITAL ENCOUNTER (OUTPATIENT)
Dept: RADIOLOGY | Facility: HOSPITAL | Age: 45
Discharge: HOME | End: 2025-03-26
Attending: STUDENT IN AN ORGANIZED HEALTH CARE EDUCATION/TRAINING PROGRAM
Payer: COMMERCIAL

## 2025-03-26 VITALS — BODY MASS INDEX: 39.15 KG/M2 | WEIGHT: 235 LBS | HEIGHT: 65 IN

## 2025-03-26 DIAGNOSIS — Z12.31 ENCOUNTER FOR SCREENING MAMMOGRAM FOR MALIGNANT NEOPLASM OF BREAST: ICD-10-CM

## 2025-03-26 PROCEDURE — 77067 SCR MAMMO BI INCL CAD: CPT | Mod: 26,,, | Performed by: RADIOLOGY

## 2025-03-26 PROCEDURE — 77067 SCR MAMMO BI INCL CAD: CPT | Mod: TC

## 2025-03-26 PROCEDURE — 77063 BREAST TOMOSYNTHESIS BI: CPT | Mod: 26,,, | Performed by: RADIOLOGY

## 2025-04-07 ENCOUNTER — OFFICE VISIT (OUTPATIENT)
Dept: OBSTETRICS AND GYNECOLOGY | Facility: CLINIC | Age: 45
End: 2025-04-07
Payer: COMMERCIAL

## 2025-04-07 VITALS
BODY MASS INDEX: 38.99 KG/M2 | HEIGHT: 65 IN | WEIGHT: 234 LBS | SYSTOLIC BLOOD PRESSURE: 120 MMHG | DIASTOLIC BLOOD PRESSURE: 82 MMHG

## 2025-04-07 DIAGNOSIS — Z01.818 PRE-OP EVALUATION: Primary | ICD-10-CM

## 2025-04-07 PROCEDURE — 99999 PR PBB SHADOW E&M-EST. PATIENT-LVL III: CPT | Mod: PBBFAC,,, | Performed by: STUDENT IN AN ORGANIZED HEALTH CARE EDUCATION/TRAINING PROGRAM

## 2025-04-07 PROCEDURE — 3074F SYST BP LT 130 MM HG: CPT | Mod: CPTII,S$GLB,, | Performed by: STUDENT IN AN ORGANIZED HEALTH CARE EDUCATION/TRAINING PROGRAM

## 2025-04-07 PROCEDURE — 4010F ACE/ARB THERAPY RXD/TAKEN: CPT | Mod: CPTII,S$GLB,, | Performed by: STUDENT IN AN ORGANIZED HEALTH CARE EDUCATION/TRAINING PROGRAM

## 2025-04-07 PROCEDURE — 99213 OFFICE O/P EST LOW 20 MIN: CPT | Mod: S$GLB,,, | Performed by: STUDENT IN AN ORGANIZED HEALTH CARE EDUCATION/TRAINING PROGRAM

## 2025-04-07 PROCEDURE — 3044F HG A1C LEVEL LT 7.0%: CPT | Mod: CPTII,S$GLB,, | Performed by: STUDENT IN AN ORGANIZED HEALTH CARE EDUCATION/TRAINING PROGRAM

## 2025-04-07 PROCEDURE — 3008F BODY MASS INDEX DOCD: CPT | Mod: CPTII,S$GLB,, | Performed by: STUDENT IN AN ORGANIZED HEALTH CARE EDUCATION/TRAINING PROGRAM

## 2025-04-07 PROCEDURE — 1159F MED LIST DOCD IN RCRD: CPT | Mod: CPTII,S$GLB,, | Performed by: STUDENT IN AN ORGANIZED HEALTH CARE EDUCATION/TRAINING PROGRAM

## 2025-04-07 PROCEDURE — 3079F DIAST BP 80-89 MM HG: CPT | Mod: CPTII,S$GLB,, | Performed by: STUDENT IN AN ORGANIZED HEALTH CARE EDUCATION/TRAINING PROGRAM

## 2025-04-14 ENCOUNTER — PATIENT MESSAGE (OUTPATIENT)
Dept: OBSTETRICS AND GYNECOLOGY | Facility: CLINIC | Age: 45
End: 2025-04-14
Payer: COMMERCIAL

## 2025-04-15 ENCOUNTER — TELEPHONE (OUTPATIENT)
Dept: OBSTETRICS AND GYNECOLOGY | Facility: CLINIC | Age: 45
End: 2025-04-15
Payer: COMMERCIAL

## 2025-04-15 DIAGNOSIS — N80.9 ENDOMETRIOSIS: Primary | ICD-10-CM

## 2025-04-15 NOTE — TELEPHONE ENCOUNTER
----- Message from Maay Madrid MD sent at 4/15/2025  8:32 AM CDT -----  Requested Date:  7/15Requested Time:  to follow in either Dr. Boyce or Dr. Luu's ORCase Length:  3 hoursSurgeon:  Elizabeh Guarisco  Visit Type: OUTPATIENT  PROCEDURE:  laparoscopic hysterectomy with bilateral salpingectomy and unilateral oophorectomy, cystoscopyDiagnosis:  chronic pelvic pain, endometriosisAnesthesia Type:  General Comments/Special Equipment Needed:  noneRep needed:  noneU/S at preop needed:  nonePCP clearance:  to be scheduledPost op apt: 2 weeks and 6 weeks in officePatti, can you please put a hold on my day until its a little closer and we know the exact time the case will be? Thank you!

## 2025-04-15 NOTE — TELEPHONE ENCOUNTER
Added to google.  Messaged pt.    Requested Date:  7/15   Requested Time:  to follow in either Dr. Boyce or Dr. Luu's OR   Case Length:  3 hours     Surgeon:  Elizabeh Guarisco    Visit Type: OUTPATIENT      PROCEDURE:  laparoscopic hysterectomy with bilateral salpingectomy and unilateral oophorectomy, cystoscopy   Diagnosis:  chronic pelvic pain, endometriosis   Anesthesia Type:  General     Comments/Special Equipment Needed:  none   Rep needed:  none   U/S at preop needed:  none   PCP clearance:  to be scheduled   Post op apt: 2 weeks and 6 weeks in office       Ivone, can you please put a hold on my day until its a little closer and we know the exact time the case will be? Thank you!

## 2025-05-21 DIAGNOSIS — Z01.419 ENCOUNTER FOR GYNECOLOGICAL EXAMINATION: ICD-10-CM

## 2025-05-21 RX ORDER — CITALOPRAM 40 MG/1
40 TABLET ORAL
Qty: 90 TABLET | Refills: 3 | Status: SHIPPED | OUTPATIENT
Start: 2025-05-21

## 2025-05-21 NOTE — TELEPHONE ENCOUNTER
Refill Decision Note   Missy Fernandez  is requesting a refill authorization.  Brief Assessment and Rationale for Refill:  Approve     Medication Therapy Plan:        Comments:     Note composed:10:20 AM 05/21/2025

## 2025-06-25 ENCOUNTER — HOSPITAL ENCOUNTER (OUTPATIENT)
Dept: RADIOLOGY | Facility: HOSPITAL | Age: 45
Discharge: HOME | End: 2025-06-25
Attending: NURSE PRACTITIONER
Payer: COMMERCIAL

## 2025-06-25 ENCOUNTER — OFFICE VISIT (OUTPATIENT)
Dept: PRIMARY CARE CLINIC | Facility: CLINIC | Age: 45
End: 2025-06-25
Payer: COMMERCIAL

## 2025-06-25 ENCOUNTER — TELEPHONE (OUTPATIENT)
Dept: PRIMARY CARE CLINIC | Facility: CLINIC | Age: 45
End: 2025-06-25
Payer: COMMERCIAL

## 2025-06-25 VITALS
DIASTOLIC BLOOD PRESSURE: 78 MMHG | OXYGEN SATURATION: 98 % | WEIGHT: 239.44 LBS | SYSTOLIC BLOOD PRESSURE: 124 MMHG | HEART RATE: 118 BPM | HEIGHT: 65 IN | BODY MASS INDEX: 39.89 KG/M2 | RESPIRATION RATE: 16 BRPM

## 2025-06-25 DIAGNOSIS — Z01.818 PRE-OP EXAMINATION: Primary | ICD-10-CM

## 2025-06-25 DIAGNOSIS — I10 ESSENTIAL HYPERTENSION: ICD-10-CM

## 2025-06-25 DIAGNOSIS — E66.01 SEVERE OBESITY (BMI 35.0-39.9) WITH COMORBIDITY: ICD-10-CM

## 2025-06-25 DIAGNOSIS — N80.9 ENDOMETRIOSIS: ICD-10-CM

## 2025-06-25 DIAGNOSIS — Z01.818 PRE-OP EXAMINATION: ICD-10-CM

## 2025-06-25 DIAGNOSIS — F41.1 GENERALIZED ANXIETY DISORDER: ICD-10-CM

## 2025-06-25 PROCEDURE — 4010F ACE/ARB THERAPY RXD/TAKEN: CPT | Mod: CPTII,S$GLB,, | Performed by: NURSE PRACTITIONER

## 2025-06-25 PROCEDURE — 1160F RVW MEDS BY RX/DR IN RCRD: CPT | Mod: CPTII,S$GLB,, | Performed by: NURSE PRACTITIONER

## 2025-06-25 PROCEDURE — 3008F BODY MASS INDEX DOCD: CPT | Mod: CPTII,S$GLB,, | Performed by: NURSE PRACTITIONER

## 2025-06-25 PROCEDURE — 93010 ELECTROCARDIOGRAM REPORT: CPT | Mod: S$GLB,,, | Performed by: INTERNAL MEDICINE

## 2025-06-25 PROCEDURE — 71046 X-RAY EXAM CHEST 2 VIEWS: CPT | Mod: 26,,, | Performed by: RADIOLOGY

## 2025-06-25 PROCEDURE — 3044F HG A1C LEVEL LT 7.0%: CPT | Mod: CPTII,S$GLB,, | Performed by: NURSE PRACTITIONER

## 2025-06-25 PROCEDURE — 1159F MED LIST DOCD IN RCRD: CPT | Mod: CPTII,S$GLB,, | Performed by: NURSE PRACTITIONER

## 2025-06-25 PROCEDURE — 93005 ELECTROCARDIOGRAM TRACING: CPT | Mod: S$GLB,,, | Performed by: NURSE PRACTITIONER

## 2025-06-25 PROCEDURE — 99214 OFFICE O/P EST MOD 30 MIN: CPT | Mod: S$GLB,,, | Performed by: NURSE PRACTITIONER

## 2025-06-25 PROCEDURE — 71046 X-RAY EXAM CHEST 2 VIEWS: CPT | Mod: TC

## 2025-06-25 PROCEDURE — 3078F DIAST BP <80 MM HG: CPT | Mod: CPTII,S$GLB,, | Performed by: NURSE PRACTITIONER

## 2025-06-25 PROCEDURE — 3074F SYST BP LT 130 MM HG: CPT | Mod: CPTII,S$GLB,, | Performed by: NURSE PRACTITIONER

## 2025-06-25 PROCEDURE — 99999 PR PBB SHADOW E&M-EST. PATIENT-LVL III: CPT | Mod: PBBFAC,,, | Performed by: NURSE PRACTITIONER

## 2025-06-25 NOTE — PROGRESS NOTES
[facility] Note    Chief Complaint      Chief Complaint   Patient presents with    Pre-op Exam     History of Present Illness      Missy Fernandez is a 44 y.o. female patient with chronic conditions of Anxiety, hypertension, tobacco use-vape who presents today for medical clearance for hysterectomy.        History of Present Illness    CHIEF COMPLAINT:  Missy presents today for pre-operative evaluation for upcoming hysterectomy.    CHRONIC PAIN:  She reports lifelong chronic left-sided pain that is intermittent but severe when present, described as taking her breath away. She experiences left-sided back pain that is worsening with weight gain, characterized by stabbing pain under the buttock. She also reports left-sided endometriosis contributing to pain, experiencing discomfort both anteriorly and posteriorly. The pain is not cyclical or monthly, but unpredictable and significantly impacts her quality of life.    PRE-OPERATIVE PLANNING:  She is scheduled for a total hysterectomy with plans to preserve the right ovary to minimize hormonal disruption. The left ovary is targeted for removal due to her lifelong history of left-sided pelvic pain. She understands that if surgical complications arise from endometriosis or scar tissue, both ovaries may need to be removed.    FAMILY HISTORY:  Her father was borderline diabetic for years before progressing to diabetes, which occurred a few years prior to his death.      ROS:  General: +hot flashes  Musculoskeletal: +back pain, +limb pain  Neurological: +dizziness, +lightheaded if meals are missed  Female Genitourinary: +pelvic pain         Health Maintenance   Topic Date Due    Pneumococcal Vaccines (Age 0-49) (1 of 2 - PCV) Never done    COVID-19 Vaccine (3 - 2024-25 season) 09/01/2024    Mammogram  03/26/2026    Hemoglobin A1c (Diabetic Prevention Screening)  01/31/2028    TETANUS VACCINE  09/25/2028    Cervical Cancer Screening  03/14/2030    RSV Vaccine  "(Age 60+ and Pregnant patients) (1 - 1-dose 75+ series) 10/08/2055    Hepatitis C Screening  Completed    HIV Screening  Completed    Lipid Panel  Completed    Influenza Vaccine  Addressed       Past Medical History:   Diagnosis Date    Anxiety     Depression     Severe pre-eclampsia in third trimester 10/30/2018    Tobacco use     Vaginal delivery 10/31/2018     after induction at 36 weeks due to pre-eclampsia with severe features.       Past Surgical History:   Procedure Laterality Date    PELVIC LAPAROSCOPY      Age 15 and Age 25    VAGINAL DELIVERY  , ,        family history includes Appendicitis in her paternal grandmother; Breast cancer in her maternal grandmother; Depression in her sister; Diabetes in her father; Heart disease in her father; Hypertension in her father; Kidney disease in her maternal grandfather; No Known Problems in her mother and sister.    Social History[1]    Encounter Medications[2]     Review of patient's allergies indicates:   Allergen Reactions    Adhesive        Physical Exam      Vital Signs  Pulse: (!) 118  Resp: 16  SpO2: 98 %  BP: 124/78  BP Location: Right arm  Patient Position: Sitting  Pain Score: 0-No pain  Height and Weight  Height: 5' 5" (165.1 cm)  Weight: 108.6 kg (239 lb 6.7 oz)  BSA (Calculated - sq m): 2.23 sq meters  BMI (Calculated): 39.8  Weight in (lb) to have BMI = 25: 149.9    Physical Exam  Vitals and nursing note reviewed.   Constitutional:       General: She is not in acute distress.     Appearance: Normal appearance. She is well-developed. She is not ill-appearing.   HENT:      Head: Normocephalic and atraumatic.      Right Ear: External ear normal.      Left Ear: External ear normal.   Eyes:      Conjunctiva/sclera: Conjunctivae normal.      Pupils: Pupils are equal, round, and reactive to light.   Neck:      Thyroid: No thyromegaly.      Vascular: No JVD.      Trachea: No tracheal deviation.   Cardiovascular:      Rate and Rhythm: Normal rate " and regular rhythm.      Heart sounds: Normal heart sounds. No murmur heard.  Pulmonary:      Effort: Pulmonary effort is normal.      Breath sounds: Normal breath sounds.   Chest:      Chest wall: No tenderness.   Abdominal:      General: Bowel sounds are normal.      Palpations: Abdomen is soft.      Tenderness: There is no abdominal tenderness. There is no guarding.   Musculoskeletal:         General: Normal range of motion.      Cervical back: Normal range of motion and neck supple.   Lymphadenopathy:      Cervical: No cervical adenopathy.   Skin:     General: Skin is warm and dry.   Neurological:      General: No focal deficit present.      Mental Status: She is alert and oriented to person, place, and time.   Psychiatric:         Mood and Affect: Mood normal.         Behavior: Behavior normal.         Thought Content: Thought content normal.         Judgment: Judgment normal.          Laboratory:  CBC:  Lab Results   Component Value Date    WBC 6.14 01/31/2025    RBC 4.88 01/31/2025    HGB 14.5 01/31/2025    HCT 45.4 01/31/2025     01/31/2025    MCV 93 01/31/2025    MCH 29.7 01/31/2025    MCHC 31.9 (L) 01/31/2025    MCHC 31.6 (L) 05/24/2023    MCHC 32.2 11/01/2018     CMP:  Lab Results   Component Value Date    GLU 93 01/31/2025    CALCIUM 8.8 01/31/2025    ALBUMIN 3.9 01/31/2025    PROT 7.4 01/31/2025     01/31/2025    K 3.7 01/31/2025    CO2 21 (L) 01/31/2025     01/31/2025    BUN 11 01/31/2025    ALKPHOS 72 01/31/2025    ALT 14 01/31/2025    AST 13 01/31/2025    BILITOT 0.5 01/31/2025    BILITOT 0.4 05/24/2023    BILITOT 0.3 10/30/2018     URINALYSIS:  Lab Results   Component Value Date    COLORU YELLOW 06/07/2004    SPECGRAV 1.019 06/07/2004    PHUR 6.0 06/07/2004    PROTEINUA NEG 06/07/2004    NITRITE NEG 06/07/2004    LEUKOCYTESUR NEG 06/07/2004    UROBILINOGEN 1 06/07/2004      LIPIDS:  Lab Results   Component Value Date    TSH 1.702 03/08/2024    TSH 1.250 05/24/2023    TSH 1.201  06/15/2018    HDL 35 (L) 01/31/2025    HDL 38 (L) 05/24/2023    CHOL 178 01/31/2025    CHOL 186 05/24/2023    TRIG 144 01/31/2025    TRIG 123 05/24/2023    LDLCALC 114.2 01/31/2025    LDLCALC 123.4 05/24/2023    CHOLHDL 19.7 (L) 01/31/2025    CHOLHDL 20.4 05/24/2023    NONHDLCHOL 143 01/31/2025    NONHDLCHOL 148 05/24/2023    TOTALCHOLEST 5.1 (H) 01/31/2025    TOTALCHOLEST 4.9 05/24/2023     TSH:  Lab Results   Component Value Date    TSH 1.702 03/08/2024    TSH 1.250 05/24/2023    TSH 1.201 06/15/2018     A1C:  Lab Results   Component Value Date    HGBA1C 5.5 01/31/2025    HGBA1C 5.6 05/24/2023         Assessment/Plan     Missy Fernandez is a 44 y.o.female with:    Assessment & Plan        SEVERE OBESITY (BMI 35.0-39.9) WITH COMORBIDITY:  - Initiated semaglutide (Wegovy) for weight management, pending insurance approval.  - Assessed the impact of obesity on patient's health, including worsening back pain and potential diabetes risk.    ## ENDOMETRIOSIS:  - Reviewed upcoming hysterectomy scheduled for the 15th with Dr. Madrid  - Monitored patient's constant severe left-sided pain due to endometriosis that takes her breath away, affecting both anterior and posterior aspects.  - Discussed surgical plan including possible removal of the left ovary due to endometriosis and scar tissue.    ## CHRONIC PAIN AND LUMBAGO WITH LEFT-SIDED SCIATICA:  - Evaluated chronic back pain primarily affecting the left side with characteristic stabbing sensation under the gluteal region.  - Will continue to monitor as this may be exacerbated by patient's current condition.    ## FAMILY HISTORY OF DIABETES MELLITUS:  - Evaluated family history of diabetes mellitus, noting father was borderline diabetic for years before developing overt diabetes.  - Assessed patient's current risk, reviewing recent lab results showing HbA1c within normal limits.  - Educated on potential for glucose fluctuations even with normal average HbA1c.          Pre-op  examination  -     CBC Auto Differential; Future; Expected date: 06/25/2025  -     Comprehensive Metabolic Panel; Future; Expected date: 06/25/2025  -     X-Ray Chest PA And Lateral; Future; Expected date: 06/25/2025  -     IN OFFICE EKG 12-LEAD (to Muse)    Endometriosis    Essential hypertension    Generalized anxiety disorder    Severe obesity (BMI 35.0-39.9) with comorbidity         Patient medically optimized for moderate risk procedure with low cardiac risk profile and may proceed with surgery  Health Maintenance Due   Topic Date Due    Pneumococcal Vaccines (Age 0-49) (1 of 2 - PCV) Never done    COVID-19 Vaccine (3 - 2024-25 season) 09/01/2024        I spent 36 minutes on the day of this encounter for preparing for, evaluating, treating, and managing this patient.      -Continue current medications and maintain follow up with specialists.  Return to clinic in Follow up in about 1 year (around 6/25/2026), or if symptoms worsen or fail to improve.     This note was generated with the assistance of ambient listening technology. Verbal consent was obtained by the patient and accompanying visitor(s) for the recording of patient appointment to facilitate this note. I attest to having reviewed and edited the generated note for accuracy, though some syntax or spelling errors may persist. Please contact the author of this note for any clarification.        OBINNA Mcfarland  [facility] location                         [1]   Social History  Tobacco Use    Smoking status: Every Day     Types: Vaping with nicotine    Smokeless tobacco: Never   Substance Use Topics    Alcohol use: Not Currently     Comment: rare    Drug use: No   [2]   Outpatient Encounter Medications as of 6/25/2025   Medication Sig Dispense Refill    citalopram (CELEXA) 40 MG tablet Take 1 tablet by mouth once daily 90 tablet 3    losartan (COZAAR) 50 MG tablet Take 1 tablet (50 mg total) by mouth once daily. 90 tablet 3     medroxyPROGESTERone (DEPO-PROVERA) 150 mg/mL Syrg Inject 1 mL (150 mg total) into the muscle every 3 (three) months. Ensure negative pregnancy test prior to injection. 1 mL 4    semaglutide, weight loss, (WEGOVY) 0.25 mg/0.5 mL PnIj Inject 0.25 mg into the skin every 7 days. (Patient not taking: Reported on 6/25/2025) 0.5 mL 3    [DISCONTINUED] hydroCHLOROthiazide (HYDRODIURIL) 12.5 MG Tab Take 1 tablet by mouth once daily (Patient not taking: Reported on 6/25/2025) 90 tablet 0    [DISCONTINUED] ibuprofen (ADVIL,MOTRIN) 600 MG tablet Take 600 mg by mouth 3 (three) times daily. (Patient not taking: Reported on 6/25/2025)      [DISCONTINUED] naproxen (NAPROSYN) 500 MG tablet Take 1 tablet (500 mg total) by mouth 2 (two) times daily as needed. (Patient not taking: Reported on 3/14/2025) 20 tablet 0     No facility-administered encounter medications on file as of 6/25/2025.

## 2025-06-27 ENCOUNTER — RESULTS FOLLOW-UP (OUTPATIENT)
Dept: PRIMARY CARE CLINIC | Facility: CLINIC | Age: 45
End: 2025-06-27
Payer: COMMERCIAL

## 2025-06-27 DIAGNOSIS — R93.89 ABNORMAL CXR: Primary | ICD-10-CM

## 2025-06-27 LAB
OHS QRS DURATION: 76 MS
OHS QTC CALCULATION: 448 MS

## 2025-06-27 NOTE — LETTER
July 1, 2025      [facility]  [address]  [place] [address]  Phone: [phone]       Patient: Missy Fernandez   YOB: 1980  Date of Visit: 07/01/2025    To Whom It May Concern:    Kin Fernandez  is our patient at [facility]. I am recommending that she stay home and work until after her surgery if at all possible.  If you have any questions or concerns, or if I can be of further assistance, please do not hesitate to contact me.    Sincerely,    Deepthi Carver, NP

## 2025-06-30 PROBLEM — E66.01 SEVERE OBESITY (BMI 35.0-39.9) WITH COMORBIDITY: Status: ACTIVE | Noted: 2025-06-30

## 2025-07-03 ENCOUNTER — RESULTS FOLLOW-UP (OUTPATIENT)
Dept: PRIMARY CARE CLINIC | Facility: CLINIC | Age: 45
End: 2025-07-03

## 2025-07-06 ENCOUNTER — ANESTHESIA EVENT (OUTPATIENT)
Dept: SURGERY | Facility: OTHER | Age: 45
End: 2025-07-06
Payer: COMMERCIAL

## 2025-07-07 ENCOUNTER — HOSPITAL ENCOUNTER (OUTPATIENT)
Dept: PREADMISSION TESTING | Facility: OTHER | Age: 45
Discharge: HOME | End: 2025-07-07
Attending: STUDENT IN AN ORGANIZED HEALTH CARE EDUCATION/TRAINING PROGRAM
Payer: COMMERCIAL

## 2025-07-07 ENCOUNTER — TELEPHONE (OUTPATIENT)
Dept: OBSTETRICS AND GYNECOLOGY | Facility: CLINIC | Age: 45
End: 2025-07-07
Payer: COMMERCIAL

## 2025-07-07 DIAGNOSIS — N80.9 ENDOMETRIOSIS: Primary | ICD-10-CM

## 2025-07-07 DIAGNOSIS — Z01.818 PREOP TESTING: Primary | ICD-10-CM

## 2025-07-07 LAB
INDIRECT COOMBS: NORMAL
RH BLD: NORMAL
SPECIMEN OUTDATE: NORMAL

## 2025-07-07 PROCEDURE — 86850 RBC ANTIBODY SCREEN: CPT

## 2025-07-07 RX ORDER — SODIUM CHLORIDE, SODIUM LACTATE, POTASSIUM CHLORIDE, CALCIUM CHLORIDE 600; 310; 30; 20 MG/100ML; MG/100ML; MG/100ML; MG/100ML
INJECTION, SOLUTION INTRAVENOUS CONTINUOUS
OUTPATIENT
Start: 2025-07-07

## 2025-07-07 RX ORDER — LIDOCAINE HYDROCHLORIDE 10 MG/ML
0.5 INJECTION, SOLUTION EPIDURAL; INFILTRATION; INTRACAUDAL; PERINEURAL ONCE
OUTPATIENT
Start: 2025-07-07 | End: 2025-07-07

## 2025-07-07 RX ORDER — ACETAMINOPHEN 500 MG
1000 TABLET ORAL
OUTPATIENT
Start: 2025-07-07 | End: 2025-07-07

## 2025-07-07 RX ORDER — ALBUTEROL SULFATE 2.5 MG/.5ML
2.5 SOLUTION RESPIRATORY (INHALATION)
OUTPATIENT
Start: 2025-07-07 | End: 2025-07-07

## 2025-07-07 RX ORDER — LIDOCAINE 50 MG/G
1 PATCH TOPICAL DAILY
Qty: 1 PATCH | Refills: 1 | Status: SHIPPED | OUTPATIENT
Start: 2025-07-07

## 2025-07-07 NOTE — DISCHARGE INSTRUCTIONS
Information to Prepare you for your Surgery    PRE-ADMIT TESTING   [address]  [place] [address]  [address]     Your surgery has been scheduled at [facility]. We are pleased to have the opportunity to serve you. For Further Information please call [phone].    On the day of surgery please report to Registration on the 1st floor of the [facility].    CONTACT YOUR PHYSICIAN'S OFFICE THE DAY PRIOR TO YOUR SURGERY TO OBTAIN YOUR ARRIVAL TIME.     The evening before surgery do not eat anything after 9 p.m. ( this includes hard candy, chewing gum and mints).  You may only have GATORADE, POWERADE AND WATER  from 9 p.m. until you leave your home.   DO NOT DRINK ANY LIQUIDS ON THE WAY TO THE HOSPITAL.      Why does your anesthesiologist allow you to drink Gatorade/Powerade before surgery?  Gatorade/Powerade helps to increase your comfort before surgery and to decrease your nausea after surgery.   The carbohydrates in Gatorade/Powerade help reduce your body's stress response to surgery.  If you are a diabetic-drink only water prior to surgery.    Outpatient Surgery- May allow 2 adults (18 and older)/ Support Persons (1 being the designated ) for all surgical/procedural patients. A breastfeeding mother will be allowed her infant and 2 adult Support Persons. No one under the age of 18 will be allowed in the building.    MEDICATION INSTRUCTIONS: TAKE medications checked off by the Anesthesiologist on your Medication List.    Surgery Patients:  If you take ASPIRIN - Your PHYSICIAN/SURGEON will need to inform you IF/OR when you need to stop taking aspirin prior to your surgery.     Starting the week prior to surgery, do not take any medications containing IBUPROFEN or NSAIDS (Advil, Aleve, BC, Celebrex, Goody's, Ketorolac, Meloxicam, Mobic, Motrin, Naproxen, Toradol, etc).  If you are not sure if you should take a medicine please call your surgeon's office.  You may take Tylenol.    Do  Not Wear any make-up (especially eye make-up) to surgery. Please remove any false eyelashes or eyelash extensions. If you arrive the day of surgery with makeup/eyelashes on you will be required to remove prior to surgery. (There is a risk of corneal abrasions if eye makeup/eyelash extensions are not removed)    Leave all valuables at home.   Do Not wear any jewelry or watches, including any metal in body piercings. Jewelry must be removed prior to coming to the hospital.  There is a possibility that rings that are unable to be removed may be cut off if they are on the surgical extremity.    Please remove all hair extensions, wigs, clips and any other metal accessories/ ornaments from your hair.  These items may pose a flammable/fire risk in Surgery and must be removed.    Do not shave your surgical area at least 5 days prior to your surgery. The surgical prep will be performed at the hospital according to Infection Control regulations.    Contact Lens must be removed before surgery. Either do not wear the contact lens or bring a case and solution for storage.  Please bring a container for eyeglasses or dentures as required.  Bring any paperwork your physician has provided, such as consent forms,  history and physicals, doctor's orders, etc.   Bring comfortable clothes that are loose fitting to wear upon discharge. Take into consideration the type of surgery being performed.  Maintain your diet as advised per your physician the day prior to surgery.    Adequate rest the night before surgery is advised.   Park in the Parking lot behind the hospital or in the [facility] across the street from the parking lot. Parking is complimentary.  If you will be discharged the same day as your procedure, please arrange for a responsible adult to drive you home or to accompany you if traveling by taxi.   YOU WILL NOT BE PERMITTED TO DRIVE OR TO LEAVE THE HOSPITAL ALONE AFTER SURGERY.   If you are being discharged the  same day, it is strongly recommended that you arrange for someone to remain with you for the first 24 hrs following your surgery.    The Surgeon will speak to your family/visitor after your surgery regarding the outcome of your surgery and post op care.  The Surgeon may speak to you after your surgery, but there is a possibility you may not remember the details.  Please check with your family members regarding the conversation with the Surgeon.    We strongly recommend whoever is bringing you home be present for discharge instructions.  This will ensure a thorough understanding for your post op home care.              Bathing Instructions with Hibiclens  Shower the evening before and morning of your procedure with Chlorhexidine (Hibiclens)    Do not use Chlorhexidine on your face or genitals. Do not get in your eyes.  Wash your face with water and your regular face wash/soap  Use your regular shampoo  Apply Chlorhexidine (Hibiclens) directly on your skin or on a wet washcloth and wash gently. When showering: Move away from the shower stream when applying Chlorhexidine (Hibiclens) to avoid rinsing off too soon.  Rinse thoroughly with warm water  Do not dilute Chlorhexidine (Hibiclens)   Dry off as usual, do not use any deodorant, powder, body lotions, perfume, after shave or cologne.     If the patient has fever, cough, or signs/symptoms of Flu or Covid please do not come in for your surgery.   Contact your surgeon and your primary care physician for further instructions.   Please also call [facility] Surgery [phone]. The unit opens at 5 AM.    If applicable, please bring your blood pressure & diabetes medications the day of surgery.

## 2025-07-07 NOTE — ANESTHESIA PREPROCEDURE EVALUATION
07/06/2025  Missy Fernandez is a 44 y.o., female.      Pre-op Assessment    I have reviewed the Patient Summary Reports.     I have reviewed the Nursing Notes. I have reviewed the NPO Status.   I have reviewed the Medications.     Review of Systems  Anesthesia Hx:             Denies Family Hx of Anesthesia complications.   Personal Hx of Anesthesia complications (reports had 2 surgeries, 1st surgery at 15 woke up with hives, 2nd surgery at 26yo but did not have hives. she is unsure of cause. both pelvic laproscopies)                    Social:  Smoker Vapes with nicotine       Hematology/Oncology:  Hematology Normal   Oncology Normal                                   EENT/Dental:  EENT/Dental Normal           Cardiovascular:     Hypertension               EKG 06/2025: Sinus tachycardia ()                             Pulmonary:        Sleep Apnea (does not wear cpap)                Renal/:  Renal/ Normal                 Hepatic/GI:  Hepatic/GI Normal        Not Taking GLP-1 Agonists (has not started yet)            Musculoskeletal:  Musculoskeletal Normal                Neurological:  Neurology Normal                                      Endocrine:  Endocrine Normal          Obesity / BMI > 30  Dermatological:  Skin Normal    Psych:   anxiety depression                Physical Exam  General: Well nourished, Cooperative, Alert and Oriented    Airway:  Mallampati: III   Mouth Opening: Normal  TM Distance: Normal  Tongue: Normal  Neck ROM: Normal ROM    Dental:  Intact  Has removable tooth/plate, upper        Anesthesia Plan  Type of Anesthesia, risks & benefits discussed:    Anesthesia Type: Gen ETT  Intra-op Monitoring Plan: Standard ASA Monitors  Post Op Pain Control Plan: multimodal analgesia  Induction:  IV  Airway Plan: Video, Post-Induction  Informed Consent: Informed consent signed with the  Patient and all parties understand the risks and agree with anesthesia plan.  All questions answered.   ASA Score: 2  Anesthesia Plan Notes: T&S  CBC, CMP 07/01/2025 in EPIC  PCP clearance 06/2025 in EPIC  Last GLP1 dose: has not started yet    Ready For Surgery From Anesthesia Perspective.     .

## 2025-07-07 NOTE — TELEPHONE ENCOUNTER
Julius pt calling, states she just had her pre-op appt for sx, was told she can't take ibuprofen or anything of the sorts, would like to know if there is something else she can take     7/7/25 @ 1532 Returned pt's message. She went to her Pre op visit today, she was instructed she can not take NSAIDS until after surgery. States she has been having some bad pain, and would like something for the pain, she can not take Vicodin. She has also missed work due to the pain, she is wanted to know if Dr Madrid give a note to work from and something for the pain. She would like to the note to state she has worked from home for the last two weeks and or missed work due to the pain. Pt instructed I will send this message to Dr Madrid. Pt states she has taken percocet with complication.     7/8/25 Per Dr Madrid:  we could do tramadol, or a short course of percocet, the lidocaine patch, scheduled tylenol. there are many options! but i just don't think i can say her chronic pain is a reason all the sudden she cant work

## 2025-07-07 NOTE — DISCHARGE INSTRUCTIONS
Information to Prepare you for your Surgery    PRE-ADMIT TESTING   [address]  [place] [facility]  [address]     Your surgery has been scheduled at [facility]. We are pleased to have the opportunity to serve you. For Further Information please call [phone].    On the day of surgery please report to Registration on the 1st floor of the [facility].    CONTACT YOUR PHYSICIAN'S OFFICE THE DAY PRIOR TO YOUR SURGERY TO OBTAIN YOUR ARRIVAL TIME.     The evening before surgery do not eat anything after 9 p.m. ( this includes hard candy, chewing gum and mints).  You may only have GATORADE, POWERADE AND WATER  from 9 p.m. until you leave your home.   DO NOT DRINK ANY LIQUIDS ON THE WAY TO THE HOSPITAL.      Why does your anesthesiologist allow you to drink Gatorade/Powerade before surgery?  Gatorade/Powerade helps to increase your comfort before surgery and to decrease your nausea after surgery.   The carbohydrates in Gatorade/Powerade help reduce your body's stress response to surgery.  If you are a diabetic-drink only water prior to surgery.    Outpatient Surgery- May allow 2 adults (18 and older)/ Support Persons (1 being the designated ) for all surgical/procedural patients. A breastfeeding mother will be allowed her infant and 2 adult Support Persons. No one under the age of 18 will be allowed in the building.    MEDICATION INSTRUCTIONS: TAKE medications checked off by the Anesthesiologist on your Medication List.    Surgery Patients:  If you take ASPIRIN - Your PHYSICIAN/SURGEON will need to inform you IF/OR when you need to stop taking aspirin prior to your surgery.     Starting the week prior to surgery, do not take any medications containing IBUPROFEN or NSAIDS (Advil, Aleve, BC, Celebrex, Goody's, Ketorolac, Meloxicam, Mobic, Motrin, Naproxen, Toradol, etc).  If you are not sure if you should take a medicine please call your surgeon's office.  You may take Tylenol.    Do  Not Wear any make-up (especially eye make-up) to surgery. Please remove any false eyelashes or eyelash extensions. If you arrive the day of surgery with makeup/eyelashes on you will be required to remove prior to surgery. (There is a risk of corneal abrasions if eye makeup/eyelash extensions are not removed)    Leave all valuables at home.   Do Not wear any jewelry or watches, including any metal in body piercings. Jewelry must be removed prior to coming to the hospital.  There is a possibility that rings that are unable to be removed may be cut off if they are on the surgical extremity.    Please remove all hair extensions, wigs, clips and any other metal accessories/ ornaments from your hair.  These items may pose a flammable/fire risk in Surgery and must be removed.    Do not shave your surgical area at least 5 days prior to your surgery. The surgical prep will be performed at the hospital according to Infection Control regulations.    Contact Lens must be removed before surgery. Either do not wear the contact lens or bring a case and solution for storage.  Please bring a container for eyeglasses or dentures as required.  Bring any paperwork your physician has provided, such as consent forms,  history and physicals, doctor's orders, etc.   Bring comfortable clothes that are loose fitting to wear upon discharge. Take into consideration the type of surgery being performed.  Maintain your diet as advised per your physician the day prior to surgery.    Adequate rest the night before surgery is advised.   Park in the Parking lot behind the hospital or in the [facility] across the street from the parking lot. Parking is complimentary.  If you will be discharged the same day as your procedure, please arrange for a responsible adult to drive you home or to accompany you if traveling by taxi.   YOU WILL NOT BE PERMITTED TO DRIVE OR TO LEAVE THE HOSPITAL ALONE AFTER SURGERY.   If you are being discharged the  same day, it is strongly recommended that you arrange for someone to remain with you for the first 24 hrs following your surgery.    The Surgeon will speak to your family/visitor after your surgery regarding the outcome of your surgery and post op care.  The Surgeon may speak to you after your surgery, but there is a possibility you may not remember the details.  Please check with your family members regarding the conversation with the Surgeon.    We strongly recommend whoever is bringing you home be present for discharge instructions.  This will ensure a thorough understanding for your post op home care.              Bathing Instructions with Hibiclens  Shower the evening before and morning of your procedure with Chlorhexidine (Hibiclens)    Do not use Chlorhexidine on your face or genitals. Do not get in your eyes.  Wash your face with water and your regular face wash/soap  Use your regular shampoo  Apply Chlorhexidine (Hibiclens) directly on your skin or on a wet washcloth and wash gently. When showering: Move away from the shower stream when applying Chlorhexidine (Hibiclens) to avoid rinsing off too soon.  Rinse thoroughly with warm water  Do not dilute Chlorhexidine (Hibiclens)   Dry off as usual, do not use any deodorant, powder, body lotions, perfume, after shave or cologne.     If the patient has fever, cough, or signs/symptoms of Flu or Covid please do not come in for your surgery.   Contact your surgeon and your primary care physician for further instructions.   Please also call [facility]patient Surgery [phone]. The unit opens at 5 AM.    If applicable, please bring your blood pressure & diabetes medications the day of surgery.

## 2025-07-08 RX ORDER — OXYCODONE AND ACETAMINOPHEN 5; 325 MG/1; MG/1
1 TABLET ORAL EVERY 8 HOURS PRN
Qty: 12 TABLET | Refills: 0 | Status: CANCELLED | OUTPATIENT
Start: 2025-07-08

## 2025-07-09 RX ORDER — OXYCODONE AND ACETAMINOPHEN 5; 325 MG/1; MG/1
1 TABLET ORAL EVERY 6 HOURS PRN
Qty: 16 TABLET | Refills: 0 | Status: SHIPPED | OUTPATIENT
Start: 2025-07-09

## 2025-07-15 ENCOUNTER — ANESTHESIA (OUTPATIENT)
Dept: SURGERY | Facility: OTHER | Age: 45
End: 2025-07-15
Payer: COMMERCIAL

## 2025-07-15 ENCOUNTER — NURSE TRIAGE (OUTPATIENT)
Dept: ADMINISTRATIVE | Facility: CLINIC | Age: 45
End: 2025-07-15
Payer: COMMERCIAL

## 2025-07-15 ENCOUNTER — HOSPITAL ENCOUNTER (OUTPATIENT)
Facility: OTHER | Age: 45
Discharge: HOME | End: 2025-07-16
Attending: STUDENT IN AN ORGANIZED HEALTH CARE EDUCATION/TRAINING PROGRAM | Admitting: OBSTETRICS & GYNECOLOGY
Payer: COMMERCIAL

## 2025-07-15 DIAGNOSIS — R10.2 CHRONIC PELVIC PAIN IN FEMALE: ICD-10-CM

## 2025-07-15 DIAGNOSIS — N80.9 ENDOMETRIOSIS: Primary | ICD-10-CM

## 2025-07-15 DIAGNOSIS — G89.29 CHRONIC PELVIC PAIN IN FEMALE: ICD-10-CM

## 2025-07-15 LAB
B-HCG UR QL: NEGATIVE
CTP QC/QA: YES

## 2025-07-15 PROCEDURE — 63600175 PHARM REV CODE 636 W HCPCS: Performed by: ANESTHESIOLOGY

## 2025-07-15 PROCEDURE — 36000711: Performed by: OBSTETRICS & GYNECOLOGY

## 2025-07-15 PROCEDURE — 25000003 PHARM REV CODE 250: Performed by: STUDENT IN AN ORGANIZED HEALTH CARE EDUCATION/TRAINING PROGRAM

## 2025-07-15 PROCEDURE — 25000003 PHARM REV CODE 250

## 2025-07-15 PROCEDURE — 99900035 HC TECH TIME PER 15 MIN (STAT)

## 2025-07-15 PROCEDURE — 37000009 HC ANESTHESIA EA ADD 15 MINS: Performed by: OBSTETRICS & GYNECOLOGY

## 2025-07-15 PROCEDURE — 27201423 OPTIME MED/SURG SUP & DEVICES STERILE SUPPLY: Performed by: OBSTETRICS & GYNECOLOGY

## 2025-07-15 PROCEDURE — 25000242 PHARM REV CODE 250 ALT 637 W/ HCPCS

## 2025-07-15 PROCEDURE — 63600175 PHARM REV CODE 636 W HCPCS: Performed by: STUDENT IN AN ORGANIZED HEALTH CARE EDUCATION/TRAINING PROGRAM

## 2025-07-15 PROCEDURE — 37000008 HC ANESTHESIA 1ST 15 MINUTES: Performed by: OBSTETRICS & GYNECOLOGY

## 2025-07-15 PROCEDURE — 71000039 HC RECOVERY, EACH ADD'L HOUR: Performed by: OBSTETRICS & GYNECOLOGY

## 2025-07-15 PROCEDURE — 71000033 HC RECOVERY, INTIAL HOUR: Performed by: OBSTETRICS & GYNECOLOGY

## 2025-07-15 PROCEDURE — 58571 TLH W/T/O 250 G OR LESS: CPT | Mod: ,,, | Performed by: OBSTETRICS & GYNECOLOGY

## 2025-07-15 PROCEDURE — 88307 TISSUE EXAM BY PATHOLOGIST: CPT | Mod: TC | Performed by: OBSTETRICS & GYNECOLOGY

## 2025-07-15 PROCEDURE — 94760 N-INVAS EAR/PLS OXIMETRY 1: CPT

## 2025-07-15 PROCEDURE — 36000710: Performed by: OBSTETRICS & GYNECOLOGY

## 2025-07-15 PROCEDURE — G0378 HOSPITAL OBSERVATION PER HR: HCPCS

## 2025-07-15 PROCEDURE — 58571 TLH W/T/O 250 G OR LESS: CPT | Mod: 82,,, | Performed by: STUDENT IN AN ORGANIZED HEALTH CARE EDUCATION/TRAINING PROGRAM

## 2025-07-15 PROCEDURE — 27000221 HC OXYGEN, UP TO 24 HOURS

## 2025-07-15 PROCEDURE — 25000003 PHARM REV CODE 250: Performed by: ANESTHESIOLOGY

## 2025-07-15 PROCEDURE — 63600175 PHARM REV CODE 636 W HCPCS

## 2025-07-15 PROCEDURE — 81025 URINE PREGNANCY TEST: CPT

## 2025-07-15 PROCEDURE — 94660 CPAP INITIATION&MGMT: CPT

## 2025-07-15 PROCEDURE — 88307 TISSUE EXAM BY PATHOLOGIST: CPT | Mod: 26,,, | Performed by: PATHOLOGY

## 2025-07-15 RX ORDER — MUPIROCIN 20 MG/G
OINTMENT TOPICAL
Status: DISCONTINUED | OUTPATIENT
Start: 2025-07-15 | End: 2025-07-15 | Stop reason: HOSPADM

## 2025-07-15 RX ORDER — OXYCODONE HYDROCHLORIDE 5 MG/1
5 TABLET ORAL
Status: DISCONTINUED | OUTPATIENT
Start: 2025-07-15 | End: 2025-07-15 | Stop reason: HOSPADM

## 2025-07-15 RX ORDER — PROCHLORPERAZINE EDISYLATE 5 MG/ML
5 INJECTION INTRAMUSCULAR; INTRAVENOUS EVERY 6 HOURS PRN
Status: DISCONTINUED | OUTPATIENT
Start: 2025-07-15 | End: 2025-07-15 | Stop reason: HOSPADM

## 2025-07-15 RX ORDER — MEPERIDINE HYDROCHLORIDE 25 MG/ML
12.5 INJECTION INTRAMUSCULAR; INTRAVENOUS; SUBCUTANEOUS ONCE AS NEEDED
Status: DISCONTINUED | OUTPATIENT
Start: 2025-07-15 | End: 2025-07-15 | Stop reason: RX

## 2025-07-15 RX ORDER — CYCLOBENZAPRINE HCL 5 MG
10 TABLET ORAL ONCE
Status: COMPLETED | OUTPATIENT
Start: 2025-07-15 | End: 2025-07-15

## 2025-07-15 RX ORDER — ACETAMINOPHEN 500 MG
1000 TABLET ORAL
Status: COMPLETED | OUTPATIENT
Start: 2025-07-15 | End: 2025-07-15

## 2025-07-15 RX ORDER — FAMOTIDINE 20 MG/1
20 TABLET, FILM COATED ORAL
Status: COMPLETED | OUTPATIENT
Start: 2025-07-15 | End: 2025-07-15

## 2025-07-15 RX ORDER — OXYCODONE HYDROCHLORIDE 10 MG/1
10 TABLET ORAL EVERY 6 HOURS PRN
Refills: 0 | Status: DISCONTINUED | OUTPATIENT
Start: 2025-07-15 | End: 2025-07-16 | Stop reason: HOSPADM

## 2025-07-15 RX ORDER — SODIUM CHLORIDE 9 MG/ML
INJECTION, SOLUTION INTRAVENOUS CONTINUOUS
Status: DISCONTINUED | OUTPATIENT
Start: 2025-07-15 | End: 2025-07-15

## 2025-07-15 RX ORDER — SUCCINYLCHOLINE CHLORIDE 20 MG/ML
INJECTION INTRAMUSCULAR; INTRAVENOUS
Status: DISCONTINUED | OUTPATIENT
Start: 2025-07-15 | End: 2025-07-15

## 2025-07-15 RX ORDER — IBUPROFEN 600 MG/1
600 TABLET, FILM COATED ORAL EVERY 6 HOURS
Status: DISCONTINUED | OUTPATIENT
Start: 2025-07-15 | End: 2025-07-16 | Stop reason: HOSPADM

## 2025-07-15 RX ORDER — GLUCAGON 1 MG
1 KIT INJECTION
Status: DISCONTINUED | OUTPATIENT
Start: 2025-07-15 | End: 2025-07-15 | Stop reason: HOSPADM

## 2025-07-15 RX ORDER — LIDOCAINE HYDROCHLORIDE 20 MG/ML
INJECTION INTRAVENOUS
Status: DISCONTINUED | OUTPATIENT
Start: 2025-07-15 | End: 2025-07-15

## 2025-07-15 RX ORDER — SIMETHICONE 80 MG
1 TABLET,CHEWABLE ORAL 3 TIMES DAILY PRN
Status: DISCONTINUED | OUTPATIENT
Start: 2025-07-15 | End: 2025-07-16 | Stop reason: HOSPADM

## 2025-07-15 RX ORDER — CEFAZOLIN 2 G/1
2 INJECTION, POWDER, FOR SOLUTION INTRAMUSCULAR; INTRAVENOUS
Status: COMPLETED | OUTPATIENT
Start: 2025-07-15 | End: 2025-07-15

## 2025-07-15 RX ORDER — LIDOCAINE HYDROCHLORIDE 10 MG/ML
0.5 INJECTION, SOLUTION EPIDURAL; INFILTRATION; INTRACAUDAL; PERINEURAL ONCE
Status: DISCONTINUED | OUTPATIENT
Start: 2025-07-15 | End: 2025-07-15 | Stop reason: HOSPADM

## 2025-07-15 RX ORDER — OXYCODONE AND ACETAMINOPHEN 5; 325 MG/1; MG/1
1 TABLET ORAL EVERY 6 HOURS PRN
Qty: 16 TABLET | Refills: 0 | Status: SHIPPED | OUTPATIENT
Start: 2025-07-15

## 2025-07-15 RX ORDER — ALBUTEROL SULFATE 2.5 MG/.5ML
2.5 SOLUTION RESPIRATORY (INHALATION)
Status: COMPLETED | OUTPATIENT
Start: 2025-07-15 | End: 2025-07-15

## 2025-07-15 RX ORDER — ENOXAPARIN SODIUM 100 MG/ML
40 INJECTION SUBCUTANEOUS
Status: COMPLETED | OUTPATIENT
Start: 2025-07-15 | End: 2025-07-15

## 2025-07-15 RX ORDER — KETOROLAC TROMETHAMINE 30 MG/ML
INJECTION, SOLUTION INTRAMUSCULAR; INTRAVENOUS
Status: DISCONTINUED | OUTPATIENT
Start: 2025-07-15 | End: 2025-07-15

## 2025-07-15 RX ORDER — OXYCODONE HYDROCHLORIDE 5 MG/1
5 TABLET ORAL EVERY 6 HOURS PRN
Refills: 0 | Status: DISCONTINUED | OUTPATIENT
Start: 2025-07-15 | End: 2025-07-16 | Stop reason: HOSPADM

## 2025-07-15 RX ORDER — SODIUM CHLORIDE, SODIUM LACTATE, POTASSIUM CHLORIDE, CALCIUM CHLORIDE 600; 310; 30; 20 MG/100ML; MG/100ML; MG/100ML; MG/100ML
INJECTION, SOLUTION INTRAVENOUS CONTINUOUS
Status: DISCONTINUED | OUTPATIENT
Start: 2025-07-15 | End: 2025-07-15

## 2025-07-15 RX ORDER — ROCURONIUM BROMIDE 10 MG/ML
INJECTION, SOLUTION INTRAVENOUS
Status: DISCONTINUED | OUTPATIENT
Start: 2025-07-15 | End: 2025-07-15

## 2025-07-15 RX ORDER — KETAMINE HCL IN 0.9 % NACL 50 MG/5 ML
SYRINGE (ML) INTRAVENOUS
Status: DISCONTINUED | OUTPATIENT
Start: 2025-07-15 | End: 2025-07-15

## 2025-07-15 RX ORDER — ONDANSETRON HYDROCHLORIDE 2 MG/ML
INJECTION, SOLUTION INTRAVENOUS
Status: DISCONTINUED | OUTPATIENT
Start: 2025-07-15 | End: 2025-07-15

## 2025-07-15 RX ORDER — SODIUM CHLORIDE 0.9 % (FLUSH) 0.9 %
3 SYRINGE (ML) INJECTION
Status: DISCONTINUED | OUTPATIENT
Start: 2025-07-15 | End: 2025-07-15 | Stop reason: HOSPADM

## 2025-07-15 RX ORDER — ONDANSETRON HYDROCHLORIDE 2 MG/ML
4 INJECTION, SOLUTION INTRAVENOUS DAILY PRN
Status: DISCONTINUED | OUTPATIENT
Start: 2025-07-15 | End: 2025-07-15 | Stop reason: HOSPADM

## 2025-07-15 RX ORDER — LOSARTAN POTASSIUM 50 MG/1
50 TABLET ORAL DAILY
Status: DISCONTINUED | OUTPATIENT
Start: 2025-07-15 | End: 2025-07-16 | Stop reason: HOSPADM

## 2025-07-15 RX ORDER — DEXAMETHASONE SODIUM PHOSPHATE 4 MG/ML
INJECTION, SOLUTION INTRA-ARTICULAR; INTRALESIONAL; INTRAMUSCULAR; INTRAVENOUS; SOFT TISSUE
Status: DISCONTINUED | OUTPATIENT
Start: 2025-07-15 | End: 2025-07-15

## 2025-07-15 RX ORDER — HYDROMORPHONE HYDROCHLORIDE 2 MG/ML
0.4 INJECTION, SOLUTION INTRAMUSCULAR; INTRAVENOUS; SUBCUTANEOUS EVERY 5 MIN PRN
Status: DISCONTINUED | OUTPATIENT
Start: 2025-07-15 | End: 2025-07-15 | Stop reason: HOSPADM

## 2025-07-15 RX ORDER — FENTANYL CITRATE 50 UG/ML
INJECTION, SOLUTION INTRAMUSCULAR; INTRAVENOUS
Status: DISCONTINUED | OUTPATIENT
Start: 2025-07-15 | End: 2025-07-15

## 2025-07-15 RX ORDER — PROPOFOL 10 MG/ML
VIAL (ML) INTRAVENOUS
Status: DISCONTINUED | OUTPATIENT
Start: 2025-07-15 | End: 2025-07-15

## 2025-07-15 RX ORDER — ACETAMINOPHEN 325 MG/1
650 TABLET ORAL EVERY 6 HOURS PRN
Status: DISCONTINUED | OUTPATIENT
Start: 2025-07-15 | End: 2025-07-16 | Stop reason: HOSPADM

## 2025-07-15 RX ORDER — ONDANSETRON 4 MG/1
4 TABLET, FILM COATED ORAL EVERY 6 HOURS PRN
Qty: 30 TABLET | Refills: 0 | Status: SHIPPED | OUTPATIENT
Start: 2025-07-15

## 2025-07-15 RX ORDER — CITALOPRAM 20 MG/1
40 TABLET ORAL DAILY
Status: DISCONTINUED | OUTPATIENT
Start: 2025-07-16 | End: 2025-07-16 | Stop reason: HOSPADM

## 2025-07-15 RX ADMIN — SUCCINYLCHOLINE CHLORIDE 180 MG: 20 INJECTION, SOLUTION INTRAMUSCULAR; INTRAVENOUS at 09:07

## 2025-07-15 RX ADMIN — ACETAMINOPHEN 1000 MG: 500 TABLET, FILM COATED ORAL at 07:07

## 2025-07-15 RX ADMIN — SODIUM CHLORIDE, SODIUM LACTATE, POTASSIUM CHLORIDE, AND CALCIUM CHLORIDE: .6; .31; .03; .02 INJECTION, SOLUTION INTRAVENOUS at 08:07

## 2025-07-15 RX ADMIN — FENTANYL CITRATE 100 MCG: 50 INJECTION, SOLUTION INTRAMUSCULAR; INTRAVENOUS at 09:07

## 2025-07-15 RX ADMIN — ALBUTEROL SULFATE 2.5 MG: 2.5 SOLUTION RESPIRATORY (INHALATION) at 08:07

## 2025-07-15 RX ADMIN — CYCLOBENZAPRINE HYDROCHLORIDE 10 MG: 5 TABLET, FILM COATED ORAL at 12:07

## 2025-07-15 RX ADMIN — Medication 30 MG: at 09:07

## 2025-07-15 RX ADMIN — HYDROMORPHONE HYDROCHLORIDE 0.4 MG: 2 INJECTION INTRAMUSCULAR; INTRAVENOUS; SUBCUTANEOUS at 11:07

## 2025-07-15 RX ADMIN — PROCHLORPERAZINE EDISYLATE 5 MG: 5 INJECTION INTRAMUSCULAR; INTRAVENOUS at 11:07

## 2025-07-15 RX ADMIN — IBUPROFEN 600 MG: 600 TABLET ORAL at 06:07

## 2025-07-15 RX ADMIN — FAMOTIDINE 20 MG: 20 TABLET, FILM COATED ORAL at 07:07

## 2025-07-15 RX ADMIN — ROCURONIUM BROMIDE 20 MG: 10 INJECTION INTRAVENOUS at 09:07

## 2025-07-15 RX ADMIN — OXYCODONE HYDROCHLORIDE 10 MG: 10 TABLET ORAL at 11:07

## 2025-07-15 RX ADMIN — PROPOFOL 200 MG: 10 INJECTION, EMULSION INTRAVENOUS at 09:07

## 2025-07-15 RX ADMIN — IBUPROFEN 600 MG: 600 TABLET ORAL at 11:07

## 2025-07-15 RX ADMIN — KETOROLAC TROMETHAMINE 30 MG: 30 INJECTION, SOLUTION INTRAMUSCULAR; INTRAVENOUS at 10:07

## 2025-07-15 RX ADMIN — Medication 20 MG: at 10:07

## 2025-07-15 RX ADMIN — ENOXAPARIN SODIUM 40 MG: 40 INJECTION SUBCUTANEOUS at 08:07

## 2025-07-15 RX ADMIN — SIMETHICONE 80 MG: 80 TABLET, CHEWABLE ORAL at 11:07

## 2025-07-15 RX ADMIN — CEFAZOLIN 2 G: 2 INJECTION, POWDER, FOR SOLUTION INTRAMUSCULAR; INTRAVENOUS at 09:07

## 2025-07-15 RX ADMIN — SUGAMMADEX 200 MG: 100 INJECTION, SOLUTION INTRAVENOUS at 11:07

## 2025-07-15 RX ADMIN — ONDANSETRON 4 MG: 2 INJECTION INTRAMUSCULAR; INTRAVENOUS at 11:07

## 2025-07-15 RX ADMIN — HYDROMORPHONE HYDROCHLORIDE 0.4 MG: 2 INJECTION INTRAMUSCULAR; INTRAVENOUS; SUBCUTANEOUS at 12:07

## 2025-07-15 RX ADMIN — MUPIROCIN: 20 OINTMENT TOPICAL at 07:07

## 2025-07-15 RX ADMIN — OXYCODONE HYDROCHLORIDE 10 MG: 10 TABLET ORAL at 06:07

## 2025-07-15 RX ADMIN — ROCURONIUM BROMIDE 50 MG: 10 INJECTION INTRAVENOUS at 09:07

## 2025-07-15 RX ADMIN — PROPOFOL 50 MG: 10 INJECTION, EMULSION INTRAVENOUS at 11:07

## 2025-07-15 RX ADMIN — SODIUM CHLORIDE, SODIUM LACTATE, POTASSIUM CHLORIDE, AND CALCIUM CHLORIDE: .6; .31; .03; .02 INJECTION, SOLUTION INTRAVENOUS at 10:07

## 2025-07-15 RX ADMIN — ONDANSETRON HYDROCHLORIDE 4 MG: 2 INJECTION INTRAMUSCULAR; INTRAVENOUS at 10:07

## 2025-07-15 RX ADMIN — LIDOCAINE HYDROCHLORIDE 100 MG: 20 INJECTION, SOLUTION INTRAVENOUS at 09:07

## 2025-07-15 RX ADMIN — DEXAMETHASONE SODIUM PHOSPHATE 8 MG: 4 INJECTION, SOLUTION INTRAMUSCULAR; INTRAVENOUS at 09:07

## 2025-07-15 NOTE — PLAN OF CARE
Problem: Adult Inpatient Plan of Care  Goal: Plan of Care Review  Outcome: Progressing     Problem: Infection  Goal: Absence of Infection Signs and Symptoms  Outcome: Progressing     Problem: Wound  Goal: Optimal Coping  Outcome: Progressing     Problem: Wound  Goal: Optimal Pain Control and Function  Outcome: Progressing     Problem: Wound  Goal: Optimal Wound Healing  Outcome: Progressing     Problem: Wound  Goal: Skin Health and Integrity  Outcome: Progressing     Problem: Fall Injury Risk  Goal: Absence of Fall and Fall-Related Injury  Outcome: Progressing

## 2025-07-15 NOTE — ANESTHESIA PROCEDURE NOTES
Intubation    Date/Time: 7/15/2025 9:21 AM    Performed by: dJ Aquino CRNA  Authorized by: Ag Up MD    Intubation:     Induction:  Rapid sequence induction    Intubated:  Postinduction    Mask Ventilation:  Not attempted    Attempts:  1    Attempted By:  CRNA    Method of Intubation:  Video laryngoscopy    Blade:  Lopez 3    Laryngeal View Grade: Grade I - full view of cords      Difficult Airway Encountered?: No      Complications:  None    Airway Device:  Oral endotracheal tube    Airway Device Size:  7.5    Style/Cuff Inflation:  Cuffed (inflated to minimal occlusive pressure)    Tube secured:  22    Secured at:  The lips    Placement Verified By:  Capnometry    Complicating Factors:  None    Findings Post-Intubation:  BS equal bilateral and atraumatic/condition of teeth unchanged

## 2025-07-15 NOTE — H&P
Chief Complaint:  Pre-Operative Visit for Hysterectomy     History of Present Illness     Missy Fernandez is a 44 y.o.  here for preop visit. Presents with SO.     Pre-operative optimization was needed.     No LMP recorded (lmp unknown). Patient has had an injection. She is currently using Depo-Provera for contraception.          Past Medical History:   Diagnosis Date    Anxiety      Depression      Severe pre-eclampsia in third trimester 10/30/2018    Tobacco use      Vaginal delivery 10/31/2018      after induction at 36 weeks due to pre-eclampsia with severe features.               Past Surgical History:   Procedure Laterality Date    PELVIC LAPAROSCOPY         Age 15 and Age 25    VAGINAL DELIVERY   , ,                 Family History   Problem Relation Name Age of Onset    No Known Problems Mother        Heart disease Father Kt Valdez      Hypertension Father Kt Valdez      Diabetes Father Kt Valdez      No Known Problems Sister        Depression Sister She Valdez      Breast cancer Maternal Grandmother             each breast at different times    Kidney disease Maternal Grandfather        Appendicitis Paternal Grandmother        Ovarian cancer Neg Hx        Colon cancer Neg Hx             [Tobacco Use History]    [Tobacco Use History]       Tobacco Use   Smoking Status Every Day    Types: Vaping with nicotine   Smokeless Tobacco Never        Social History           Substance and Sexual Activity   Sexual Activity Yes    Partners: Male    Birth control/protection: Injection, None     Comment: :  Sonny                           OB History    Para Term  AB Living    4 4 3 1   3    SAB IAB Ectopic Multiple Live Births               3           # Outcome Date GA Lbr Henry/2nd Weight Sex Type Anes PTL Lv   4  10/31/18 36w5d / 00:20 2.82 kg (6 lb 3.5 oz) M Vag-Spont EPI Y AIMLCAR   3 Term  40w0d   3.629 kg (8 lb) M Vag-Spont     AMILCAR      Birth Comments:  "pp preEclampsia admit for Magnesium sulfate   2 Term 2009 40w0d   3.09 kg (6 lb 13 oz) F Vag-Spont     AMILCAR   1 Term                         Obstetric Comments   Menarche 14         Review of Systems  GENERAL:  Feeling well overall.   SKIN: Denies rash.   HEENT: Denies vision changes.   CARDIOVASCULAR: Denies chest pain.   RESPIRATORY: Denies shortness of breath.  BREASTS: Denies pain, lumps.   ABDOMEN: Denies constipation, diarrhea, vomiting.  URINARY: Denies dysuria, hematuria.  NEUROLOGIC: Denies syncope or weakness.       Objective:      /82 (BP Location: Left arm, Patient Position: Sitting)   Ht 5' 5" (1.651 m)   Wt 106.1 kg (234 lb 0.3 oz)   LMP  (LMP Unknown)   BMI 38.94 kg/m²      Body mass index is 38.94 kg/m².     APPEARANCE: Well nourished, well developed, in no acute distress.  PSYCH: Appropriate mood and affect.  SKIN: No acne or hirsutism.  NECK: Neck symmetric without masses.  CHEST: Normal respiratory effort, speaking comfortably in full sentences, no respiratory distress.   CV: Well perfused throughout.    PELVIC: Deferred, recently completed at last clinic visit.  EXTREMITIES: No edema.         Last Pap:  3/19/2025     Ultrasound: Results for orders placed in visit on 04/12/24     US Pelvis Comp with Transvag NON-OB (xpd     Narrative  EXAMINATION:  US PELVIS COMP WITH TRANSVAG NON-OB (XPD)     CLINICAL HISTORY:  Secondary amenorrhea     TECHNIQUE:  Trans abdominal and transvaginal ultrasound of the pelvis.     COMPARISON:  10/20/2020     FINDINGS:  The uterus measures 8.8 cm in length by 4.5 x 5.5 cm in transverse dimension.  The endometrium measures 13 mm in thickness.     Right ovary measures 1.8 by 2.2 x 2.6 cm with a few cystic follicles.     Left ovary measures 3.2 by 0.6 by 2.1 cm with a few cystic follicles.     There is arterial and venous flow identified in the ovaries bilaterally.     No adnexal mass or free fluid in the pelvis.     Impression  Uterus normal in size.  " Endometrium measures approximately 13 mm in thickness clinical correlation advised.     Otherwise unremarkable pelvic ultrasound specifically without evidence for adnexal mass or free fluid in pelvis.        Electronically signed by:Nino Kc DO  Date:                                                04/12/2024  Time:                                               08:59        Pathology: N/A     Assessment/ Plan:      1. Pre-op evaluation                1. To OR for TLH, BS, oophorectomy [planning on LEFT side; see below], cysto   Will work with OR to assess availability, seeking June or July 4 date     2. Consents reviewed and signed today, patient was queried and without questions  3. Surgical candidacy:  Body mass index is 38.94 kg/m².  PSxH: diagnostic laparoscopy x 2  Caprini score - moderate  Satisfied parity  Poor response to hydrocodone   Percocet has been taken without issue  ADHESIVE ALLERGY  Agreeable to blood transfusion     T&S, CBC, BMP, and day of surgery UPT  Explained precautions/restrictions after surgery     4. Surgical risk stratification visit with PCP   5. Discussed oophorectomy options in setting of endometriosis  At this age, with endometriosis, BMI, and vaping, previously discussed pros/cons of oophorectomy vs not; and need for future hormonal treatments  Included risk of needing future surgeries for ovarian etiologies, pain, increased risk/bleeding with oophorectomy  At this time, will plan for UNILATERAL (LEFT) oophorectomy, on patient's side of increased pain     If unable to remove ovary on LEFT/more painful side (for example, adhesions), not interested in contralateral ovary removal at this time.     Reviewed risk that this surgery does not improve pain, inability to remove ovary on more painful side, menopausal symptoms, worsening pain, need for future surgeries, ovarian neoplasms     Plan for a post-op visit in the clinic at 2 and 6 weeks        Counseling      With the patient I had  a full discussion of the risks, benefits, and alternatives of all procedures to be performed, including but not limited to injury to other organs, failure to resolve problem, recurrence of disease state, and infection. We discussed the risks, benefits, and alternatives to blood transfusion as well.   Ms. Fernandez was counseled that because of her underlying prior surgeries certain risks of the procedure such as adhesions, complications, and injury may be increased. All of 's questions were answered, and she voiced understanding. She agrees with the plan of care; therefore, we will proceed with the surgery as scheduled.

## 2025-07-15 NOTE — TRANSFER OF CARE
"Anesthesia Transfer of Care Note    Patient: Missy Fernandez    Procedure(s) Performed: Procedure(s) (LRB):  HYSTERECTOMY,TOTAL,LAPAROSCOPIC,WITH BILATERAL SALPINGECTOMY AND LEFT OOPHORECTOMY (Bilateral)    Patient location: PACU    Anesthesia Type: general    Transport from OR: Transported from OR on 6-10 L/min O2 by face mask with adequate spontaneous ventilation    Post pain: adequate analgesia    Post assessment: no apparent anesthetic complications and tolerated procedure well    Post vital signs: stable    Level of consciousness: responds to stimulation and sedated    Nausea/Vomiting: no nausea/vomiting    Complications: none    Transfer of care protocol was followed      Last vitals: Visit Vitals  /82   Pulse 82   Temp 36.4 °C (97.6 °F)   Resp 16   Ht 5' 5" (1.651 m)   Wt 108.4 kg (239 lb)   LMP  (Within Years)   SpO2 99%   Breastfeeding No   BMI 39.77 kg/m²     "

## 2025-07-15 NOTE — TELEPHONE ENCOUNTER
Spoke with nurse Joelle Baer. Patient is inpatient at [facility]. She is post total hysterectomy. Patient carrillo snot have PRN medications to administer. Attempted to contact the on call provider. Per Dr. Rizzo, the floor nurse should contact Dr. Glynn directly. Caller verbalizes understanding.     Reason for Disposition   [1] Caller has URGENT medicine question about med that primary care doctor (or NP/PA) or specialist prescribed AND [2] triager unable to answer question    Protocols used: Medication Question Call-A-

## 2025-07-15 NOTE — PLAN OF CARE
Patient would like  at bedside, upon discharge and given updates. Belongings are With , Osmel [phone]

## 2025-07-15 NOTE — OP NOTE
[facility]  Operative Note    SUMMARY     Date of Procedure: 7/15/2025     Procedure: Procedure(s) (LRB):  HYSTERECTOMY,TOTAL,LAPAROSCOPIC,WITH BILATERAL SALPINGECTOMY AND LEFT OOPHORECTOMY (Bilateral)       Surgeon: Maricruz Boyce M.D.    Assistant: Negra Luu M.D.        Pre-Operative Diagnosis: Endometriosis [N80.9]  Pelvic Pain    Post-Operative Diagnosis: Post-Op Diagnosis Codes:     * Endometriosis [N80.9]  Pelvic Pain    Anesthesia: General    Complications: No    Estimated Blood Loss (EBL): 150 cc    Findings: Normal appearing uterus and bilateral tubes. Bilateral ovaries normal in appearance other than 2 small spots of suspected endo on left ovary.     Procedure in Detail:  Ms. Fernandez was taken to the operating room, and a time out was performed. General anesthesia was performed. The patient was then prepped and draped in the normal sterile fashion. She was placed in the dorsal lithotomy position in Rocael stirrups. Her arms were tucked in the usual fashion. The cervix was grasped with a single tooth tenaculum and stay sutures placed at 3 & 9 o'clock. Uterus sounded to 10 cm.  8 cm LORI tip and large Koh cup were then assembled and placed in and around the cervix. A sofia was placed to gravity.         Attention was then turned abdomen, and a 5 mm umbilical skin incision was made with a knife. Towel clamps were used to elevate the abdomen. The abdomen was entered under direct visualization with the visiport trocar. The abdomen was then insufflated to 15 mmHg. The patient was then placed in trendelenburg position. Two 5 mm bladeless trocars were placed in the LLQ in the usual fashion. A 5 mm  bladeless trocar was placed in the RLQ in the usual fashion.   The abdomen was then evaluated and the findings were as noted above. Using a ligasure, the left IP ligament was transected and the left fallopian tube and ovary were excised. .The right fallopian tube was then  excised from the right ovary. The left round ligament, utero-ovarian ligament and cardinal ligaments were taken down using the ligasure to the level of the uterine arteries. This was then repeated on the right. The bilateral uterine arteries were then ligated with the ligasure and the colpotomy was then created using monopolar cautery. The uterus and bilateral fallopian tubes were then pulled out via the vagina.        0 V-Lock suture passed through the vaginal cuff and the cuff was closed laparoscopically. The cuff was irrigated and carefully examined. The LSO pedicle was oozing, so surgiflow was applied. The excess was applied to the cuff itself. Good hemostasis was noted at the cuff and at all pedicles at that point. The intra-abdominal pressure was lowered and good hemostasis was maintained.  The trocars were then removed. The skin incisions were closed using 4-0 Monocryl.      The instruments were removed from the vagina with excellent hemostasis and no active bleeding was noted. The patient tolerated the procedure well. Sponge lap and needle counts correct x 2.     No qualified resident available for this procedure.

## 2025-07-16 VITALS
SYSTOLIC BLOOD PRESSURE: 126 MMHG | BODY MASS INDEX: 39.82 KG/M2 | TEMPERATURE: 98 F | OXYGEN SATURATION: 95 % | RESPIRATION RATE: 18 BRPM | DIASTOLIC BLOOD PRESSURE: 71 MMHG | HEART RATE: 67 BPM | HEIGHT: 65 IN | WEIGHT: 239 LBS

## 2025-07-16 PROBLEM — G89.29 CHRONIC PELVIC PAIN IN FEMALE: Status: ACTIVE | Noted: 2025-07-16

## 2025-07-16 PROBLEM — R10.2 CHRONIC PELVIC PAIN IN FEMALE: Status: ACTIVE | Noted: 2025-07-16

## 2025-07-16 PROCEDURE — G0378 HOSPITAL OBSERVATION PER HR: HCPCS

## 2025-07-16 PROCEDURE — 27000190 HC CPAP FULL FACE MASK W/VALVE

## 2025-07-16 PROCEDURE — 99900035 HC TECH TIME PER 15 MIN (STAT)

## 2025-07-16 PROCEDURE — 25000003 PHARM REV CODE 250: Performed by: OBSTETRICS & GYNECOLOGY

## 2025-07-16 PROCEDURE — 94760 N-INVAS EAR/PLS OXIMETRY 1: CPT

## 2025-07-16 PROCEDURE — 27000221 HC OXYGEN, UP TO 24 HOURS

## 2025-07-16 PROCEDURE — 25000003 PHARM REV CODE 250

## 2025-07-16 PROCEDURE — 99232 SBSQ HOSP IP/OBS MODERATE 35: CPT | Mod: ,,, | Performed by: OBSTETRICS & GYNECOLOGY

## 2025-07-16 RX ORDER — POLYETHYLENE GLYCOL 3350 17 G/17G
17 POWDER, FOR SOLUTION ORAL DAILY
Status: DISCONTINUED | OUTPATIENT
Start: 2025-07-16 | End: 2025-07-16 | Stop reason: HOSPADM

## 2025-07-16 RX ORDER — IBUPROFEN 600 MG/1
600 TABLET, FILM COATED ORAL EVERY 6 HOURS
Qty: 30 TABLET | Refills: 0 | Status: SHIPPED | OUTPATIENT
Start: 2025-07-16

## 2025-07-16 RX ORDER — LOSARTAN POTASSIUM 50 MG/1
50 TABLET ORAL DAILY
Qty: 90 TABLET | Refills: 3 | Status: SHIPPED | OUTPATIENT
Start: 2025-07-16 | End: 2026-07-16

## 2025-07-16 RX ORDER — SIMETHICONE 80 MG
80 TABLET,CHEWABLE ORAL 3 TIMES DAILY PRN
Qty: 30 TABLET | Refills: 0 | Status: SHIPPED | OUTPATIENT
Start: 2025-07-16

## 2025-07-16 RX ORDER — OXYCODONE HYDROCHLORIDE 5 MG/1
5 TABLET ORAL EVERY 6 HOURS PRN
Qty: 20 TABLET | Refills: 0 | Status: SHIPPED | OUTPATIENT
Start: 2025-07-16

## 2025-07-16 RX ORDER — CITALOPRAM 40 MG/1
40 TABLET ORAL DAILY
Qty: 30 TABLET | Refills: 11 | Status: SHIPPED | OUTPATIENT
Start: 2025-07-16 | End: 2026-07-16

## 2025-07-16 RX ADMIN — CITALOPRAM HYDROBROMIDE 40 MG: 20 TABLET ORAL at 08:07

## 2025-07-16 RX ADMIN — POLYETHYLENE GLYCOL 3350 17 G: 17 POWDER, FOR SOLUTION ORAL at 09:07

## 2025-07-16 RX ADMIN — SIMETHICONE 80 MG: 80 TABLET, CHEWABLE ORAL at 06:07

## 2025-07-16 RX ADMIN — OXYCODONE HYDROCHLORIDE 10 MG: 10 TABLET ORAL at 06:07

## 2025-07-16 RX ADMIN — ACETAMINOPHEN 650 MG: 325 TABLET ORAL at 09:07

## 2025-07-16 RX ADMIN — IBUPROFEN 600 MG: 600 TABLET ORAL at 06:07

## 2025-07-16 RX ADMIN — LOSARTAN POTASSIUM 50 MG: 50 TABLET, FILM COATED ORAL at 08:07

## 2025-07-16 NOTE — PLAN OF CARE
Free from falls, injury, or skin breakdown this hospital admission. Pt eager & in agreement w/ DC. VU of DC instructions--paperwork passed & explained. Scripts filled and delivered to bedside. IV removed w/ cath tip intact, WNL. Voiding, ambulating, & tolerating PO well. Lap sites x4 WNL. To be DCd home w/ family--will be escorted downstairs via  transport team once dressed, ready & ride arrives. Pt discharged in no distress w/ spouse.

## 2025-07-16 NOTE — PLAN OF CARE
Problem: Adult Inpatient Plan of Care  Goal: Plan of Care Review  7/16/2025 0510 by Elier Unger LPN  Outcome: Progressing  7/16/2025 0510 by Elier Unger LPN  Outcome: Progressing  Goal: Patient-Specific Goal (Individualized)  Outcome: Progressing  Goal: Absence of Hospital-Acquired Illness or Injury  Outcome: Progressing  Goal: Optimal Comfort and Wellbeing  Outcome: Progressing  Goal: Readiness for Transition of Care  Outcome: Progressing     Problem: Infection  Goal: Absence of Infection Signs and Symptoms  Outcome: Progressing

## 2025-07-16 NOTE — PLAN OF CARE
Case Management Final Discharge Note    Discharge Disposition: Home    New DME ordered / company name: None    Relevant SDOH / Transition of Care Barriers:  None    Person available to provide assistance at home when needed and their contact information: Self & Osmel Fernandez/spouse/[phone]    Scheduled followup appointment: Dr. Boyce on 8-01-25 at 4:15PM. Appointment was noted on pt AVS    Referrals placed: None    Transportation: Spouse        Patient and family educated on discharge services and updated on DC plan. Bedside RN notified, patient clear to discharge from Case Management Perspective.      07/16/25 1015   Final Note   Assessment Type Final Discharge Note   Anticipated Discharge Disposition Home   What phone number can be called within the next 1-3 days to see how you are doing after discharge? [phone]   Hospital Resources/Appts/Education Provided Provided patient/caregiver with written discharge plan information;Appointments scheduled and added to AVS   Post-Acute Status   Discharge Delays None known at this time     [facility]urg ([address])  Discharge Final Note    Primary Care Provider: Deepthi Carver NP    Expected Discharge Date: 7/16/2025    Final Discharge Note (most recent)       Final Note - 07/16/25 1015          Final Note    Assessment Type Final Discharge Note (P)      Anticipated Discharge Disposition Home or Self Care (P)      What phone number can be called within the next 1-3 days to see how you are doing after discharge? [phone] (P)      Hospital Resources/Appts/Education Provided Provided patient/caregiver with written discharge plan information;Appointments scheduled and added to AVS (P)         Post-Acute Status    Discharge Delays None known at this time (P)                      Important Message from Medicare

## 2025-07-16 NOTE — PROGRESS NOTES
Post-Op Progress Note        Subjective:      Post-Op Day #1 after TLH/LSO for chronic pelvic pain. Patient was kept overnight due to anesthesia concerns as she had trouble keeping sats up immediately post op due to sleep apnea. Pt did well overnight and     Patient is without complaints. Pain is well controlled. Tolerating Full Regular diet. (--) flatus. She is not ambulating as voiding trial not done overnight and sofai still in. Overall patient is doing well.     Objective:      Temp:  [97 °F (36.1 °C)-98.7 °F (37.1 °C)] 98.1 °F (36.7 °C)  Pulse:  [] 67  Resp:  [14-18] 18  SpO2:  [92 %-98 %] 95 %  BP: (106-157)/(61-87) 126/71    Intake/Output Summary (Last 24 hours) at 7/16/2025 0851  Last data filed at 7/16/2025 0522  Gross per 24 hour   Intake 2040 ml   Output 1155 ml   Net 885 ml     Body mass index is 39.77 kg/m².    General: no acute distress  Abdomen: soft, non-tender, non-distended;clean, dry bandages in place   Extremities: non-tender, symmetric, trace edema    No results found for this or any previous visit (from the past 2 weeks).    Assessment:     44 y.o. female S/P TLH/LSO, Post-Op Day #1  - Doing Well      Plan:     1. Continue routine post-operative care  2. Plan for D/C today after voidning trial

## 2025-07-16 NOTE — PLAN OF CARE
Case Management Assessment     PCP: Deepthi Carver  Pharmacy: [facility] ([facility])    Patient Arrived From: Home  Existing Help at Home: Spouse    Barriers to Discharge: None    Discharge Plan:    A. Home with Family   B. Home with HH      Pt is AAOx3.  Independent at baseline.  Uses CPAP machine.  PCP is correct on face sheet.  Spouse will transport pt to home when discharged.             07/16/25 1007   Discharge Assessment   Assessment Type Discharge Planning Assessment   Confirmed/corrected address, phone number and insurance Yes   Confirmed Demographics Correct on Facesheet   Source of Information patient   Communicated ALEXSANDAR with patient/caregiver Date not available/Unable to determine   People in Home spouse   Name(s) of People in Home JACKELYN FROST (Spouse)  [phone]   Do you have help at home or someone to help you manage your care at home? Yes   Who are your caregiver(s) and their phone number(s)? JACKELYN FROST (Spouse)  [phone]   Prior to hospitilization cognitive status: Alert/Oriented   Current cognitive status: Alert/Oriented   Walking or Climbing Stairs Difficulty no   Dressing/Bathing Difficulty no   Equipment Currently Used at Home CPAP   Readmission within 30 days? No   Patient currently being followed by outpatient case management? No   Do you currently have service(s) that help you manage your care at home? No   Do you take prescription medications? Yes   Do you have prescription coverage? Yes   Do you have any problems affording any of your prescribed medications? No   Is the patient taking medications as prescribed? yes   Who is going to help you get home at discharge? JACKELYN FROST (Spouse)  [phone]   How do you get to doctors appointments? car, drives self   Are you on dialysis? No   Do you take coumadin? No   Discharge Plan A Home with family   Discharge Plan B Home Health   DME Needed Upon Discharge  none   Discharge Plan discussed with: Patient   Transition of Care Barriers  None   Physical Activity   On average, how many days per week do you engage in moderate to strenuous exercise (like a brisk walk)? 0 days   On average, how many minutes do you engage in exercise at this level? 0 min   Financial Resource Strain   How hard is it for you to pay for the very basics like food, housing, medical care, and heating? Not hard   Housing Stability   In the last 12 months, was there a time when you were not able to pay the mortgage or rent on time? N   At any time in the past 12 months, were you homeless or living in a shelter (including now)? N   Transportation Needs   In the past 12 months, has lack of transportation kept you from medical appointments or from getting medications? no   In the past 12 months, has lack of transportation kept you from meetings, work, or from getting things needed for daily living? No   Food Insecurity   Within the past 12 months, you worried that your food would run out before you got the money to buy more. Never true   Within the past 12 months, the food you bought just didn't last and you didn't have money to get more. Never true   Stress   Do you feel stress - tense, restless, nervous, or anxious, or unable to sleep at night because your mind is troubled all the time - these days? To some exte   Social Isolation   How often do you feel lonely or isolated from those around you?  Never   Alcohol Use   Q1: How often do you have a drink containing alcohol? Monthly or l   Q2: How many drinks containing alcohol do you have on a typical day when you are drinking? 1 or 2   Q3: How often do you have six or more drinks on one occasion? Never   [employer]   In the past 12 months has the electric, gas, oil, or water company threatened to shut off services in your home? No   Health Literacy   How often do you need to have someone help you when you read instructions, pamphlets, or other written material from your doctor or pharmacy? Never     [facility] ([facility] [address]  [facility]  Initial Discharge Assessment       Primary Care Provider: Deepthi Carver NP    Admission Diagnosis: Endometriosis [N80.9]  Chronic pelvic pain in female [R10.2, G89.29]    Admission Date: 7/15/2025  Expected Discharge Date: 7/16/2025    Transition of Care Barriers: (P) None    Payor: BLUE CROSS BLUE SHIELD / Plan: [facility] [employer] BASIC / Product Type: PPO /     Extended Emergency Contact Information  Primary Emergency Contact: JACKELYN FROST  Address: [facility] [place] [address] [place]   Mobile Phone: [phone]  Relation: Spouse    Discharge Plan A: (P) Home with family  Discharge Plan B: (P) Home Health      [id number] - [place], [place] - [address]  [id number]  [place] [address]  Phone: [phone] Fax: [phone]      Initial Assessment (most recent)       Adult Discharge Assessment - 07/16/25 1007          Discharge Assessment    Assessment Type Discharge Planning Assessment (P)      Confirmed/corrected address, phone number and insurance Yes (P)      Confirmed Demographics Correct on Facesheet (P)      Source of Information patient (P)      Communicated ALEXSANDRA with patient/caregiver Date not available/Unable to determine (P)      People in Home spouse (P)      Name(s) of People in Home JACKELYN FROST (Spouse)  [phone] (P)      Do you have help at home or someone to help you manage your care at home? Yes (P)      Who are your caregiver(s) and their phone number(s)? JACKELYN FROST (Spouse)  [phone] (P)      Prior to hospitilization cognitive status: Alert/Oriented (P)      Current cognitive status: Alert/Oriented (P)      Walking or Climbing Stairs Difficulty no (P)      Dressing/Bathing Difficulty no (P)      Equipment Currently Used at Home CPAP (P)      Readmission within 30 days? No (P)      Patient currently being followed by outpatient case management? No (P)      Do you currently have service(s) that help you manage your care at home? No (P)       Do you take prescription medications? Yes (P)      Do you have prescription coverage? Yes (P)      Do you have any problems affording any of your prescribed medications? No (P)      Is the patient taking medications as prescribed? yes (P)      Who is going to help you get home at discharge? JACKELYN FROST (Spouse)  [phone] (P)      How do you get to doctors appointments? car, drives self (P)      Are you on dialysis? No (P)      Do you take coumadin? No (P)      Discharge Plan A Home with family (P)      Discharge Plan B Home Health (P)      DME Needed Upon Discharge  none (P)      Discharge Plan discussed with: Patient (P)      Transition of Care Barriers None (P)         Physical Activity    On average, how many days per week do you engage in moderate to strenuous exercise (like a brisk walk)? 0 days (P)      On average, how many minutes do you engage in exercise at this level? 0 min (P)         Financial Resource Strain    How hard is it for you to pay for the very basics like food, housing, medical care, and heating? Not hard at all (P)         Housing Stability    In the last 12 months, was there a time when you were not able to pay the mortgage or rent on time? No (P)      At any time in the past 12 months, were you homeless or living in a shelter (including now)? No (P)         Transportation Needs    In the past 12 months, has lack of transportation kept you from medical appointments or from getting medications? No (P)      In the past 12 months, has lack of transportation kept you from meetings, work, or from getting things needed for daily living? No (P)         Food Insecurity    Within the past 12 months, you worried that your food would run out before you got the money to buy more. Never true (P)      Within the past 12 months, the food you bought just didn't last and you didn't have money to get more. Never true (P)         Stress    Do you feel stress - tense, restless, nervous, or anxious, or  unable to sleep at night because your mind is troubled all the time - these days? To some extent (P)         Social Isolation    How often do you feel lonely or isolated from those around you?  Never (P)         Alcohol Use    Q1: How often do you have a drink containing alcohol? Monthly or less (P)      Q2: How many drinks containing alcohol do you have on a typical day when you are drinking? 1 or 2 (P)      Q3: How often do you have six or more drinks on one occasion? Never (P)         Utilities    In the past 12 months has the electric, gas, oil, or water company threatened to shut off services in your home? No (P)         Health Literacy    How often do you need to have someone help you when you read instructions, pamphlets, or other written material from your doctor or pharmacy? Never (P)

## 2025-07-17 ENCOUNTER — PATIENT OUTREACH (OUTPATIENT)
Dept: ADMINISTRATIVE | Facility: CLINIC | Age: 45
End: 2025-07-17
Payer: COMMERCIAL

## 2025-07-17 LAB
ESTROGEN SERPL-MCNC: NORMAL PG/ML
INSULIN SERPL-ACNC: NORMAL U[IU]/ML
LAB AP CLINICAL INFORMATION: NORMAL
LAB AP DIAGNOSIS CATEGORY: NORMAL
LAB AP GROSS DESCRIPTION: NORMAL
LAB AP PERFORMING LOCATION(S): NORMAL
LAB AP REPORT FOOTNOTES: NORMAL

## 2025-07-17 NOTE — PROGRESS NOTES
C3 nurse spoke with Missy Fernandez  for a TCC post hospital discharge follow up call. The patient does not have a scheduled HOSFU appointment with Deepthi Carver NP  within 5-7 days post hospital discharge date 07/16/2025. C3 nurse was unable to schedule HOSFU appointment in [facility].  Please contact patient and schedule follow up appointment using HOSFU visit type on or before 07/25/2025.

## 2025-07-17 NOTE — PROGRESS NOTES
C3 nurse attempted to contact Missy Fernandez for a TCC post hospital discharge follow up call. No answer. LVM requesting a callback at [phone].    The patient does not have a scheduled HOSFU appointment. Message sent to PCP's staff to assist with HOSFU appointment scheduling.

## 2025-07-17 NOTE — DISCHARGE SUMMARY
[facility] - Med Surg ([address])  Discharge Summary  OBGYN    Admission date: 7/15/2025  Discharge date: 7/16/2025    Admit Dx:      Chronic Pelvic Pain  Endometriosis  Obesity  Tobacco use  Hypertension  KERWIN    Discharge Dx:    S/p TLH/LSO 2/2 Chronic Pelvic Pain  Endometriosis  Obesity  Tobacco use  HTN  KERWIN      Attending Physician: Maya Madrid M.D.   Discharge Provider: Maricruz Boyce    Procedures Performed: Procedure(s) (LRB):  HYSTERECTOMY,TOTAL,LAPAROSCOPIC,WITH BILATERAL SALPINGECTOMY AND LEFT OOPHORECTOMY (Bilateral)    Hospital Course: Patient was admitted on 7/15/2025 for TLH with LSO. This procedure was scheduled to be done by Dr. aMdrid, however, Dr. Madrid herself went into labor on the day of the procedure and was thus unavailable. I performed the surgery in her stead. Surgery was uncomplicated, please see op note for full details. After surgery, patient had trouble maintaining O2 sats when sleeping due to KERWIN. Due to this, anesthesia recommended she stay overnight for monitoring.  Hospital course was uncomplicated and patient did well overnight. On the date of discharge, Ms. Fernandez was able to tolerated po, ambulate without difficulty, and her pain was well controlled. At that point she was afebrile, and her labs were stable. She was discharged to home in stable condition.      Consults: none    Significant Diagnostic Studies:   Lab Results   Component Value Date    WBC 8.34 07/01/2025    HGB 15.7 07/01/2025    HCT 46.2 07/01/2025    MCV 90 07/01/2025     07/01/2025     X-Ray Chest PA And Lateral  Narrative: EXAMINATION:  XR CHEST PA AND LATERAL    CLINICAL HISTORY:  Abnormal findings on diagnostic imaging of other specified body structures    TECHNIQUE:  PA and lateral views of the chest were performed.    COMPARISON:  06/25/2025.    FINDINGS:  The cardiac silhouette and superior mediastinal structures are unremarkable. Pulmonary vasculature is within normal limits. The  lungs are well aerated and free of focal consolidations. There is no evidence for pneumothorax or pleural effusions. Bony structures are grossly intact.  Previously noted curvilinear density projecting over the left lower neck is not appreciated on today's examination and was likely artifactual.  Impression: No acute chest disease identified.    Electronically signed by: Duc Wills MD  Date:    07/01/2025  Time:    16:49    Discharged Condition: stable    Disposition: Home    Follow Up: 2 weeks virtual and 8 weeks in person visits with Dr. Boyce for routine post operative care.      Medications:  Discharge Medication List as of 7/16/2025 11:47 AM        START taking these medications    Details   !! ibuprofen (ADVIL,MOTRIN) 600 MG tablet Take 1 tablet (600 mg total) by mouth every 6 (six) hours., Starting Wed 7/16/2025, Normal      ondansetron (ZOFRAN) 4 MG tablet Take 1 tablet (4 mg total) by mouth every 6 (six) hours as needed for Nausea (30 minutes prior to narcotics if needed)., Starting Tue 7/15/2025, Normal      oxyCODONE (ROXICODONE) 5 MG immediate release tablet Take 1 tablet (5 mg total) by mouth every 6 (six) hours as needed for Pain., Starting Wed 7/16/2025, Normal      simethicone (MYLICON) 80 MG chewable tablet Take 1 tablet (80 mg total) by mouth 3 (three) times daily as needed for Flatulence., Starting Wed 7/16/2025, Normal       !! - Potential duplicate medications found. Please discuss with provider.        CONTINUE these medications which have CHANGED    Details   !! citalopram (CELEXA) 40 MG tablet Take 1 tablet (40 mg total) by mouth once daily., Starting Wed 7/16/2025, Until Thu 7/16/2026, Normal      !! losartan (COZAAR) 50 MG tablet Take 1 tablet (50 mg total) by mouth once daily., Starting Wed 7/16/2025, Until Thu 7/16/2026, Normal      oxyCODONE-acetaminophen (PERCOCET) 5-325 mg per tablet Take 1 tablet by mouth every 6 (six) hours as needed for Pain., Starting Tue 7/15/2025,  Normal       !! - Potential duplicate medications found. Please discuss with provider.        CONTINUE these medications which have NOT CHANGED    Details   aspirin/acetaminophen/caffeine (EXCEDRIN EXTRA STRENGTH ORAL) Take by mouth as needed., Historical Med      !! citalopram (CELEXA) 40 MG tablet Take 1 tablet by mouth once daily, Starting Wed 5/21/2025, Normal      !! IBUPROFEN ORAL Take by mouth as needed., Historical Med      LIDOcaine (LIDODERM) 5 % Place 1 patch onto the skin once daily. Remove & Discard patch within 12 hours or as directed by MD, Starting Mon 7/7/2025, Normal      !! losartan (COZAAR) 50 MG tablet Take 1 tablet (50 mg total) by mouth once daily., Starting Thu 1/30/2025, Until Fri 1/30/2026, Normal      semaglutide, weight loss, (WEGOVY) 0.25 mg/0.5 mL PnIj Inject 0.25 mg into the skin every 7 days., Starting Thu 1/30/2025, Normal       !! - Potential duplicate medications found. Please discuss with provider.        STOP taking these medications       medroxyPROGESTERone (DEPO-PROVERA) 150 mg/mL Syrg Comments:   Reason for Stopping:               Discharge Procedure Orders   Diet Adult Regular     Pelvic Rest   Order Comments: Until cleared by physician     Lifting restrictions   Order Comments: Nothing > 10 lbs     Notify your health care provider if you experience any of the following:  temperature >100.4     Notify your health care provider if you experience any of the following:  persistent nausea and vomiting or diarrhea     Notify your health care provider if you experience any of the following:  severe uncontrolled pain     Notify your health care provider if you experience any of the following:  redness, tenderness, or signs of infection (pain, swelling, redness, odor or green/yellow discharge around incision site)     Notify your health care provider if you experience any of the following:  difficulty breathing or increased cough     Notify your health care provider if you  experience any of the following:   Order Comments: Vaginal bleeding heavier than light spotting

## 2025-07-22 NOTE — PROGRESS NOTES
Certification of Assistant at Surgery       Surgery Date: 7/15/2025     Participating Surgeons:  Surgeons and Role:     * Maricruz Boyce MD - Primary     * Negra Luu MD - Assisting    Procedures:  Procedure(s) (LRB):  HYSTERECTOMY,TOTAL,LAPAROSCOPIC,WITH BILATERAL SALPINGECTOMY AND LEFT OOPHORECTOMY (Bilateral)    Assistant Surgeon's Certification of Necessity:  I understand that section 1842 (b) (6) (d) of the Social Security Act generally prohibits Medicare Part B reasonable charge payment for the services of assistants at surgery in teaching hospitals when qualified residents are available to furnish such services. I certify that the services for which payment is claimed were medically necessary, and that no qualified resident was available to perform the services. I further understand that these services are subject to post-payment review by the Medicare carrier.      Negra Luu MD    07/22/2025  4:40 AM

## 2025-08-01 ENCOUNTER — OFFICE VISIT (OUTPATIENT)
Dept: OBSTETRICS AND GYNECOLOGY | Facility: CLINIC | Age: 45
End: 2025-08-01
Attending: STUDENT IN AN ORGANIZED HEALTH CARE EDUCATION/TRAINING PROGRAM
Payer: COMMERCIAL

## 2025-08-01 DIAGNOSIS — Z90.710 S/P HYSTERECTOMY: Primary | ICD-10-CM

## 2025-08-01 NOTE — PROGRESS NOTES
The patient location is: [place]  The chief complaint leading to consultation is: Post Op Care    Visit type: audiovisual    Face to Face time with patient: 5.5 minutes  6 minutes of total time spent on the encounter, which includes face to face time and non-face to face time preparing to see the patient (eg, review of tests), Obtaining and/or reviewing separately obtained history, Documenting clinical information in the electronic or other health record, Independently interpreting results (not separately reported) and communicating results to the patient/family/caregiver, or Care coordination (not separately reported).         Each patient to whom he or she provides medical services by telemedicine is:  (1) informed of the relationship between the physician and patient and the respective role of any other health care provider with respect to management of the patient; and (2) notified that he or she may decline to receive medical services by telemedicine and may withdraw from such care at any time.    Notes:     Subjective:      Missy Fernandez is a 44 y.o.  who presents to the clinic 2 weeks status post total laparoscopic hysterectomy.  The patient is not having any pain. Had pain week one, but once she was able to go to the bathroom normally she has been fine. She describes her vaginal bleeding as absent.     Objective:     There were no vitals taken for this visit.  General:  alert, well appearing, and in no distress      Pathology:  Uterus, cervix, bilateral fallopian tubes and left ovary weighing 136 g showing:   Late secretory endometrium  The cervix shows focal chronic endocervicitis  The left ovary has multiple benign follicular cysts.  The left fallopian tube has a dilated area   The right fallopian tube has a small benign paratubal cyst    Assessment:     44 y.o.  s/p total laparoscopic hysterectomy 2 weeks ago  - doing well    Plan:     1. Continue any current medications.  2. Wound  care discussed.  3. Activity restrictions: no lifting more than 10 pounds and pelvic rest  4. Anticipated return to work: 4 weeks.  5. Follow up:in 6 weeks for cuff check

## 2025-08-26 DIAGNOSIS — N80.9 ENDOMETRIOSIS: ICD-10-CM

## 2025-08-26 RX ORDER — LIDOCAINE 50 MG/G
PATCH TOPICAL
Qty: 30 PATCH | Refills: 0 | Status: SHIPPED | OUTPATIENT
Start: 2025-08-26

## 2025-08-27 ENCOUNTER — OFFICE VISIT (OUTPATIENT)
Dept: OBSTETRICS AND GYNECOLOGY | Facility: CLINIC | Age: 45
End: 2025-08-27
Attending: STUDENT IN AN ORGANIZED HEALTH CARE EDUCATION/TRAINING PROGRAM
Payer: COMMERCIAL

## 2025-08-27 VITALS
DIASTOLIC BLOOD PRESSURE: 78 MMHG | WEIGHT: 236.13 LBS | BODY MASS INDEX: 39.29 KG/M2 | SYSTOLIC BLOOD PRESSURE: 122 MMHG

## 2025-08-27 DIAGNOSIS — Z90.710 S/P HYSTERECTOMY: Primary | ICD-10-CM

## 2025-08-27 PROCEDURE — 4010F ACE/ARB THERAPY RXD/TAKEN: CPT | Mod: CPTII,S$GLB,, | Performed by: OBSTETRICS & GYNECOLOGY

## 2025-08-27 PROCEDURE — 99999 PR PBB SHADOW E&M-EST. PATIENT-LVL III: CPT | Mod: PBBFAC,,, | Performed by: OBSTETRICS & GYNECOLOGY

## 2025-08-27 PROCEDURE — 3044F HG A1C LEVEL LT 7.0%: CPT | Mod: CPTII,S$GLB,, | Performed by: OBSTETRICS & GYNECOLOGY

## 2025-08-27 PROCEDURE — 3078F DIAST BP <80 MM HG: CPT | Mod: CPTII,S$GLB,, | Performed by: OBSTETRICS & GYNECOLOGY

## 2025-08-27 PROCEDURE — 1160F RVW MEDS BY RX/DR IN RCRD: CPT | Mod: CPTII,S$GLB,, | Performed by: OBSTETRICS & GYNECOLOGY

## 2025-08-27 PROCEDURE — 1159F MED LIST DOCD IN RCRD: CPT | Mod: CPTII,S$GLB,, | Performed by: OBSTETRICS & GYNECOLOGY

## 2025-08-27 PROCEDURE — 3074F SYST BP LT 130 MM HG: CPT | Mod: CPTII,S$GLB,, | Performed by: OBSTETRICS & GYNECOLOGY

## 2025-08-27 PROCEDURE — 99024 POSTOP FOLLOW-UP VISIT: CPT | Mod: S$GLB,,, | Performed by: OBSTETRICS & GYNECOLOGY

## (undated) DEVICE — APPLICATOR ENDOSCOPIC

## (undated) DEVICE — GOWN NONREINF SET-IN SLV XL

## (undated) DEVICE — NDL HYPO REG 25G X 1 1/2

## (undated) DEVICE — SET TRI-LUMEN FILTERED TUBE

## (undated) DEVICE — KIT SURGIFLO HEMOSTATIC MATRIX

## (undated) DEVICE — SUT MCRYL PLUS 4-0 PS2 27IN

## (undated) DEVICE — SOL POVIDONE SCRUB IODINE 4 OZ

## (undated) DEVICE — TOWEL OR DISP STRL BLUE 4/PK

## (undated) DEVICE — SEALER LIGASURE LAP 37CM 5MM

## (undated) DEVICE — SUT 0 V-LOC GR GS-21 TAPER

## (undated) DEVICE — TROCAR KII FIOS 5MM X 100MM

## (undated) DEVICE — IRRIGATOR ENDOSCOPY DISP.

## (undated) DEVICE — SPONGE LAP 18X18 PREWASHED

## (undated) DEVICE — GLOVE BIOGEL SKINSENSE PI 6.5

## (undated) DEVICE — TIP RUMI GREEN DISPOSABLE6.7MM

## (undated) DEVICE — ELECTRODE REM PLYHSV RETURN 9

## (undated) DEVICE — SYR 50CC LL

## (undated) DEVICE — SYS SEE SHARP SCP ANTIFG LNG

## (undated) DEVICE — KIT WING PAD POSITIONING

## (undated) DEVICE — PORT ACCESS 5MM W/120MM

## (undated) DEVICE — TROCAR KII FIOS 11MM X 100MM

## (undated) DEVICE — SOL IRR SOD CHL .9% POUR

## (undated) DEVICE — SYR 10CC LUER LOCK

## (undated) DEVICE — SUT VICRYL 0 27 CT-2

## (undated) DEVICE — SOL NORMAL USPCA 0.9%

## (undated) DEVICE — SOL POVIDONE PREP IODINE 4 OZ

## (undated) DEVICE — NDL INSUFFLATION VERRES 120MM

## (undated) DEVICE — PACK LAPAROSCOPY BAPTIST